# Patient Record
Sex: FEMALE | Race: WHITE | Employment: OTHER | URBAN - METROPOLITAN AREA
[De-identification: names, ages, dates, MRNs, and addresses within clinical notes are randomized per-mention and may not be internally consistent; named-entity substitution may affect disease eponyms.]

---

## 2017-01-04 VITALS
TEMPERATURE: 97.6 F | DIASTOLIC BLOOD PRESSURE: 89 MMHG | SYSTOLIC BLOOD PRESSURE: 160 MMHG | RESPIRATION RATE: 20 BRPM | WEIGHT: 167 LBS | OXYGEN SATURATION: 95 % | BODY MASS INDEX: 40.24 KG/M2 | HEART RATE: 86 BPM

## 2017-01-04 NOTE — PROGRESS NOTES
[facility]    Chief Complaint   Patient presents with     group home Regulatory       HPI:   Obtained from the patient, medical record and from the medial staffs.     Jayne Vasquez is a 86 year old  (9/21/1930), who is being seen today for a federally mandated E/M visit at [facility] .     Today's concerns are:  Nontraumatic hemorrhage of cerebral hemisphere, unspecified laterality (H)  - Late effects from fall resulting in hemorrhage of cerebral hemisphere more than a year ago  - Pt is a WC self propel with feet. ADLs dependant. No new issue.      Benign essential hypertension  - No CP, HA or fainting  - noted to have a high blood pressure when has visitors, laundry day.       Type 2 diabetes mellitus with complication, with long-term current use of insulin (H)  - No hypoglycemic episode reported.  -  Resident diet of choice      Vascular Dementia with Paranoia:  - Pt believes  People area stealing her clothes on laundry day, staff take her to laundry room often while clothes are being washed. Believes men are coming into her room.     ALLERGIES: Dye; Fluoxetine; Iodine-131; Methocarbamol; Paroxetine; and Penicillins  PAST MEDICAL HISTORY:  has a past medical history of Hyperlipemia (4.22.11); Chest pain (11/04/10); GERD (gastroesophageal reflux disease) (11/04/10); Anemia, unspecified (11/04/10); Cerebral embolism with cerebral infarction (H) (02/16/10); Seizure disorder, secondary (H) (02/16/10); Aphasia (02/12/10); Vitamin D deficiency (02/08/10); Confusion (01/30/10); Hyponatremia; Osteoarthrosis, shoulder region (07/19/09); Subacromial bursitis; Rotator cuff tear; Labral tear of long head of biceps tendon; Intermediate coronary syndrome (H); GI bleed; Cataracts; Coronary atherosclerosis of unspecified type of vessel, native or graft (05/20/08); History of recurrent UTIs; Unspecified cerebral artery occlusion with cerebral infarction; Diabetes mellitus (H);  Hypertension; Congestive heart failure, unspecified; History of blood transfusion; Diabetic eye exam (H) (3/26/15); Transient cerebral ischemia (3/30/2012); Diabetic infection of left foot (2/26/2013); Diastolic dysfunction, left ventricle, grade I by Echo (4/2/2012); LVH (left ventricular hypertrophy) due to hypertensive disease - mild-moderate (4/2/2012); and Open wound of left foot in 4th interdigital space (2/26/2013). She also has no past medical history of Thyroid disease, Asthma, or Malignant neoplasm (H).  PAST SURGICAL HISTORY:  has past surgical history that includes appendectomy; Cholecystectomy; Hysterectomy; surgical history of - ; colonoscopy; cataract iol, rt/lt; surgical history of - ; surgical history of -  (5/2008); surgical history of - ; surgical history of - ; colonoscopy (5/31/11); surgical history of -  (5/31/11); cardiac catherization (05/20/08); Esophagoscopy, gastroscopy, duodenoscopy (EGD), combined (4/5/2012); Bypass graft artery coronary (4/9/2012); and Phacoemulsification with standard intraocular lens implant (Right, 4/16/2015).  FAMILY HISTORY: family history includes C.A.D. in her father, mother, and son; CANCER in her mother and sister; CEREBROVASCULAR DISEASE in her sister; Neurologic Disorder in her son.  SOCIAL HISTORY:  reports that she has never smoked. She has never used smokeless tobacco. She reports that she does not drink alcohol or use illicit drugs.    MEDICATIONS:  Current Outpatient Prescriptions   Medication Sig Dispense Refill     BusPIRone HCl (BUSPAR PO) Take 5 mg by mouth 2 times daily       insulin aspart (NOVOLOG PEN) 100 UNIT/ML soln Inject 3 Units Subcutaneous daily (with breakfast) (Patient taking differently: Inject 3 Units Subcutaneous daily (with breakfast) Also follow sliding scale)       metFORMIN (GLUCOPHAGE-XR) 500 MG 24 hr tablet Take 500 mg by mouth daily (with breakfast)       trolamine salicylate (ASPERCREME) 10 % cream Apply topically as needed for  moderate pain       hydrocortisone (ANUSOL-HC) 2.5 % rectal cream Place rectally 2 times daily       hypromellose (ARTIFICIAL TEARS) 0.5 % SOLN 1 drop 3 times daily as needed for dry eyes       nystatin (MYCOSTATIN) 578091 UNIT/GM POWD        Cranberry-Vitamin C-Inulin (UTI-STAT PO) Take by mouth daily       QUEtiapine Fumarate (SEROQUEL PO) Take 25 mg by mouth 2 times daily       bisacodyl (DULCOLAX) 10 MG suppository Place 10 mg rectally daily as needed for constipation       Carvedilol (COREG PO) Take 25 mg by mouth 2 times daily (with meals)       Venlafaxine HCl (EFFEXOR XR PO) Take 225 mg by mouth daily (with breakfast)       Acetaminophen (TYLENOL PO) Take 650 mg by mouth 3 times daily as needed for mild pain or fever (also BID scheduled)        ASPIRIN PO Take 81 mg by mouth daily       LISINOPRIL PO Take 40 mg by mouth daily        Gabapentin (NEURONTIN PO) Take 600 mg by mouth 2 times daily       fluticasone (FLONASE) 50 MCG/ACT nasal spray Spray 1 spray into both nostrils At Bedtime       insulin glargine (LANTUS) 100 UNIT/ML PEN Inject 14 Units Subcutaneous every morning        sennosides (SENOKOT) 8.6 MG tablet Take 2 tablets by mouth 2 times daily Also 1 tab BID PRN       polyethylene glycol (MIRALAX/GLYCOLAX) powder Take 17 g by mouth daily        alum & mag hydroxide-simethicone (MAALOX ADVANCED MAX ST) 400-400-40 MG/5ML SUSP Take 30 mLs by mouth every 4 hours as needed PRN for for stomach distress  0     nitroglycerin (NITROSTAT) 0.4 MG SL tablet Place 1 tablet (0.4 mg) under the tongue every 5 minutes as needed 25 tablet      levETIRAcetam (KEPPRA) 100 MG/ML solution Take 7.5 mLs (750 mg) by mouth every 12 hours       traZODone (DESYREL) 50 MG tablet Take 1 tablet (50 mg) by mouth At Bedtime 31 tablet 12     amLODIPine (NORVASC) 10 MG tablet Take 1 tablet (10 mg) by mouth daily 90 tablet 3     omeprazole 20 MG tablet Take 1 tablet (20 mg) by mouth daily 30 tablet      Medications  reviewed:  Medications reconciled to facility chart and changes were made to reflect current medications as identified as above med list. Below are the changes that were made:   Medications stopped since last EPIC medication reconciliation:   There are no discontinued medications.    Medications started since last [facility] medication reconciliation:  No orders of the defined types were placed in this encounter.     Case Management:  I have reviewed the care plan and MDS and do agree with the plan. Patient's desire to return to the community is not present.  Information reviewed:  Medications, vital signs, orders, and nursing notes.    ROS:  10 point ROS of systems including Constitutional, Eyes, Respiratory, Cardiovascular, Gastroenterology, Genitourinary, Integumentary, Muscularskeletal, Psychiatric were all negative except for pertinent positives noted in my HPI.    Exam:  /89 mmHg  Pulse 86  Temp(Src) 97.6  F (36.4  C)  Resp 20  Wt 167 lb (75.751 kg)  SpO2 95%    GENERAL APPEARANCE:  Alert, in no distress, well dressed up.  ENT:  Mouth and posterior oropharynx normal, moist mucous membranes  EYES:  EOM, conjunctivae, lids, pupils and irises normal, Left ptosis. mild  NECK:  No adenopathy,masses or thyromegaly  RESP:  respiratory effort and palpation of chest normal, lungs clear to auscultation   CV:  Palpation and auscultation of heart done , regular rate and rhythm, no murmur, rub, or gallop, no edema  ABDOMEN:  normal bowel sounds, soft, nontender, no hepatosplenomegaly or other masses  M/S:   Ambulate using the wheelchair. Self propel  SKIN:  Inspection of skin and subcutaneous tissue baseline, Palpation of skin and subcutaneous tissue baseline  NEURO:   No neuro focal deficit noted  PSYCH:  oriented to Name only. Memory and judgment impaired.     Lab/Diagnostic data:    Last Basic Metabolic Panel:  NA      141   8/1/2016   POTASSIUM      4.1   8/1/2016  CHLORIDE      104   8/1/2016  ELIZABETH      8.9    8/1/2016  CO2       32   8/1/2016  BUN       26   9/2/2016  CR     1.10   9/2/2016  GLC      145   9/2/2016  GLC      neg   11/23/2012    ASSESSMENT/PLAN  Nontraumatic hemorrhage of cerebral hemisphere, unspecified laterality (H)  - Late effects from fall resulting in hemorrhage of cerebral hemisphere more than a year ago  - Pt is a WC self propel with feet. ADLs dependant.    - Stable     Benign essential hypertension  - BP: 196/79, 164/66, 177/76  BP Readings from Last 3 Encounters:   01/04/17 160/89   12/22/16 158/62   11/21/16 149/65   - On Norvasc 10 mg, Coreg 25 mg bid, lisinopril 40 mg.   - Keep SBP> 130 mmHg and DBP > 65 mmHg (levels below these increase mortality as shown by standard studies and observations).         Type 2 diabetes mellitus with complication, with long-term current use of insulin (H)  - A1C      7.3   1/6/2017  - A1C      7.3   9/2/2016  - On metformin 500 am qam (added Nov 2016)  - On Lantus 18 qam, novolog 3 mg with meals , and SSI  - Dc metformin, Increase novolog am from 3 mg to 5 mg, Check HbA1C, DC supper Accu check.       Vascular dementia with behavior disturbance and Paranoia/ depression and Anxiety:  - BIMS 6/27/16 = 0/15  - Continue to anticipate needs. Chronic condition, ongoing decline expected.   -  Continue to provide redirection and reassurance as needed. Maintain safe living situation with goals focused on comfort.  - On Seroquel 25 mg b.i.d., Effexor 225 mg, Trazodone 50 QHS, Buspar 5 mg bid (added 1/22/16)  - Failed Seroquel GDR attempt.      Frailty:  - Significant  Deficits requiring NH placement  - Requiring extensive assistance from nursing  - Up for meals only o/w spends the day resting in bed    Orders:  -  Dc metformin  - Increase novolog am from 3 mg to 5 mg.   - Check HbA1C   - DC supper Accu check.     Total time spent with patient visit was 35 min including patient visit and review of past records..    Electronically signed by:  Jesse Gonzalez MD

## 2017-01-05 ENCOUNTER — NURSING HOME VISIT (OUTPATIENT)
Dept: GERIATRICS | Facility: CLINIC | Age: 82
End: 2017-01-05
Payer: COMMERCIAL

## 2017-01-05 DIAGNOSIS — R54 FRAIL ELDERLY: ICD-10-CM

## 2017-01-05 DIAGNOSIS — I10 BENIGN ESSENTIAL HYPERTENSION: ICD-10-CM

## 2017-01-05 DIAGNOSIS — F41.9 ANXIETY: ICD-10-CM

## 2017-01-05 DIAGNOSIS — Z79.4 TYPE 2 DIABETES MELLITUS WITH COMPLICATION, WITH LONG-TERM CURRENT USE OF INSULIN (H): ICD-10-CM

## 2017-01-05 DIAGNOSIS — E11.8 TYPE 2 DIABETES MELLITUS WITH COMPLICATION, WITH LONG-TERM CURRENT USE OF INSULIN (H): ICD-10-CM

## 2017-01-05 DIAGNOSIS — I61.2 NONTRAUMATIC HEMORRHAGE OF CEREBRAL HEMISPHERE, UNSPECIFIED LATERALITY (H): Primary | ICD-10-CM

## 2017-01-05 DIAGNOSIS — F01.518 VASCULAR DEMENTIA WITH BEHAVIOR DISTURBANCE (H): ICD-10-CM

## 2017-01-05 PROCEDURE — 99310 SBSQ NF CARE HIGH MDM 45: CPT | Performed by: FAMILY MEDICINE

## 2017-01-05 PROCEDURE — 99207 ZZC CDG-CORRECTLY CODED, REVIEWED AND AGREE: CPT | Performed by: FAMILY MEDICINE

## 2017-01-06 ENCOUNTER — HOSPITAL LABORATORY (OUTPATIENT)
Dept: NURSING HOME | Facility: OTHER | Age: 82
End: 2017-01-06

## 2017-01-06 LAB — HBA1C MFR BLD: 7.3 % (ref 4.3–6)

## 2017-01-09 PROBLEM — R54 FRAIL ELDERLY: Status: ACTIVE | Noted: 2017-01-09

## 2017-01-25 ENCOUNTER — HOSPITAL LABORATORY (OUTPATIENT)
Dept: NURSING HOME | Facility: OTHER | Age: 82
End: 2017-01-25

## 2017-01-25 LAB — HBA1C MFR BLD: NORMAL % (ref 4.3–6)

## 2017-03-02 ENCOUNTER — NURSING HOME VISIT (OUTPATIENT)
Dept: GERIATRICS | Facility: CLINIC | Age: 82
End: 2017-03-02
Payer: COMMERCIAL

## 2017-03-02 VITALS
HEART RATE: 72 BPM | TEMPERATURE: 97.2 F | WEIGHT: 182 LBS | SYSTOLIC BLOOD PRESSURE: 139 MMHG | BODY MASS INDEX: 43.88 KG/M2 | DIASTOLIC BLOOD PRESSURE: 72 MMHG | RESPIRATION RATE: 18 BRPM | OXYGEN SATURATION: 98 %

## 2017-03-02 DIAGNOSIS — G99.0 PERIPHERAL AUTONOMIC NEUROPATHY IN DISORDERS CLASSIFIED ELSEWHERE: ICD-10-CM

## 2017-03-02 DIAGNOSIS — F41.9 ANXIETY: ICD-10-CM

## 2017-03-02 DIAGNOSIS — E11.8 TYPE 2 DIABETES MELLITUS WITH COMPLICATION, WITH LONG-TERM CURRENT USE OF INSULIN (H): Primary | ICD-10-CM

## 2017-03-02 DIAGNOSIS — F22 PARANOIA (H): ICD-10-CM

## 2017-03-02 DIAGNOSIS — I51.9 DIASTOLIC DYSFUNCTION, LEFT VENTRICLE: ICD-10-CM

## 2017-03-02 DIAGNOSIS — I25.10 CORONARY ARTERY DISEASE INVOLVING NATIVE CORONARY ARTERY OF NATIVE HEART WITHOUT ANGINA PECTORIS: ICD-10-CM

## 2017-03-02 DIAGNOSIS — I13.0 HYPERTENSIVE HEART AND KIDNEY DISEASE WITH HF AND WITH CKD STAGE I-IV (H): ICD-10-CM

## 2017-03-02 DIAGNOSIS — K21.9 GASTROESOPHAGEAL REFLUX DISEASE, ESOPHAGITIS PRESENCE NOT SPECIFIED: ICD-10-CM

## 2017-03-02 DIAGNOSIS — I35.0 AORTIC STENOSIS, MILD: ICD-10-CM

## 2017-03-02 DIAGNOSIS — N18.30 CKD (CHRONIC KIDNEY DISEASE) STAGE 3, GFR 30-59 ML/MIN (H): ICD-10-CM

## 2017-03-02 DIAGNOSIS — I34.0 MITRAL VALVE INSUFFICIENCY, UNSPECIFIED ETIOLOGY: ICD-10-CM

## 2017-03-02 DIAGNOSIS — R44.1 VISUAL HALLUCINATIONS: ICD-10-CM

## 2017-03-02 DIAGNOSIS — F01.518 VASCULAR DEMENTIA WITH BEHAVIOR DISTURBANCE (H): ICD-10-CM

## 2017-03-02 DIAGNOSIS — F33.1 MAJOR DEPRESSIVE DISORDER, RECURRENT EPISODE, MODERATE (H): ICD-10-CM

## 2017-03-02 DIAGNOSIS — Z79.4 TYPE 2 DIABETES MELLITUS WITH COMPLICATION, WITH LONG-TERM CURRENT USE OF INSULIN (H): Primary | ICD-10-CM

## 2017-03-02 PROCEDURE — 99309 SBSQ NF CARE MODERATE MDM 30: CPT | Performed by: NURSE PRACTITIONER

## 2017-03-02 NOTE — PROGRESS NOTES
[facility]    Chief Complaint   Patient presents with     retirement Regulatory       HPI:    Jayne Vasquez is a 86 year old  (9/21/1930), who is being seen today for a federally mandated E/M visit at [facility] . Today's concerns are:  Type 2 diabetes mellitus with complication, with long-term current use of insulin (H)  On Lantus 18 units qAM, Novolog 5 units with breakfast, SSI BID, TID accuchecks  SSI starts at .  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)/Diastolic dysfunction, left ventricle, grade I by Echo/Coronary artery disease involving native coronary artery of native heart without angina pectoris/Aortic stenosis, mild - per Echo/Mitral valve insufficiency, unspecified etiology  On lisinopril 40 mg daily, amlodipine 10 mg daily, ASA 81 mg daily, carvedilol 25 mg BID, nitro PRN (no usage)  BPs 140s/50-70s mostly  HR 70-90s    CKD stage 3, due to effects of DM II and HTN GFR 9/2012 51; 4/2012 59; 12/11 53; 10/2011 59  Cardio-renal  Last Basic Metabolic Panel:  Lab Results   Component Value Date     08/01/2016      Lab Results   Component Value Date    POTASSIUM 4.1 08/01/2016     Lab Results   Component Value Date    CHLORIDE 104 08/01/2016     Lab Results   Component Value Date    ELIZABETH 8.9 08/01/2016     Lab Results   Component Value Date    CO2 32 08/01/2016     Lab Results   Component Value Date    BUN 26 09/02/2016     Lab Results   Component Value Date    CR 1.10 09/02/2016     Lab Results   Component Value Date     09/02/2016    GLC neg 11/23/2012       Vascular dementia with behavior disturbance/Major depressive disorder, recurrent episode, moderate (H)/Anxiety/Paranoia (H)/Visual hallucinations  Patient has paranoia, especially on the days when her laundry is being done as she believes people are stealing it.    Nursing also noting that patient reports men are coming into her room.   On Seroquel 25 mg BID, Effexor  mg daily, Trazodone 50 mg HS, Buspar 5 mg BID.  Also on Lamictal 750 mg BID (for history of seizures).    Advanced, progressive decline.  Patient unable to make needs known; nursing must anticipate needs.     Gastroesophageal reflux disease, esophagitis presence not specified  On omeprazole 20 mg daily.  Patient not reliable historian on symptoms.     Peripheral autonomic neuropathy in disorders classified elsewhere  On gabapentin 600 mg BID      ALLERGIES: Dye [contrast dye]; Fluoxetine; Iodine-131; Methocarbamol; Paroxetine; and Penicillins  PAST MEDICAL HISTORY:  has a past medical history of Anemia, unspecified (11/04/10); Aphasia (02/12/10); Cataracts; Cerebral embolism with cerebral infarction (H) (02/16/10); Chest pain (11/04/10); Confusion (01/30/10); Congestive heart failure, unspecified; Coronary atherosclerosis of unspecified type of vessel, native or graft (05/20/08); Diabetes mellitus (H); Diabetic eye exam (H) (3/26/15); Diabetic infection of left foot (2/26/2013); Diastolic dysfunction, left ventricle, grade I by Echo (4/2/2012); GERD (gastroesophageal reflux disease) (11/04/10); GI bleed; History of blood transfusion; History of recurrent UTIs; Hyperlipemia (4.22.11); Hypertension; Hyponatremia; Intermediate coronary syndrome (H); Labral tear of long head of biceps tendon; LVH (left ventricular hypertrophy) due to hypertensive disease - mild-moderate (4/2/2012); Open wound of left foot in 4th interdigital space (2/26/2013); Osteoarthrosis, shoulder region (07/19/09); Rotator cuff tear; Seizure disorder, secondary (H) (02/16/10); Subacromial bursitis; Transient cerebral ischemia (3/30/2012); Unspecified cerebral artery occlusion with cerebral infarction; and Vitamin D deficiency (02/08/10). She also has no past medical history of Asthma; Malignant neoplasm (H); or Thyroid disease.  PAST  SURGICAL HISTORY:  has a past surgical history that includes appendectomy; Cholecystectomy; Hysterectomy; surgical history of -; colonoscopy; cataract iol, rt/lt; surgical history of -; surgical history of - (5/2008); surgical history of -; surgical history of -; colonoscopy (5/31/11); surgical history of - (5/31/11); cardiac catherization (05/20/08); Esophagoscopy, gastroscopy, duodenoscopy (EGD), combined (4/5/2012); Bypass graft artery coronary (4/9/2012); and Phacoemulsification with standard intraocular lens implant (Right, 4/16/2015).  FAMILY HISTORY: family history includes C.A.D. in her father, mother, and son; CANCER in her mother and sister; CEREBROVASCULAR DISEASE in her sister; Neurologic Disorder in her son.  SOCIAL HISTORY:  reports that she has never smoked. She has never used smokeless tobacco. She reports that she does not drink alcohol or use illicit drugs.    MEDICATIONS:  Current Outpatient Prescriptions   Medication Sig Dispense Refill     DOCUSATE SODIUM PO Take 100 mg by mouth 2 times daily       BusPIRone HCl (BUSPAR PO) Take 5 mg by mouth 2 times daily       insulin aspart (NOVOLOG PEN) 100 UNIT/ML soln Inject 3 Units Subcutaneous daily (with breakfast) (Patient taking differently: Inject 8 Units Subcutaneous daily (with breakfast) )       trolamine salicylate (ASPERCREME) 10 % cream Apply topically as needed for moderate pain       hydrocortisone (ANUSOL-HC) 2.5 % rectal cream Place rectally 2 times daily       hypromellose (ARTIFICIAL TEARS) 0.5 % SOLN 1 drop 3 times daily as needed for dry eyes       nystatin (MYCOSTATIN) 334673 UNIT/GM POWD        Cranberry-Vitamin C-Inulin (UTI-STAT PO) Take by mouth daily       QUEtiapine Fumarate (SEROQUEL PO) Take 25 mg by mouth 2 times daily       bisacodyl (DULCOLAX) 10 MG suppository Place 10 mg rectally daily as needed for constipation       Carvedilol (COREG PO) Take 25 mg by mouth 2 times daily (with meals)       Venlafaxine HCl (EFFEXOR XR PO)  Take 225 mg by mouth daily (with breakfast)       Acetaminophen (TYLENOL PO) Take 650 mg by mouth 2 times daily Also TID PRN       ASPIRIN PO Take 81 mg by mouth daily       LISINOPRIL PO Take 40 mg by mouth daily        Gabapentin (NEURONTIN PO) Take 600 mg by mouth 2 times daily       fluticasone (FLONASE) 50 MCG/ACT nasal spray Spray 1 spray into both nostrils At Bedtime       insulin glargine (LANTUS) 100 UNIT/ML PEN Inject 18 Units Subcutaneous every morning        sennosides (SENOKOT) 8.6 MG tablet Take 2 tablets by mouth 2 times daily        polyethylene glycol (MIRALAX/GLYCOLAX) powder Take 17 g by mouth daily        alum & mag hydroxide-simethicone (MAALOX ADVANCED MAX ST) 400-400-40 MG/5ML SUSP Take 30 mLs by mouth every 4 hours as needed PRN for for stomach distress  0     nitroglycerin (NITROSTAT) 0.4 MG SL tablet Place 1 tablet (0.4 mg) under the tongue every 5 minutes as needed 25 tablet      levETIRAcetam (KEPPRA) 100 MG/ML solution Take 7.5 mLs (750 mg) by mouth every 12 hours       traZODone (DESYREL) 50 MG tablet Take 1 tablet (50 mg) by mouth At Bedtime 31 tablet 12     amLODIPine (NORVASC) 10 MG tablet Take 1 tablet (10 mg) by mouth daily 90 tablet 3     Medications reviewed:  Medications reconciled to facility chart and changes were made to reflect current medications as identified as above med list. Below are the changes that were made:   Medications stopped since last EPIC medication reconciliation:   Medications Discontinued During This Encounter   Medication Reason     omeprazole 20 MG tablet Medication Reconciliation Clean Up     metFORMIN (GLUCOPHAGE-XR) 500 MG 24 hr tablet Medication Reconciliation Clean Up       Medications started since last [facility] medication reconciliation:  Orders Placed This Encounter   Medications     DOCUSATE SODIUM PO     Sig: Take 100 mg by mouth 2 times daily     Case Management:  I have reviewed the care plan and MDS and do agree with the plan. Patient's desire  to return to the community is not assessible due to cognitive impairment.  Information reviewed:  Medications, vital signs, orders, and nursing notes.    ROS:  Unobtainable secondary to cognitive impairment or aphasia.    Exam:  /72  Pulse 72  Temp 97.2  F (36.2  C)  Resp 18  Wt 182 lb (82.6 kg)  SpO2 98%  BMI 43.88 kg/m2  GENERAL APPEARANCE:  Alert, in no overt distress, easily agitated  RESP:  respiratory effort and palpation of chest normal, auscultation of lungs clear, no respiratory distress  CV:  Palpation and auscultation of heart done , rate and rhythm irregular, systolic murmur, no LE peripheral edema  ABDOMEN: obese, normal bowel sounds, soft, nontender, no hepatosplenomegaly or other masses  M/S:   Gait and station with WC mobility, Digits and nails at baseline,   SKIN:  Inspection and Palpation of skin and subcutaneous tissue intact, no rashes present, dry, fragile, pale  PSYCH:  insight and judgement, memory with severe impairment, affect and mood normal, does not follow commands readily       Lab/Diagnostic data:    Results for orders placed or performed in visit on 01/25/17   Hemoglobin A1c   Result Value Ref Range    Hemoglobin A1C  4.3 - 6.0 %     Canceled, Test credited   Duplicate request  CORRECTED ON 01/25 AT 0913: PREVIOUSLY REPORTED AS 7.4       Last Basic Metabolic Panel:  Lab Results   Component Value Date     08/01/2016      Lab Results   Component Value Date    POTASSIUM 4.1 08/01/2016     Lab Results   Component Value Date    CHLORIDE 104 08/01/2016     Lab Results   Component Value Date    ELIZABETH 8.9 08/01/2016     Lab Results   Component Value Date    CO2 32 08/01/2016     Lab Results   Component Value Date    BUN 26 09/02/2016     Lab Results   Component Value Date    CR 1.10 09/02/2016     Lab Results   Component Value Date     09/02/2016    GLC neg 11/23/2012         ASSESSMENT/PLAN  Type 2 diabetes mellitus with complication, with long-term current use of insulin  (H)  Will increase breakfast Novolog to 8 units and DC SSI and accuchecks.  Discussed POC with patient's son/POA and he agreed.  Will check A1C every 13 weeks to monitor for progress. Last A1C 7.3 - goal around 8%.     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  Patient also has history of a fib with anticoagulation of ASA 81 mg only d/t CVA bleed.   BPs stable with goal >130 and <150.  LS clear, no LE edema  Weights up but likely nutritional weight gain as no clinical signs of exacerbation - no diuretics in use.     CKD stage 3, due to effects of DM II and HTN GFR 9/2012 51; 4/2012 59; 12/11 53; 10/2011 59  Cardio-renal.   Will redraw BMP for monitoring.   Avoid nephrotoxic agents and mindful prescribing with renal dosing.       Vascular dementia with behavior disturbance/Major depressive disorder, recurrent episode, moderate (H)/Anxiety/Paranoia (H)/Visual hallucinations  Will monitor.  Buspar started 12/22/16.    Has failed GDR of seroquel  Patient continues to have above-mentioned behaviors but are less frequent and nursing does effective job of reassuring patient and redirecting patient when able.     Gastroesophageal reflux disease, esophagitis presence not specified  Discussion with patient's son/POA about taper of PPI to assess for need.  Patient's son agrees to POC.   PPIs increase risk of pneumonia, C.Diff., fractures, hypomagnesemia.  Risk outweighs benefit of continued therapy.      Peripheral autonomic neuropathy in disorders classified elsewhere  Continue on gabapentin.       Orders:  1. Increase Breakfast Novolog to 8 units sq.  Dx: DM2  2.  D/c SSI Novolog.  3.  D/c Accu checks.  4.  D/c Phillips.  No use.  5.   BMP Monday 3/6/17.  Dx: CKD 3  6.  Decrease Omeprazole to 10 mg po QD x 7 days, then 10 mg po QOD x 10 days, then d/c.  Dx: GERD    Total time spent with patient visit was 35 min including patient visit, phone call to POA, and review of past records.Greater than 50% of total time spent with counseling and coordinating care.    Electronically signed by:  LORI Ricks CNP

## 2017-03-03 ENCOUNTER — HOSPITAL LABORATORY (OUTPATIENT)
Dept: NURSING HOME | Facility: OTHER | Age: 82
End: 2017-03-03

## 2017-03-03 LAB
BUN SERPL-MCNC: 21 MG/DL (ref 7–30)
CREAT SERPL-MCNC: 1.08 MG/DL (ref 0.52–1.04)
ERYTHROCYTE [DISTWIDTH] IN BLOOD BY AUTOMATED COUNT: 12.4 % (ref 10–15)
GFR SERPL CREATININE-BSD FRML MDRD: 48 ML/MIN/1.7M2
HCT VFR BLD AUTO: 36.5 % (ref 35–47)
HGB BLD-MCNC: 12.2 G/DL (ref 11.7–15.7)
MCH RBC QN AUTO: 30.2 PG (ref 26.5–33)
MCHC RBC AUTO-ENTMCNC: 33.4 G/DL (ref 31.5–36.5)
MCV RBC AUTO: 90 FL (ref 78–100)
PLATELET # BLD AUTO: 147 10E9/L (ref 150–450)
RBC # BLD AUTO: 4.04 10E12/L (ref 3.8–5.2)
WBC # BLD AUTO: 4.8 10E9/L (ref 4–11)

## 2017-03-06 ENCOUNTER — HOSPITAL LABORATORY (OUTPATIENT)
Dept: NURSING HOME | Facility: OTHER | Age: 82
End: 2017-03-06

## 2017-03-06 LAB
ANION GAP SERPL CALCULATED.3IONS-SCNC: 10 MMOL/L (ref 3–14)
BUN SERPL-MCNC: 24 MG/DL (ref 7–30)
CALCIUM SERPL-MCNC: 8.7 MG/DL (ref 8.5–10.1)
CHLORIDE SERPL-SCNC: 104 MMOL/L (ref 94–109)
CO2 SERPL-SCNC: 30 MMOL/L (ref 20–32)
CREAT SERPL-MCNC: 1.12 MG/DL (ref 0.52–1.04)
GFR SERPL CREATININE-BSD FRML MDRD: 46 ML/MIN/1.7M2
GLUCOSE SERPL-MCNC: 152 MG/DL (ref 70–99)
POTASSIUM SERPL-SCNC: 3.9 MMOL/L (ref 3.4–5.3)
SODIUM SERPL-SCNC: 144 MMOL/L (ref 133–144)

## 2017-04-14 ENCOUNTER — NURSING HOME VISIT (OUTPATIENT)
Dept: GERIATRICS | Facility: CLINIC | Age: 82
End: 2017-04-14
Payer: COMMERCIAL

## 2017-04-14 VITALS
WEIGHT: 181.8 LBS | OXYGEN SATURATION: 90 % | SYSTOLIC BLOOD PRESSURE: 160 MMHG | DIASTOLIC BLOOD PRESSURE: 82 MMHG | RESPIRATION RATE: 18 BRPM | BODY MASS INDEX: 43.83 KG/M2 | TEMPERATURE: 98.1 F | HEART RATE: 93 BPM

## 2017-04-14 DIAGNOSIS — J44.9 CHRONIC OBSTRUCTIVE PULMONARY DISEASE, UNSPECIFIED COPD TYPE (H): ICD-10-CM

## 2017-04-14 DIAGNOSIS — Z79.4 TYPE 2 DIABETES MELLITUS WITH COMPLICATION, WITH LONG-TERM CURRENT USE OF INSULIN (H): ICD-10-CM

## 2017-04-14 DIAGNOSIS — F41.9 ANXIETY: ICD-10-CM

## 2017-04-14 DIAGNOSIS — N18.30 CKD (CHRONIC KIDNEY DISEASE) STAGE 3, GFR 30-59 ML/MIN (H): ICD-10-CM

## 2017-04-14 DIAGNOSIS — I10 HTN, GOAL BELOW 140/90: ICD-10-CM

## 2017-04-14 DIAGNOSIS — R56.9 CONVULSIONS, UNSPECIFIED CONVULSION TYPE (H): ICD-10-CM

## 2017-04-14 DIAGNOSIS — K21.9 GASTROESOPHAGEAL REFLUX DISEASE, ESOPHAGITIS PRESENCE NOT SPECIFIED: ICD-10-CM

## 2017-04-14 DIAGNOSIS — E11.8 TYPE 2 DIABETES MELLITUS WITH COMPLICATION, WITH LONG-TERM CURRENT USE OF INSULIN (H): ICD-10-CM

## 2017-04-14 DIAGNOSIS — I35.0 AORTIC STENOSIS, MILD: ICD-10-CM

## 2017-04-14 DIAGNOSIS — I25.10 CORONARY ARTERY DISEASE INVOLVING NATIVE CORONARY ARTERY OF NATIVE HEART WITHOUT ANGINA PECTORIS: ICD-10-CM

## 2017-04-14 DIAGNOSIS — G89.29 CHRONIC MIDLINE LOW BACK PAIN WITH SCIATICA, SCIATICA LATERALITY UNSPECIFIED: ICD-10-CM

## 2017-04-14 DIAGNOSIS — I61.2 NONTRAUMATIC HEMORRHAGE OF CEREBRAL HEMISPHERE, UNSPECIFIED LATERALITY (H): Primary | ICD-10-CM

## 2017-04-14 DIAGNOSIS — M54.2 NECK PAIN ON RIGHT SIDE: ICD-10-CM

## 2017-04-14 DIAGNOSIS — F01.518 VASCULAR DEMENTIA WITH BEHAVIOR DISTURBANCE (H): ICD-10-CM

## 2017-04-14 DIAGNOSIS — F32.A DEPRESSION, UNSPECIFIED DEPRESSION TYPE: ICD-10-CM

## 2017-04-14 DIAGNOSIS — M54.40 CHRONIC MIDLINE LOW BACK PAIN WITH SCIATICA, SCIATICA LATERALITY UNSPECIFIED: ICD-10-CM

## 2017-04-14 DIAGNOSIS — F22 PARANOIA (H): ICD-10-CM

## 2017-04-14 DIAGNOSIS — I34.0 MITRAL VALVE INSUFFICIENCY, UNSPECIFIED ETIOLOGY: ICD-10-CM

## 2017-04-14 DIAGNOSIS — I25.2 OLD MYOCARDIAL INFARCTION: ICD-10-CM

## 2017-04-14 DIAGNOSIS — I51.9 DIASTOLIC DYSFUNCTION, LEFT VENTRICLE: ICD-10-CM

## 2017-04-14 DIAGNOSIS — I11.0 LVH (LEFT VENTRICULAR HYPERTROPHY) DUE TO HYPERTENSIVE DISEASE, WITH HEART FAILURE (H): ICD-10-CM

## 2017-04-14 PROCEDURE — 99318 ZZC ANNUAL NURSING FAC ASSESSMNT, STABLE: CPT | Performed by: NURSE PRACTITIONER

## 2017-04-14 RX ORDER — HALOPERIDOL 5 MG/ML
2 INJECTION INTRAMUSCULAR EVERY 6 HOURS PRN
COMMUNITY
End: 2017-05-22

## 2017-04-14 NOTE — PROGRESS NOTES
"[place]  Chief Complaint   Patient presents with     [facility] Regulatory       HPI:    Jayne Vasquez is a 86 year old  (9/21/1930), who is being seen today for an annual comprehensive visit at [facility] . Today's concerns are:  Hx of Nontraumatic hemorrhage of cerebral hemisphere, unspecified laterality (H)/Convulsions, unspecified convulsion type (H)  2010 after a fall at home  Post CVA seizure - put on levetriacetam 750 mg BID - no seizures since  On ASA 81 mg daily for History of TIAs    Paranoia (H)/Vascular dementia with behavior disturbance/Depression, unspecified depression type/Anxiety  Recent psychosis where family very upset at patient's paranoia, hallucinations and severe anxiety.  Buspirone changed at that time from BID to TID (now 5 mg TID).  Patient on Seroquel 25 mg BID (failed GDR in past).  Seroquel 12.5 mg daily PRN (used once) added at that time as well.    Also on Tazodone 50 mg qHS, Effexor  mg daily  Has Haldol 1 mg IM daily PRN for severe psychosis (no usage)    3/23/17 BIMS noting \"moderately impaired\" - prev BIMS noting severe impairment      Type 2 diabetes mellitus with complication, with long-term current use of insulin (H)  Metformin DC'd 2/2017  On Lantus 18 units qAM, Novolog 8 units with breakfast  No accuchecks  Lab Results   Component Value Date    A1C  01/25/2017     Canceled, Test credited   Duplicate request  CORRECTED ON 01/25 AT 0913: PREVIOUSLY REPORTED AS 7.4      A1C 7.3 01/06/2017    A1C  10/26/2016     Canceled, Test credited  REFUSED TO SIGN ABN  CORRECTED ON 10/26 AT 1150: PREVIOUSLY REPORTED AS 7.4      A1C 7.3 09/02/2016    A1C 7.1 07/27/2016     Recheck A1C scheduled for 4/26/17    Also on gabapentin 600 mg BID  On ASA 81 mg, no statin    HTN, goal below 140/90/Old myocardial infarction/LVH (left ventricular hypertrophy) due to hypertensive disease, with heart failure (H)/Diastolic dysfunction, left " ventricle, grade I by Echo/Coronary artery disease involving native coronary artery of native heart without angina pectoris/Mitral valve insufficiency, unspecified etiology/Aortic stenosis, mild - per Echo  Significant cardiac history   BPs 130-160s/70-90s  HRs 80-90  On nitro PRN (no usage), norvasc 10 mg daily, ASA 81 mg daily, Coreg 25 mg BID, lisinopril 40 mg daily  Patient unable to note today if she has CP    Chronic obstructive pulmonary disease, unspecified COPD type (H)  On no meds  Patient unable to describe if SOB - nursing notes she never c/o SOB    CKD stage 3, due to effects of DM II and HTN GFR 9/2012 51; 4/2012 59; 12/11 53; 10/2011 59  3/6/17 labs: BUN 24, Creat 1.12, GFR 46  Cardio-renal    Gastroesophageal reflux disease, esophagitis presence not specified  On Alum-Mag-Simethicone q4 hours PRN (no usage)   History GI bleed  Patient unable to describe if symptoms of heartburn or upset stomach    Chronic midline low back pain with sciatica, sciatica laterality unspecified/Neck pain on right side  On Tylenol 650 mg BID  Patient unable to describe today if having pain.     ALLERGIES: Dye [contrast dye]; Fluoxetine; Iodine-131; Methocarbamol; Paroxetine; and Penicillins  PROBLEM LIST:  Patient Active Problem List   Diagnosis     Pulmonary HTN (H)     Hyperlipidemia LDL goal <100     GERD (gastroesophageal reflux disease)     COPD (chronic obstructive pulmonary disease) (H)     Old myocardial infarction     CKD stage 3, due to effects of DM II and HTN GFR 9/2012 51; 4/2012 59; 12/11 53; 10/2011 59     Other and unspecified angina pectoris due to effects of htn heart disease     Transient ischemic attack (TIA), and cerebral infarction without residual deficits     Convulsions (H)     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)     Peripheral autonomic neuropathy in disorders classified elsewhere     Blindness, legal     Anxiety     Thrombocytopenia (H)     Anemia of other chronic  disease due to CKD     Constipation     Headache     Diarrhea     CAD s/p CABG 2012; angio 2008 - occluded RCA and RCA stent, distal circ occlusion with open prox-mid circ stent, 30% D2 stenosis     Visual hallucinations     MR (mitral regurgitation) - mild on Echo     Aortic stenosis, mild - per Echo     LVH (left ventricular hypertrophy) due to hypertensive disease - mild-moderate     Diastolic dysfunction, left ventricle, grade I by Echo     Atrial fibrillation (H)     Postsurgical aortocoronary bypass status     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)     Overactive bladder     Neuropathy of lower extremity     Yeast infection of the skin     Moderate major depression (H)     Cystitis, chronic     Cellulitis     Atherosclerotic peripheral vascular disease with intermittent claudication (H)     Type 2 diabetes mellitus with manifestations - retinopathy, neuropathy, nephropathy     Seizure disorder (H)     Diabetic foot infection (H)     Leukopenia     Advanced directives, counseling/discussion     Type 2 diabetes, HbA1C goal < 8% (H)     Orthostatic hypotension     Nonhealing nonsurgical wound     Bone infection (H)     Vascular dementia     Fracture of phalanx of finger     Fall from bed     Neck pain on right side     Intracerebral hemorrhage (H)     HTN, goal below 140/90     Urinary frequency     Cerebral infarction (H)     Health Care Home     Anemia     Back pain     Confusion     Paranoia (H)     Depression     Dementia     Psychosis     Nontraumatic hemorrhage of cerebral hemisphere (H)     Frail elderly     PAST MEDICAL HISTORY:  has a past medical history of Anemia, unspecified (11/04/10); Aphasia (02/12/10); Cataracts; Cerebral embolism with cerebral infarction (H) (02/16/10); Chest pain (11/04/10); Confusion (01/30/10); Congestive heart failure, unspecified; Coronary atherosclerosis of unspecified type of vessel, native or graft (05/20/08); Diabetes mellitus (H);  Diabetic eye exam (H) (3/26/15); Diabetic infection of left foot (2/26/2013); Diastolic dysfunction, left ventricle, grade I by Echo (4/2/2012); GERD (gastroesophageal reflux disease) (11/04/10); GI bleed; History of blood transfusion; History of recurrent UTIs; Hyperlipemia (4.22.11); Hypertension; Hyponatremia; Intermediate coronary syndrome (H); Labral tear of long head of biceps tendon; LVH (left ventricular hypertrophy) due to hypertensive disease - mild-moderate (4/2/2012); Open wound of left foot in 4th interdigital space (2/26/2013); Osteoarthrosis, shoulder region (07/19/09); Rotator cuff tear; Seizure disorder, secondary (H) (02/16/10); Subacromial bursitis; Transient cerebral ischemia (3/30/2012); Unspecified cerebral artery occlusion with cerebral infarction; and Vitamin D deficiency (02/08/10). She also has no past medical history of Asthma; Malignant neoplasm (H); or Thyroid disease.  PAST SURGICAL HISTORY:  has a past surgical history that includes appendectomy; Cholecystectomy; Hysterectomy; surgical history of -; colonoscopy; cataract iol, rt/lt; surgical history of -; surgical history of - (5/2008); surgical history of -; surgical history of -; colonoscopy (5/31/11); surgical history of - (5/31/11); cardiac catherization (05/20/08); Esophagoscopy, gastroscopy, duodenoscopy (EGD), combined (4/5/2012); Bypass graft artery coronary (4/9/2012); and Phacoemulsification with standard intraocular lens implant (Right, 4/16/2015).  FAMILY HISTORY: family history includes C.A.D. in her father, mother, and son; CANCER in her mother and sister; CEREBROVASCULAR DISEASE in her sister; Neurologic Disorder in her son.  SOCIAL HISTORY:  reports that she has never smoked. She has never used smokeless tobacco. She reports that she does not drink alcohol or use illicit drugs.  IMMUNIZATIONS:  Most Recent Immunizations   Administered Date(s) Administered     Influenza (High Dose) 3 valent vaccine 11/20/2015      Influenza (IIV3) 10/01/2014     Pneumococcal 23 valent 06/24/2015     Tdap (Adacel,Boostrix) 11/20/2015     Above immunizations pulled from [facility]. [facility] and facility records also reconciled. Outstanding information sent to  to update [facility].  Future immunizations are not needed at this point as all recommended immunizations are up to date.   MEDICATIONS:  Current Outpatient Prescriptions   Medication Sig Dispense Refill     haloperidol lactate (HALDOL) 5 MG/ML injection Inject 2 mg into the vein every 6 hours as needed for agitation       DOCUSATE SODIUM PO Take 100 mg by mouth 2 times daily       BusPIRone HCl (BUSPAR PO) Take 5 mg by mouth 3 times daily        insulin aspart (NOVOLOG PEN) 100 UNIT/ML soln Inject 3 Units Subcutaneous daily (with breakfast) (Patient taking differently: Inject 8 Units Subcutaneous daily (with breakfast) )       trolamine salicylate (ASPERCREME) 10 % cream Apply topically as needed for moderate pain       hydrocortisone (ANUSOL-HC) 2.5 % rectal cream Place rectally 2 times daily       hypromellose (ARTIFICIAL TEARS) 0.5 % SOLN 1 drop 3 times daily as needed for dry eyes       nystatin (MYCOSTATIN) 682059 UNIT/GM POWD        Cranberry-Vitamin C-Inulin (UTI-STAT PO) Take by mouth daily       QUEtiapine Fumarate (SEROQUEL PO) Take 25 mg by mouth 2 times daily Also 12.5 QD PRN       bisacodyl (DULCOLAX) 10 MG suppository Place 10 mg rectally daily as needed for constipation       Carvedilol (COREG PO) Take 25 mg by mouth 2 times daily (with meals)       Venlafaxine HCl (EFFEXOR XR PO) Take 225 mg by mouth daily (with breakfast)       Acetaminophen (TYLENOL PO) Take 650 mg by mouth 2 times daily Also TID PRN       ASPIRIN PO Take 81 mg by mouth daily       LISINOPRIL PO Take 40 mg by mouth daily        Gabapentin (NEURONTIN PO) Take 600 mg by mouth 2 times daily       fluticasone (FLONASE) 50 MCG/ACT nasal spray Spray 1 spray into both nostrils At Bedtime        insulin glargine (LANTUS) 100 UNIT/ML PEN Inject 18 Units Subcutaneous every morning        sennosides (SENOKOT) 8.6 MG tablet Take 2 tablets by mouth 2 times daily        polyethylene glycol (MIRALAX/GLYCOLAX) powder Take 17 g by mouth daily        alum & mag hydroxide-simethicone (MAALOX ADVANCED MAX ST) 400-400-40 MG/5ML SUSP Take 30 mLs by mouth every 4 hours as needed PRN for for stomach distress  0     nitroglycerin (NITROSTAT) 0.4 MG SL tablet Place 1 tablet (0.4 mg) under the tongue every 5 minutes as needed 25 tablet      levETIRAcetam (KEPPRA) 100 MG/ML solution Take 7.5 mLs (750 mg) by mouth every 12 hours       traZODone (DESYREL) 50 MG tablet Take 1 tablet (50 mg) by mouth At Bedtime 31 tablet 12     amLODIPine (NORVASC) 10 MG tablet Take 1 tablet (10 mg) by mouth daily 90 tablet 3     Medications reviewed:  Medications reconciled to facility chart and changes were made to reflect current medications as identified as above med list. Below are the changes that were made:   Medications stopped since last EPIC medication reconciliation:   There are no discontinued medications.    Medications started since last [facility] medication reconciliation:  No orders of the defined types were placed in this encounter.      Case Management:  I have reviewed the facility/SNF care plan/MDS which was done 3/23/17, including the falls risk, nutrition and pain screening. I also reviewed the current immunizations, and preventive care..Future cancer screening is not clinically indicated secondary to age/goals of care Patient's desire to return to the community is not assessible due to cognitive impairment. Current Level of Care is appropriate.    Advance Directive Discussion:    I reviewed the current advanced directives as reflected in [facility] and the facility chart. I contacted the first party Zion (pt's son, POA) and discussed the plan of Care. I reviewed the POLST, resigned, dated and sent to [facility].  I did not  due to cognitive impairment review the advance directives with the resident.     Team Discussion:  I communicated with the appropriate disciplines involved with the Plan of Care:   Nursing    Patient Goal:  Patient's goal is unobtainable secondary to cognitive impairment.    Information reviewed:  Medications, vital signs, orders, and nursing notes.    ROS:  Unobtainable secondary to cognitive impairment or aphasia.    Exam:  /82  Pulse 93  Temp 98.1  F (36.7  C)  Resp 18  Wt 181 lb 12.8 oz (82.5 kg)  SpO2 90%  BMI 43.83 kg/m2  GENERAL APPEARANCE:  Alert, in no distress, slightly anxious but moderately redirectable, confused  ENT:  Mouth and posterior oropharynx normal, moist mucous membranes, hearing acuity [place]  EYES:  EOM, conjunctivae, lids, pupils and irises normal  NECK:  No adenopathy,masses or thyromegaly  RESP:  respiratory effort and palpation of chest normal, no respiratory distress, Lung sounds clear  CV:  Palpation and auscultation of heart done , rate and rhythm regular, no  murmur, no rub or gallop, Edema none  ABDOMEN:  Slightly obese, normal bowel sounds, soft, nontender, no hepatosplenomegaly or other masses  M/S:   Gait and station with WC mobility, Digits and nails at baseline  SKIN:  Inspection/Palpation of skin and subcutaneous tissue pale, some bruising present but intact  NEURO: 2-12 in normal limits and at patient's baseline  PSYCH:  insight and judgement, memory with severe impairment, A&O x 0-1 (person) at today's visit , affect and mood normal      Lab/Diagnostic data:    Results for orders placed or performed in visit on 03/06/17   Basic metabolic panel   Result Value Ref Range    Sodium 144 133 - 144 mmol/L    Potassium 3.9 3.4 - 5.3 mmol/L    Chloride 104 94 - 109 mmol/L    Carbon Dioxide 30 20 - 32 mmol/L    Anion Gap 10 3 - 14 mmol/L    Glucose 152 (H) 70 - 99 mg/dL    Urea Nitrogen 24 7 - 30 mg/dL    Creatinine 1.12 (H) 0.52 - 1.04 mg/dL    GFR Estimate 46 (L) >60  mL/min/1.7m2    GFR Estimate If Black 56 (L) >60 mL/min/1.7m2    Calcium 8.7 8.5 - 10.1 mg/dL         ASSESSMENT/PLAN  Hx of Nontraumatic hemorrhage of cerebral hemisphere, unspecified laterality (H)/Convulsions, unspecified convulsion type (H)  Stable on ASA and levetiracetam. - no seizures    Paranoia (H)/Vascular dementia with behavior disturbance/Depression, unspecified depression type/Anxiety  Recent increase in medications d/t psychosis. GDR failed in past.  Family requested more medication support at that time.  Stable today on current medication regimen with PRN meds available if episodes of increased behavior present.     Type 2 diabetes mellitus with complication, with long-term current use of insulin (H)  Upcoming A1c 4/26/17 - will monitor for result and adjust meds as able.     HTN, goal below 140/90/Old myocardial infarction/  LVH (left ventricular hypertrophy) due to hypertensive disease, with heart failure (H)/Diastolic dysfunction, left ventricle, grade I by Echo/Coronary artery disease involving native coronary artery of native heart without angina pectoris/Mitral valve insufficiency, unspecified etiology/Aortic stenosis, mild - per Echo  Weights stable  SBP goal 130-150    Chronic obstructive pulmonary disease, unspecified COPD type (H)  Stable on no meds  Will monitor    CKD stage 3, due to effects of DM II and HTN GFR 9/2012 51; 4/2012 59; 12/11 53; 10/2011 59  Recent labs.  Will order labs in future to assess  Goal: Avoid nephrotoxic agents and mindful prescribing with renal dosing.     Gastroesophageal reflux disease, esophagitis presence not specified  No GI bleed s/s  Stable    Chronic midline low back pain with sciatica, sciatica laterality unspecified/Neck pain on right side  Stable at this time.  Behaviors more related to anxiety and paranoia, seemingly not pain.       Orders:  No changes at this time.  Polst Updated.    Electronically signed by:  LORI Ricks CNP

## 2017-04-26 ENCOUNTER — HOSPITAL LABORATORY (OUTPATIENT)
Dept: NURSING HOME | Facility: OTHER | Age: 82
End: 2017-04-26

## 2017-04-26 LAB — HBA1C MFR BLD: 7.5 % (ref 4.3–6)

## 2017-05-10 VITALS
SYSTOLIC BLOOD PRESSURE: 155 MMHG | BODY MASS INDEX: 44.44 KG/M2 | TEMPERATURE: 97.8 F | WEIGHT: 184.3 LBS | DIASTOLIC BLOOD PRESSURE: 90 MMHG | HEART RATE: 96 BPM | RESPIRATION RATE: 20 BRPM | OXYGEN SATURATION: 98 %

## 2017-05-10 NOTE — PROGRESS NOTES
[place] [facility]    Chief Complaint   Patient presents with     [facility]idential Regulatory       HPI:    Jayne Vasquez is a 86 year old  (9/21/1930), who is being seen today for a federally mandated E/M visit [facility] . Today's concerns are:  Hx of Nontraumatic hemorrhage of cerebral hemisphere, unspecified laterality (H)  - no new weakness. No sz like activity.     Type 2 diabetes mellitus with complication, with long-term current use of insulin (H)  - RN reported Glucose today was 80  But Resident did not have symptoms.   -  Resident diet of choice      HTN, goal below 140/90  - No CP, HA or fainting      Frail elderly  - Continues to require assistance with ADLs      Vascular dementia with behavior disturbance  - reportedly Resident continues to  Believe people steal her cloths on laundry day.  -  Reportedly continued to have hallucination, which required anti-psychotic meds to be given.       _________________________________________  - Past Medical, social, family histories, medications, and allergies reviewed and updated    MEDICATIONS:  Current Outpatient Prescriptions   Medication Sig Dispense Refill     insulin aspart (NOVOLOG FLEXPEN) 100 UNIT/ML injection Inject 8 Units Subcutaneous daily       DOCUSATE SODIUM PO Take 100 mg by mouth 2 times daily       BusPIRone HCl (BUSPAR PO) Take 5 mg by mouth 3 times daily        trolamine salicylate (ASPERCREME) 10 % cream Apply topically as needed for moderate pain       hydrocortisone (ANUSOL-HC) 2.5 % rectal cream Place rectally 2 times daily       hypromellose (ARTIFICIAL TEARS) 0.5 % SOLN 1 drop 3 times daily as needed for dry eyes       nystatin (MYCOSTATIN) [id number] UNIT/GM POWD        Cranberry-Vitamin C-Inulin (UTI-STAT PO) Take by mouth daily       QUEtiapine Fumarate (SEROQUEL PO) Take 25 mg by mouth 2 times daily Also 12.5 QD PRN       bisacodyl (DULCOLAX) 10 MG suppository Place 10 mg rectally daily as needed  for constipation       Carvedilol (COREG PO) Take 25 mg by mouth 2 times daily (with meals)       Venlafaxine HCl (EFFEXOR XR PO) Take 225 mg by mouth daily (with breakfast)       Acetaminophen (TYLENOL PO) Take 650 mg by mouth 2 times daily Also TID PRN       ASPIRIN PO Take 81 mg by mouth daily       LISINOPRIL PO Take 40 mg by mouth daily        fluticasone (FLONASE) 50 MCG/ACT nasal spray Spray 1 spray into both nostrils At Bedtime       insulin glargine (LANTUS) 100 UNIT/ML PEN Inject 18 Units Subcutaneous every morning        sennosides (SENOKOT) 8.6 MG tablet Take 2 tablets by mouth 2 times daily        polyethylene glycol (MIRALAX/GLYCOLAX) powder Take 17 g by mouth daily        alum & mag hydroxide-simethicone (MAALOX ADVANCED MAX ST) 400-400-40 MG/5ML SUSP Take 30 mLs by mouth every 4 hours as needed PRN for for stomach distress  0     nitroglycerin (NITROSTAT) 0.4 MG SL tablet Place 1 tablet (0.4 mg) under the tongue every 5 minutes as needed 25 tablet      levETIRAcetam (KEPPRA) 100 MG/ML solution Take 7.5 mLs (750 mg) by mouth every 12 hours       traZODone (DESYREL) 50 MG tablet Take 1 tablet (50 mg) by mouth At Bedtime 31 tablet 12     amLODIPine (NORVASC) 10 MG tablet Take 1 tablet (10 mg) by mouth daily 90 tablet 3     haloperidol (HALDOL) 1 MG tablet Take 1 mg by mouth daily as needed for agitation       GABAPENTIN PO Take 600 mg by mouth every morning Also 900 QHS       Medications reviewed: Reviewed in the chart and EHR.      Case Management:  I have reviewed the care plan and MDS and do agree with the plan. Patient's desire to return to the community is not present.  Information reviewed:  Medications, vital signs, orders, and nursing notes.    ROS:  10 point ROS of systems including Constitutional, Eyes, Respiratory, Cardiovascular, Gastroenterology, Genitourinary, Integumentary, Muscularskeletal, Psychiatric were all negative except for pertinent positives noted in my HPI.    Exam:  Vitals: BP  155/90  Pulse 96  Temp 97.8  F (36.6  C)  Resp 20  Wt 184 lb 4.8 oz (83.6 kg)  SpO2 98%  BMI 44.44 kg/m2  BMI= Body mass index is 44.44 kg/(m^2).  GENERAL APPEARANCE:  sleepy, opened eyes with a shoulder shake.   ENT:  Mouth and posterior oropharynx normal, moist mucous membranes  EYES:  EOM, conjunctivae, lids, pupils and irises normal, Left ptosis. mild  NECK:  No adenopathy,masses or thyromegaly  RESP:  respiratory effort and palpation of chest normal, lungs clear to auscultation   CV:  Palpation and auscultation of heart done , regular rate and rhythm, no murmur, rub, or gallop, no edema  ABDOMEN:  normal bowel sounds, soft, nontender, no hepatosplenomegaly or other masses  M/S:   Ambulate using the wheelchair. Self propel  SKIN:  Inspection of skin and subcutaneous tissue baseline, Palpation of skin and subcutaneous tissue baseline  NEURO:   No neuro focal deficit noted  PSYCH:  oriented to Name only. Memory and judgment impaired.     Lab/Diagnostic data:  Reviewed in the chart and EHR.          ASSESSMENT/PLAN  Hx of Nontraumatic hemorrhage of cerebral hemisphere, unspecified laterality (H)  - Late effects from fall resulting in hemorrhage of cerebral hemisphere more than a year ago  - Pt is a WC self propel with feet. ADLs dependant.    - Stable        Type 2 diabetes mellitus with complication, with long-term current use of insulin (H)   Lab Results   Component Value Date    A1C 7.5 04/26/2017    A1C 7.3 01/06/2017    A1C  10/26/2016   - on plants 18 units in am and nov log 8 units with breakfast.   - Make sure novolog is given after meal.   -  Keep HbA1C b/w 8-9% (per [employer]GS there is a potential harm in lowering A1C to <6.5 % in older adults with diabetes), life expectancy less than 5 years, tight glucose control is note recommended      CKD stage 3, due to effects of DM II and HTN   - GFR 9/2012 51; 4/2012 59; 12/11 53; 10/2011 59  - - Avoid nephrotoxic drugs  - Renal dose the medications.       HTN,  goal below 140/90  - controlled.  - Keep SBP> 130 mmHg and DBP > 65 mmHg (levels below these increase mortality as shown by standard studies and observations).     Frail elderly  - Significant  Deficits requiring NH placement. Requiring extensive assistance from nursing. Up for meals only o/w spends the day resting in bed      Vascular dementia with behavior disturbance  - Continue to anticipate needs. Chronic condition, ongoing decline expected.   -  Continue to provide redirection and reassurance as needed. Maintain safe living situation with goals focused on comfort.  - continue anti-psychotic given Resident's psychosis. Continue to monitor.       Orders:   The current medical regimen is effective;  continue present plan and medications.      Electronically signed by:  Jesse Gonzalez MD

## 2017-05-11 ENCOUNTER — NURSING HOME VISIT (OUTPATIENT)
Dept: GERIATRICS | Facility: CLINIC | Age: 82
End: 2017-05-11
Payer: COMMERCIAL

## 2017-05-11 DIAGNOSIS — R54 FRAIL ELDERLY: ICD-10-CM

## 2017-05-11 DIAGNOSIS — I61.2 NONTRAUMATIC HEMORRHAGE OF CEREBRAL HEMISPHERE, UNSPECIFIED LATERALITY (H): Primary | ICD-10-CM

## 2017-05-11 DIAGNOSIS — E11.8 TYPE 2 DIABETES MELLITUS WITH COMPLICATION, WITH LONG-TERM CURRENT USE OF INSULIN (H): ICD-10-CM

## 2017-05-11 DIAGNOSIS — Z79.4 TYPE 2 DIABETES MELLITUS WITH COMPLICATION, WITH LONG-TERM CURRENT USE OF INSULIN (H): ICD-10-CM

## 2017-05-11 DIAGNOSIS — I10 HTN, GOAL BELOW 140/90: ICD-10-CM

## 2017-05-11 DIAGNOSIS — N18.30 CKD (CHRONIC KIDNEY DISEASE) STAGE 3, GFR 30-59 ML/MIN (H): ICD-10-CM

## 2017-05-11 DIAGNOSIS — F01.518 VASCULAR DEMENTIA WITH BEHAVIOR DISTURBANCE (H): ICD-10-CM

## 2017-05-11 PROCEDURE — 99207 ZZC CDG-CORRECTLY CODED, REVIEWED AND AGREE: CPT | Performed by: FAMILY MEDICINE

## 2017-05-11 PROCEDURE — 99310 SBSQ NF CARE HIGH MDM 45: CPT | Performed by: FAMILY MEDICINE

## 2017-05-15 ENCOUNTER — HOSPITAL LABORATORY (OUTPATIENT)
Dept: NURSING HOME | Facility: OTHER | Age: 82
End: 2017-05-15

## 2017-05-15 LAB
ALBUMIN UR-MCNC: 30 MG/DL
APPEARANCE UR: ABNORMAL
BACTERIA #/AREA URNS HPF: ABNORMAL /HPF
BILIRUB UR QL STRIP: NEGATIVE
COLOR UR AUTO: ABNORMAL
GLUCOSE UR STRIP-MCNC: NEGATIVE MG/DL
HGB UR QL STRIP: NEGATIVE
KETONES UR STRIP-MCNC: 5 MG/DL
LEUKOCYTE ESTERASE UR QL STRIP: NEGATIVE
MUCOUS THREADS #/AREA URNS LPF: PRESENT /LPF
NITRATE UR QL: NEGATIVE
PH UR STRIP: 5 PH (ref 5–7)
RBC #/AREA URNS AUTO: 2 /HPF (ref 0–2)
SP GR UR STRIP: 1.03 (ref 1–1.03)
SQUAMOUS #/AREA URNS AUTO: 1 /HPF (ref 0–1)
URN SPEC COLLECT METH UR: ABNORMAL
UROBILINOGEN UR STRIP-MCNC: 0 MG/DL (ref 0–2)
WBC #/AREA URNS AUTO: 2 /HPF (ref 0–2)

## 2017-05-16 ENCOUNTER — CLINICAL UPDATE (OUTPATIENT)
Dept: PHARMACY | Facility: CLINIC | Age: 82
End: 2017-05-16

## 2017-05-17 LAB
BACTERIA SPEC CULT: NORMAL
MICRO REPORT STATUS: NORMAL
SPECIMEN SOURCE: NORMAL

## 2017-05-22 ENCOUNTER — NURSING HOME VISIT (OUTPATIENT)
Dept: GERIATRICS | Facility: CLINIC | Age: 82
End: 2017-05-22
Payer: COMMERCIAL

## 2017-05-22 VITALS
DIASTOLIC BLOOD PRESSURE: 81 MMHG | BODY MASS INDEX: 44.44 KG/M2 | HEART RATE: 91 BPM | WEIGHT: 184.3 LBS | RESPIRATION RATE: 20 BRPM | OXYGEN SATURATION: 98 % | TEMPERATURE: 97.3 F | SYSTOLIC BLOOD PRESSURE: 195 MMHG

## 2017-05-22 DIAGNOSIS — R44.1 VISUAL HALLUCINATIONS: ICD-10-CM

## 2017-05-22 DIAGNOSIS — F22 PARANOIA (H): ICD-10-CM

## 2017-05-22 DIAGNOSIS — G57.93 NEUROPATHY INVOLVING BOTH LOWER EXTREMITIES: Primary | ICD-10-CM

## 2017-05-22 DIAGNOSIS — I70.219 ATHEROSCLEROTIC PERIPHERAL VASCULAR DISEASE WITH INTERMITTENT CLAUDICATION (H): ICD-10-CM

## 2017-05-22 DIAGNOSIS — F01.518 VASCULAR DEMENTIA WITH BEHAVIOR DISTURBANCE (H): ICD-10-CM

## 2017-05-22 DIAGNOSIS — F41.9 ANXIETY: ICD-10-CM

## 2017-05-22 PROCEDURE — 99309 SBSQ NF CARE MODERATE MDM 30: CPT | Performed by: NURSE PRACTITIONER

## 2017-05-22 PROCEDURE — 99207 ZZC CDG-CORRECTLY CODED, REVIEWED AND AGREE: CPT | Performed by: NURSE PRACTITIONER

## 2017-05-22 RX ORDER — HALOPERIDOL 1 MG/1
1 TABLET ORAL DAILY PRN
COMMUNITY
End: 2017-11-02

## 2017-05-22 NOTE — PROGRESS NOTES
"[facility]    Chief Complaint   Patient presents with     Nursing Home Acute       HPI:    Jayne Vasquez is a 86 year old  (9/21/1930), who is being seen today for an episodic care visit at [facility] .  HPI information obtained from: facility staff, patient report, [facility] chart review and family/first contact Zion (pt's son) report.Today's concern is:  Neuropathy involving both lower extremities/Atherosclerotic peripheral vascular disease with intermittent claudication (H)  Patient usually c/o pain in her feet.  Family also noting that patient c/o pain in her feet.  Patient on gabapentin 600 mg BID, aspercreme PRN, Tylenol 650 mg BID and TID PRN.   When asked at visit today - patient endorses pain in her lower legs and feet.     Vascular dementia with behavior disturbance/Paranoia (H)/Visual hallucinations/Anxiety  Recent psychosis where family very upset at patient's paranoia, hallucinations and severe anxiety.  Buspirone changed at that time from BID to TID (now 5 mg TID). Patient on Seroquel 25 mg BID (failed GDR in past). Seroquel 12.5 mg daily PRN (used once) added at that time as well.   Also on Tazodone 50 mg qHS, Effexor  mg daily  Had Haldol 1 mg IM daily PRN for severe psychosis which was DC'd d/t no usage - however the following weekend the on-call was contacted and one injection was given, which reportedly worked well.       3/23/17 BIMS noting \"moderately impaired\" - prev BIMS noting severe impairment    Recent increase in agitation - urine sent for cx - negative for UTI.       ALLERGIES: Dye [contrast dye]; Fluoxetine; Iodine-131; Methocarbamol; Paroxetine; and Penicillins  Past Medical, Surgical, Family and Social History reviewed and updated in [facility].    Current Outpatient Prescriptions   Medication Sig Dispense Refill     haloperidol (HALDOL) 1 MG tablet Take 1 mg by mouth daily as needed for agitation       GABAPENTIN PO Take 600 mg by " mouth every morning Also 900 QHS       insulin aspart (NOVOLOG FLEXPEN) 100 UNIT/ML injection Inject 8 Units Subcutaneous daily       DOCUSATE SODIUM PO Take 100 mg by mouth 2 times daily       BusPIRone HCl (BUSPAR PO) Take 5 mg by mouth 3 times daily        trolamine salicylate (ASPERCREME) 10 % cream Apply topically as needed for moderate pain       hydrocortisone (ANUSOL-HC) 2.5 % rectal cream Place rectally 2 times daily       hypromellose (ARTIFICIAL TEARS) 0.5 % SOLN 1 drop 3 times daily as needed for dry eyes       nystatin (MYCOSTATIN) 929215 UNIT/GM POWD        Cranberry-Vitamin C-Inulin (UTI-STAT PO) Take by mouth daily       QUEtiapine Fumarate (SEROQUEL PO) Take 25 mg by mouth 2 times daily Also 12.5 QD PRN       bisacodyl (DULCOLAX) 10 MG suppository Place 10 mg rectally daily as needed for constipation       Carvedilol (COREG PO) Take 25 mg by mouth 2 times daily (with meals)       Venlafaxine HCl (EFFEXOR XR PO) Take 225 mg by mouth daily (with breakfast)       Acetaminophen (TYLENOL PO) Take 650 mg by mouth 2 times daily Also TID PRN       ASPIRIN PO Take 81 mg by mouth daily       LISINOPRIL PO Take 40 mg by mouth daily        fluticasone (FLONASE) 50 MCG/ACT nasal spray Spray 1 spray into both nostrils At Bedtime       insulin glargine (LANTUS) 100 UNIT/ML PEN Inject 18 Units Subcutaneous every morning        sennosides (SENOKOT) 8.6 MG tablet Take 2 tablets by mouth 2 times daily        polyethylene glycol (MIRALAX/GLYCOLAX) powder Take 17 g by mouth daily        alum & mag hydroxide-simethicone (MAALOX ADVANCED MAX ST) 400-400-40 MG/5ML SUSP Take 30 mLs by mouth every 4 hours as needed PRN for for stomach distress  0     nitroglycerin (NITROSTAT) 0.4 MG SL tablet Place 1 tablet (0.4 mg) under the tongue every 5 minutes as needed 25 tablet      levETIRAcetam (KEPPRA) 100 MG/ML solution Take 7.5 mLs (750 mg) by mouth every 12 hours       traZODone (DESYREL) 50 MG tablet Take 1 tablet (50 mg) by  mouth At Bedtime 31 tablet 12     amLODIPine (NORVASC) 10 MG tablet Take 1 tablet (10 mg) by mouth daily 90 tablet 3     Medications reviewed:  Medications reconciled to facility chart and changes were made to reflect current medications as identified as above med list. Below are the changes that were made:   Medications stopped since last EPIC medication reconciliation:   There are no discontinued medications.    Medications started since last [facility] medication reconciliation:  No orders of the defined types were placed in this encounter.    REVIEW OF SYSTEMS:  Unobtainable secondary to cognitive impairment or aphasia.    Physical Exam:  /81  Pulse 91  Temp 97.3  F (36.3  C)  Resp 20  Wt 184 lb 4.8 oz (83.6 kg)  SpO2 98%  BMI 44.44 kg/m2  GENERAL APPEARANCE:  Alert, in no distress, family present  RESP:  respiratory effort and palpation of chest normal, auscultation of lungs clear , no respiratory distress  CV:  Palpation and auscultation of heart done , rate and rhythm regular, no murmur, scant LE peripheral edema  ABDOMEN:  normal bowel sounds, soft, nontender, no hepatosplenomegaly or other masses  M/S:   Gait and station with WC mobility, Digits and nails at baseline, reduced muscle mass, lower legs and feet very tender and patient pulled away and jumped to palpation, especially on RLE.   SKIN:  Inspection and Palpation of skin and subcutaneous tissue pale, thin, intact  PSYCH:  insight and judgement, memory with sever impairment , affect and mood per baseline, calm today, does not readily follow commands         Recent Labs:    Results for orders placed or performed in visit on 05/15/17   UA with Microscopic   Result Value Ref Range    Color Urine Frieda     Appearance Urine Slightly Cloudy     Glucose Urine Negative NEG mg/dL    Bilirubin Urine Negative NEG    Ketones Urine 5 (A) NEG mg/dL    Specific Gravity Urine 1.031 1.003 - 1.035    Blood Urine Negative NEG    pH Urine 5.0 5.0 - 7.0 pH     Protein Albumin Urine 30 (A) NEG mg/dL    Urobilinogen mg/dL 0.0 0.0 - 2.0 mg/dL    Nitrite Urine Negative NEG    Leukocyte Esterase Urine Negative NEG    Source Unknown     WBC Urine 2 0 - 2 /HPF    RBC Urine 2 0 - 2 /HPF    Bacteria Urine Few (A) NEG /HPF    Squamous Epithelial /HPF Urine 1 0 - 1 /HPF    Mucous Urine Present (A) NEG /LPF   Urine Culture Aerobic Bacterial   Result Value Ref Range    Specimen Description Unknown     Culture Micro <10,000 colonies/mL Mixed gram positive salvatore     Micro Report Status FINAL 05/17/2017          Assessment/Plan:  Neuropathy involving both lower extremities/Atherosclerotic peripheral vascular disease with intermittent claudication (H)  Will  Increase Gabapentin at HS for greater relief of pain,  If not effective, can return to 600 mg BID dosing and can look into increase of Tylenol dosing.     Vascular dementia with behavior disturbance/Paranoia (H)Visual hallucinations/Anxiety  UA/UC negative for UTI as explanation for increase in behaviors. Discussion with patient and family and agreed to re-prescribe Haldol IM as PRN as med was effective and when patient in severe psychosis, she will not take po meds.  Having Haldol IM available may thwart an ED visit if behaviors able to be calmed within SNF.   Family in agreement.   Increase of gabapentin may help with agitation as well,whether from effect on [facility] or reduced pain.   Will monitor.     Orders:  1.  Haldol 1 mg QD PRN.  Dx: delusions/hallucinations  2.  D/c current Gabapentin order.  3.  Gabapentin 600 mg po Q am, 900 mg po QHS.  Dx: neuropathy    Electronically signed by  LORI Ricks CNP

## 2017-05-25 PROBLEM — E66.01 MORBID OBESITY (H): Status: ACTIVE | Noted: 2017-05-25

## 2017-07-24 ENCOUNTER — NURSING HOME VISIT (OUTPATIENT)
Dept: GERIATRICS | Facility: CLINIC | Age: 82
End: 2017-07-24
Payer: COMMERCIAL

## 2017-07-24 VITALS
RESPIRATION RATE: 20 BRPM | OXYGEN SATURATION: 98 % | SYSTOLIC BLOOD PRESSURE: 150 MMHG | DIASTOLIC BLOOD PRESSURE: 62 MMHG | WEIGHT: 185 LBS | HEART RATE: 80 BPM | BODY MASS INDEX: 44.61 KG/M2 | TEMPERATURE: 97.2 F

## 2017-07-24 DIAGNOSIS — F99 INSOMNIA DUE TO OTHER MENTAL DISORDER: ICD-10-CM

## 2017-07-24 DIAGNOSIS — I25.10 CORONARY ARTERY DISEASE INVOLVING NATIVE CORONARY ARTERY OF NATIVE HEART WITHOUT ANGINA PECTORIS: ICD-10-CM

## 2017-07-24 DIAGNOSIS — K59.01 SLOW TRANSIT CONSTIPATION: ICD-10-CM

## 2017-07-24 DIAGNOSIS — G99.0 PERIPHERAL AUTONOMIC NEUROPATHY IN DISORDERS CLASSIFIED ELSEWHERE: ICD-10-CM

## 2017-07-24 DIAGNOSIS — R56.9 CONVULSIONS, UNSPECIFIED CONVULSION TYPE (H): ICD-10-CM

## 2017-07-24 DIAGNOSIS — F51.05 INSOMNIA DUE TO OTHER MENTAL DISORDER: ICD-10-CM

## 2017-07-24 DIAGNOSIS — Z79.4 TYPE 2 DIABETES MELLITUS WITH COMPLICATION, WITH LONG-TERM CURRENT USE OF INSULIN (H): ICD-10-CM

## 2017-07-24 DIAGNOSIS — F41.9 ANXIETY: ICD-10-CM

## 2017-07-24 DIAGNOSIS — N18.30 CKD (CHRONIC KIDNEY DISEASE) STAGE 3, GFR 30-59 ML/MIN (H): ICD-10-CM

## 2017-07-24 DIAGNOSIS — E11.8 TYPE 2 DIABETES MELLITUS WITH COMPLICATION, WITH LONG-TERM CURRENT USE OF INSULIN (H): ICD-10-CM

## 2017-07-24 DIAGNOSIS — I34.0 MITRAL VALVE INSUFFICIENCY, UNSPECIFIED ETIOLOGY: ICD-10-CM

## 2017-07-24 DIAGNOSIS — I10 HTN, GOAL BELOW 140/90: ICD-10-CM

## 2017-07-24 DIAGNOSIS — I25.2 OLD MYOCARDIAL INFARCTION: ICD-10-CM

## 2017-07-24 DIAGNOSIS — I35.0 AORTIC STENOSIS, MILD: ICD-10-CM

## 2017-07-24 DIAGNOSIS — I51.9 DIASTOLIC DYSFUNCTION, LEFT VENTRICLE: ICD-10-CM

## 2017-07-24 DIAGNOSIS — I48.20 CHRONIC ATRIAL FIBRILLATION (H): ICD-10-CM

## 2017-07-24 DIAGNOSIS — J44.9 CHRONIC OBSTRUCTIVE PULMONARY DISEASE, UNSPECIFIED COPD TYPE (H): ICD-10-CM

## 2017-07-24 DIAGNOSIS — F01.518 VASCULAR DEMENTIA WITH BEHAVIOR DISTURBANCE (H): Primary | ICD-10-CM

## 2017-07-24 DIAGNOSIS — F22 PARANOIA (H): ICD-10-CM

## 2017-07-24 PROCEDURE — 99309 SBSQ NF CARE MODERATE MDM 30: CPT | Performed by: NURSE PRACTITIONER

## 2017-07-24 PROCEDURE — 99207 ZZC CDG-CORRECTLY CODED, REVIEWED AND AGREE: CPT | Performed by: NURSE PRACTITIONER

## 2017-07-24 RX ORDER — SORBITOL SOLUTION 70 %
30 SOLUTION, ORAL MISCELLANEOUS DAILY PRN
COMMUNITY
End: 2018-01-01
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
## 2017-07-26 ENCOUNTER — HOSPITAL LABORATORY (OUTPATIENT)
Dept: NURSING HOME | Facility: OTHER | Age: 82
End: 2017-07-26

## 2017-07-27 LAB — LEVETIRACETAM SERPL-MCNC: 29 UG/ML

## 2017-07-31 ENCOUNTER — HOSPITAL LABORATORY (OUTPATIENT)
Dept: NURSING HOME | Facility: OTHER | Age: 82
End: 2017-07-31

## 2017-07-31 LAB
ANION GAP SERPL CALCULATED.3IONS-SCNC: 7 MMOL/L (ref 3–14)
BUN SERPL-MCNC: 29 MG/DL (ref 7–30)
CALCIUM SERPL-MCNC: 8.5 MG/DL (ref 8.5–10.1)
CHLORIDE SERPL-SCNC: 103 MMOL/L (ref 94–109)
CO2 SERPL-SCNC: 31 MMOL/L (ref 20–32)
CREAT SERPL-MCNC: 1.4 MG/DL (ref 0.52–1.04)
GFR SERPL CREATININE-BSD FRML MDRD: 36 ML/MIN/1.7M2
GLUCOSE SERPL-MCNC: 193 MG/DL (ref 70–99)
HBA1C MFR BLD: 7.6 % (ref 4.3–6)
POTASSIUM SERPL-SCNC: 4.3 MMOL/L (ref 3.4–5.3)
SODIUM SERPL-SCNC: 141 MMOL/L (ref 133–144)

## 2017-09-01 ENCOUNTER — HOSPITAL LABORATORY (OUTPATIENT)
Dept: NURSING HOME | Facility: OTHER | Age: 82
End: 2017-09-01

## 2017-09-01 LAB
BUN SERPL-MCNC: 25 MG/DL (ref 7–30)
CREAT SERPL-MCNC: 1.33 MG/DL (ref 0.52–1.04)
ERYTHROCYTE [DISTWIDTH] IN BLOOD BY AUTOMATED COUNT: 12.5 % (ref 10–15)
GFR SERPL CREATININE-BSD FRML MDRD: 38 ML/MIN/1.7M2
GLUCOSE SERPL-MCNC: 135 MG/DL (ref 70–99)
HCT VFR BLD AUTO: 35.3 % (ref 35–47)
HGB BLD-MCNC: 11.9 G/DL (ref 11.7–15.7)
MAGNESIUM SERPL-MCNC: 2.2 MG/DL (ref 1.6–2.3)
MCH RBC QN AUTO: 32.1 PG (ref 26.5–33)
MCHC RBC AUTO-ENTMCNC: 33.7 G/DL (ref 31.5–36.5)
MCV RBC AUTO: 95 FL (ref 78–100)
PLATELET # BLD AUTO: 124 10E9/L (ref 150–450)
RBC # BLD AUTO: 3.71 10E12/L (ref 3.8–5.2)
WBC # BLD AUTO: 4.6 10E9/L (ref 4–11)

## 2017-09-13 VITALS
BODY MASS INDEX: 45.09 KG/M2 | RESPIRATION RATE: 18 BRPM | TEMPERATURE: 98.1 F | WEIGHT: 187 LBS | SYSTOLIC BLOOD PRESSURE: 160 MMHG | DIASTOLIC BLOOD PRESSURE: 87 MMHG | OXYGEN SATURATION: 96 % | HEART RATE: 78 BPM

## 2017-09-13 NOTE — PROGRESS NOTES
[facility]    Chief Complaint   Patient presents with     assisted Regulatory       HPI:    Jayne Vasquez is a 86 year old  (9/21/1930), who is being seen today for a federally mandated E/M visit at [facility] .  HPI information obtained from: patient report and GNP at the facility    Today's concerns are:   - Resident seen and examined.   - Reports sleep, appetite and BM are fine.   - reports tingling like sensation in the legs.   -  GNP reports Resident has  A lot of paranoia, and hallucinations resulted in past ER visits, Resident believes men are coming  to beat her up, etc... Has not required haldol or Seroquel prn.     ----------------------------------------  # Past Medical, social, family histories, medications, and allergies reviewed and updated  # Medications reviewed: in the chart and EHR.  # Case Management:  I have reviewed the care plan and MDS and do agree with the plan. Patient's desire to return to the community is not present.  Information reviewed:  Medications, vital signs, orders, and nursing notes.    ROS:  -  Limited due to the Resident's dementia, otherwise negative except as in the HPI    Exam:  Vitals: /87  Pulse 78  Temp 98.1  F (36.7  C)  Resp 18  Wt 187 lb (84.8 kg)  SpO2 96%  BMI 45.09 kg/m2  BMI= Body mass index is 45.09 kg/(m^2).  GENERAL APPEARANCE:  sleepy, opened eyes with a shoulder shake.   ENT:  Mouth and posterior oropharynx normal, moist mucous membranes  EYES:  EOM, conjunctivae, lids, pupils and irises normal, Left ptosis. mild  RESP:  respiratory effort and palpation of chest normal, lungs clear to auscultation   CV:  Palpation and auscultation of heart done , regular rate and rhythm, Systolic murmur, rub, or gallop, traces pedal edema  ABDOMEN:  normal bowel sounds, soft, nontender, no hepatosplenomegaly or other masses  M/S:   Ambulate using the wheelchair. Self propel  SKIN:  Inspection of skin and  subcutaneous tissue baseline, Palpation of skin and subcutaneous tissue baseline  NEURO:   No neuro focal deficit noted  PSYCH:  oriented to Name only. Memory and judgment impaired    Lab/Diagnostic data:    Results for orders placed or performed in visit on 09/01/17   Urea nitrogen   Result Value Ref Range    Urea Nitrogen 25 7 - 30 mg/dL   CBC with platelets   Result Value Ref Range    WBC 4.6 4.0 - 11.0 10e9/L    RBC Count 3.71 (L) 3.8 - 5.2 10e12/L    Hemoglobin 11.9 11.7 - 15.7 g/dL    Hematocrit 35.3 35.0 - 47.0 %    MCV 95 78 - 100 fl    MCH 32.1 26.5 - 33.0 pg    MCHC 33.7 31.5 - 36.5 g/dL    RDW 12.5 10.0 - 15.0 %    Platelet Count 124 (L) 150 - 450 10e9/L   Creatinine   Result Value Ref Range    Creatinine 1.33 (H) 0.52 - 1.04 mg/dL    GFR Estimate 38 (L) >60 mL/min/1.7m2    GFR Estimate If Black 46 (L) >60 mL/min/1.7m2   Glucose   Result Value Ref Range    Glucose 135 (H) 70 - 99 mg/dL   Magnesium   Result Value Ref Range    Magnesium 2.2 1.6 - 2.3 mg/dL         ASSESSMENT/PLAN  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   - However, when Resident is agitated, PO anti-psychotic meds cannot be given but IM haldol to avoid ED visit- which happened in the past unfortunately.  Will keep Haldol IM order.   - on Effexor,  Buspar, and trazodone  - stable for now. Continue to monitor.     CKD stage 3, due to effects of DM II and HTN  - GFR 38 from 36, improving.   - - Avoid nephrotoxic drugs  - Renal dose the medications.       Type 2 diabetes mellitus with complication, with long-term current use of insulin (H)   Lab Results   Component Value Date    A1C 7.6 07/31/2017    A1C 7.5 04/26/2017   - on lantus  18 units and novolog 8 units prior to breakfast. .   - controlled  -  Keep HbA1C b/w 8-9% (per AGS there is a potential harm in lowering A1C to  <6.5 % in older adults with diabetes), life expectancy less than 5 years, tight glucose control is note recommended  - Accu check in am for  one week and adjust insulin accordingly.       Frail elderly  - Significant  Deficits requiring NH placement. Requiring extensive assistance from nursing. Up for meals only o/w spends the day resting in bed      Vascular dementia with behavior disturbance  - BIMS  C/w moderately impaired cognitive level.   - Continue to anticipate needs. Chronic condition, ongoing decline expected.   -  Continue to provide redirection and reassurance as needed. Maintain safe living situation with goals focused on comfort.      Orders:  - See above, otherwise, continue the rest of the current POC.         Electronically signed by:  Jesse Gonzalez MD

## 2017-09-14 ENCOUNTER — NURSING HOME VISIT (OUTPATIENT)
Dept: GERIATRICS | Facility: CLINIC | Age: 82
End: 2017-09-14
Payer: COMMERCIAL

## 2017-09-14 DIAGNOSIS — R54 FRAIL ELDERLY: ICD-10-CM

## 2017-09-14 DIAGNOSIS — F06.0 PSYCHOTIC DISORDER WITH HALLUCINATIONS DUE TO KNOWN PHYSIOLOGICAL CONDITION: Primary | ICD-10-CM

## 2017-09-14 DIAGNOSIS — Z79.4 TYPE 2 DIABETES MELLITUS WITH COMPLICATION, WITH LONG-TERM CURRENT USE OF INSULIN (H): ICD-10-CM

## 2017-09-14 DIAGNOSIS — E11.8 TYPE 2 DIABETES MELLITUS WITH COMPLICATION, WITH LONG-TERM CURRENT USE OF INSULIN (H): ICD-10-CM

## 2017-09-14 DIAGNOSIS — F01.518 VASCULAR DEMENTIA WITH BEHAVIOR DISTURBANCE (H): ICD-10-CM

## 2017-09-14 DIAGNOSIS — N18.30 CKD (CHRONIC KIDNEY DISEASE) STAGE 3, GFR 30-59 ML/MIN (H): ICD-10-CM

## 2017-09-14 DIAGNOSIS — F22: ICD-10-CM

## 2017-09-14 PROCEDURE — 99310 SBSQ NF CARE HIGH MDM 45: CPT | Performed by: FAMILY MEDICINE

## 2017-09-19 ENCOUNTER — NURSING HOME VISIT (OUTPATIENT)
Dept: GERIATRICS | Facility: CLINIC | Age: 82
End: 2017-09-19
Payer: COMMERCIAL

## 2017-09-19 VITALS
TEMPERATURE: 98.1 F | OXYGEN SATURATION: 96 % | SYSTOLIC BLOOD PRESSURE: 186 MMHG | DIASTOLIC BLOOD PRESSURE: 82 MMHG | HEART RATE: 78 BPM | WEIGHT: 187 LBS | RESPIRATION RATE: 18 BRPM | BODY MASS INDEX: 45.09 KG/M2

## 2017-09-19 DIAGNOSIS — F22: ICD-10-CM

## 2017-09-19 DIAGNOSIS — F06.0 PSYCHOTIC DISORDER WITH HALLUCINATIONS DUE TO KNOWN PHYSIOLOGICAL CONDITION: ICD-10-CM

## 2017-09-19 DIAGNOSIS — F32.A DEPRESSION, UNSPECIFIED DEPRESSION TYPE: ICD-10-CM

## 2017-09-19 DIAGNOSIS — F22 PARANOIA (H): ICD-10-CM

## 2017-09-19 DIAGNOSIS — F01.518 VASCULAR DEMENTIA WITH BEHAVIOR DISTURBANCE (H): Primary | ICD-10-CM

## 2017-09-19 DIAGNOSIS — F41.9 ANXIETY: ICD-10-CM

## 2017-09-19 PROCEDURE — 99308 SBSQ NF CARE LOW MDM 20: CPT | Performed by: NURSE PRACTITIONER

## 2017-09-19 NOTE — PROGRESS NOTES
[facility]    Chief Complaint   Patient presents with     Nursing Home Acute       HPI:    Jayne Vasquez is a 86 year old  (9/21/1930), who is being seen today for an episodic care visit at [facility].    HPI information obtained from: facility chart records, facility staff and [facility] Epic chart review. 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  Patient is weepy a lot and making frequent statements about men who are coming to beat her up.  She is often scared.      Nursing notes she is having a particularly hard AM today as she slept in her clothes last night and therefore believes it is now night time.      Patient is met today in the common area where she is finishing breakfast, crying.     REVIEW OF SYSTEMS:  Unobtainable secondary to cognitive impairment or aphasia.    /82  Pulse 78  Temp 98.1  F (36.7  C)  Resp 18  Wt 187 lb (84.8 kg)  SpO2 96%  BMI 45.09 kg/m2  GENERAL APPEARANCE:  Alert, in emotional distress, crying  RESP:  respiratory effort normal, no respiratory distress, LS clear to auscultation  CV:  Mild LE peripheral edema  ABDOMEN:  nondistended  M/S:   Gait and station with WC mobility, Digits and nails at [place]leine, reduced muscle mass  SKIN:  Inspection and Palpation of skin and subcutaneous tissue intact  PSYCH:  insight and judgement, memory with significant impairment , affect and mood agitated/anxious, does not follow commands readily           ASSESSMENT/PLAN:     Vascular dementia with behavior disturbance  Depression, unspecified depression type  Paranoia (H)  Anxiety  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    Nursing reports patient sleeps well so unlikely this is the cause of her agitation and an increase in trazodone will not help her.    Cannot increase Effexor d/t renal function.    Will increase Seroquel to TID dosing and monitor for changes.     Will keep IM Haldol PRN at this time as patient has required hospitalization for behaviors in the past and trend seeming on that track at this time.  Will DC if able.      Orders:  1.  Increase Seroquel to 25 mg po TID.  Dx: dementia w/behaviors, paranoia, delusions    LORI Ricks CNP

## 2017-11-02 ENCOUNTER — NURSING HOME VISIT (OUTPATIENT)
Dept: GERIATRICS | Facility: CLINIC | Age: 82
End: 2017-11-02
Payer: COMMERCIAL

## 2017-11-02 VITALS
SYSTOLIC BLOOD PRESSURE: 139 MMHG | BODY MASS INDEX: 45.91 KG/M2 | RESPIRATION RATE: 21 BRPM | DIASTOLIC BLOOD PRESSURE: 65 MMHG | WEIGHT: 190.4 LBS | OXYGEN SATURATION: 94 % | HEART RATE: 86 BPM | TEMPERATURE: 97.6 F

## 2017-11-02 DIAGNOSIS — I13.0 HYPERTENSIVE HEART AND KIDNEY DISEASE WITH HF AND WITH CKD STAGE I-IV (H): ICD-10-CM

## 2017-11-02 DIAGNOSIS — F32.A DEPRESSION, UNSPECIFIED DEPRESSION TYPE: ICD-10-CM

## 2017-11-02 DIAGNOSIS — I25.10 CORONARY ARTERY DISEASE INVOLVING NATIVE CORONARY ARTERY OF NATIVE HEART WITHOUT ANGINA PECTORIS: ICD-10-CM

## 2017-11-02 DIAGNOSIS — F41.9 ANXIETY: ICD-10-CM

## 2017-11-02 DIAGNOSIS — I51.9 DIASTOLIC DYSFUNCTION, LEFT VENTRICLE: ICD-10-CM

## 2017-11-02 DIAGNOSIS — F06.0 PSYCHOTIC DISORDER WITH HALLUCINATIONS DUE TO KNOWN PHYSIOLOGICAL CONDITION: ICD-10-CM

## 2017-11-02 DIAGNOSIS — E11.8 TYPE 2 DIABETES MELLITUS WITH COMPLICATION, WITH LONG-TERM CURRENT USE OF INSULIN (H): ICD-10-CM

## 2017-11-02 DIAGNOSIS — F01.518 VASCULAR DEMENTIA WITH BEHAVIOR DISTURBANCE (H): Primary | ICD-10-CM

## 2017-11-02 DIAGNOSIS — I25.2 OLD MYOCARDIAL INFARCTION: ICD-10-CM

## 2017-11-02 DIAGNOSIS — N18.30 CKD (CHRONIC KIDNEY DISEASE) STAGE 3, GFR 30-59 ML/MIN (H): ICD-10-CM

## 2017-11-02 DIAGNOSIS — F22 PARANOIA (H): ICD-10-CM

## 2017-11-02 DIAGNOSIS — I61.2 NONTRAUMATIC HEMORRHAGE OF CEREBRAL HEMISPHERE, UNSPECIFIED LATERALITY (H): ICD-10-CM

## 2017-11-02 DIAGNOSIS — I11.0 LVH (LEFT VENTRICULAR HYPERTROPHY) DUE TO HYPERTENSIVE DISEASE, WITH HEART FAILURE (H): ICD-10-CM

## 2017-11-02 DIAGNOSIS — Z79.4 TYPE 2 DIABETES MELLITUS WITH COMPLICATION, WITH LONG-TERM CURRENT USE OF INSULIN (H): ICD-10-CM

## 2017-11-02 PROCEDURE — 99309 SBSQ NF CARE MODERATE MDM 30: CPT | Performed by: NURSE PRACTITIONER

## 2017-11-02 NOTE — PROGRESS NOTES
"  [facility]    Chief Complaint   Patient presents with     long term Regulatory       HPI:    Jayne Vasquez is a 87 year old  (9/21/1930), who is being seen today for a federally mandated E/M visit at [facility] .  HPI information obtained from: facility chart records, facility staff, patient report and [facility] chart review. 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    BIMS - :severely impaired\"  PHQ9 - 2  Behaviors often include crying/agitation, paranoia people are stealing her things, paranoia someone is going to hurt her, delusions of men in her room coming to hurt her,   Nursing reports that most days, patient is redirectable.     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)  Diastolic dysfunction, left ventricle, grade I by Echo  LVH (left ventricular hypertrophy) due to hypertensive disease, with heart failure (H)  Old myocardial infarction  Coronary artery disease involving native coronary artery of native heart without angina pectoris  History CABG 2012  On amlodipine 10 mg daily, ASA 81 mg daily, carvedilol 25 mg BID, lisinopril 40 mg daily, nitro prn    BPs variable (d/t agitation) but mostly " 130-140s/50-70s  HRs 70-80s  Patient unable to comment on CP, HA, lightheadedness     CKD stage 3, due to effects of DM II and HTN  Baseline creat seemingly ~1.0  9/1/17: BUN 25, Ceat 1.33, GFR 38  7/31/17: BUN 29, Creat 1.40, GFR 36  3/6/17: BUN 24, Creat 1.12, GFR 46      ALLERGIES: Dye [contrast dye]; Fluoxetine; Iodine-131; Methocarbamol; Paroxetine; and Penicillins  PAST MEDICAL HISTORY:  has a past medical history of Anemia, unspecified (11/04/10); Aphasia (02/12/10); Cataracts; Cerebral embolism with cerebral infarction (H) (02/16/10); Chest pain (11/04/10); Confusion (01/30/10); Congestive heart failure, unspecified; Coronary atherosclerosis of unspecified type of vessel, native or graft (05/20/08); Diabetes mellitus (H); Diabetic eye exam (H) (3/26/15); Diabetic infection of left foot (2/26/2013); Diastolic dysfunction, left ventricle, grade I by Echo (4/2/2012); GERD (gastroesophageal reflux disease) (11/04/10); GI bleed; History of blood transfusion; History of recurrent UTIs; Hyperlipemia (4.22.11); Hypertension; Hyponatremia; Intermediate coronary syndrome (H); Labral tear of long head of biceps tendon; LVH (left ventricular hypertrophy) due to hypertensive disease - mild-moderate (4/2/2012); Open wound of left foot in 4th interdigital space (2/26/2013); Osteoarthrosis, shoulder region (07/19/09); Rotator cuff tear; Seizure disorder, secondary (H) (02/16/10); Subacromial bursitis; Transient cerebral ischemia (3/30/2012); Unspecified cerebral artery occlusion with cerebral infarction; and Vitamin D deficiency (02/08/10). She also has no past medical history of Asthma; Malignant neoplasm (H); or Thyroid disease.  PAST SURGICAL HISTORY:  has a past surgical history that includes appendectomy; Cholecystectomy; Hysterectomy; surgical history of -; colonoscopy; cataract iol, rt/lt; surgical history of -; surgical history of - (5/2008); surgical history of -; surgical history of -; colonoscopy (5/31/11);  surgical history of - (5/31/11); cardiac catherization (05/20/08); Esophagoscopy, gastroscopy, duodenoscopy (EGD), combined (4/5/2012); Bypass graft artery coronary (4/9/2012); and Phacoemulsification with standard intraocular lens implant (Right, 4/16/2015).  FAMILY HISTORY: family history includes C.A.D. in her father, mother, and son; CANCER in her mother and sister; CEREBROVASCULAR DISEASE in her sister; Neurologic Disorder in her son.  SOCIAL HISTORY:  reports that she has never smoked. She has never used smokeless tobacco. She reports that she does not drink alcohol or use illicit drugs.    MEDICATIONS:  Current Outpatient Prescriptions   Medication Sig Dispense Refill     sorbitol 70 % SOLN solution Take 30 mLs by mouth daily as needed for constipation       GABAPENTIN PO Take 600 mg by mouth every morning Also 900 QHS       insulin aspart (NOVOLOG FLEXPEN) 100 UNIT/ML injection Inject 8 Units Subcutaneous daily       BusPIRone HCl (BUSPAR PO) Take 5 mg by mouth 3 times daily        trolamine salicylate (ASPERCREME) 10 % cream Apply topically as needed for moderate pain       hydrocortisone (ANUSOL-HC) 2.5 % rectal cream Place rectally 3 times daily as needed        hypromellose (ARTIFICIAL TEARS) 0.5 % SOLN 1 drop 3 times daily as needed for dry eyes       nystatin (MYCOSTATIN) 813061 UNIT/GM POWD        Cranberry-Vitamin C-Inulin (UTI-STAT PO) Take by mouth daily       QUEtiapine Fumarate (SEROQUEL PO) Take 25 mg by mouth 3 times daily        Carvedilol (COREG PO) Take 25 mg by mouth 2 times daily (with meals)       Venlafaxine HCl (EFFEXOR XR PO) Take 150 mg by mouth At Bedtime        Acetaminophen (TYLENOL PO) Take 650 mg by mouth 2 times daily Also TID PRN       ASPIRIN PO Take 81 mg by mouth daily       LISINOPRIL PO Take 40 mg by mouth daily        fluticasone (FLONASE) 50 MCG/ACT nasal spray Spray 1 spray into both nostrils At Bedtime       insulin glargine (LANTUS) 100 UNIT/ML PEN Inject 18 Units  Subcutaneous every morning        sennosides (SENOKOT) 8.6 MG tablet Take 2 tablets by mouth 2 times daily        polyethylene glycol (MIRALAX/GLYCOLAX) powder Take 17 g by mouth daily        alum & mag hydroxide-simethicone (MAALOX ADVANCED MAX ST) 400-400-40 MG/5ML SUSP Take 30 mLs by mouth every 4 hours as needed PRN for for stomach distress  0     nitroglycerin (NITROSTAT) 0.4 MG SL tablet Place 1 tablet (0.4 mg) under the tongue every 5 minutes as needed 25 tablet      levETIRAcetam (KEPPRA) 100 MG/ML solution Take 7.5 mLs (750 mg) by mouth every 12 hours       traZODone (DESYREL) 50 MG tablet Take 1 tablet (50 mg) by mouth At Bedtime 31 tablet 12     amLODIPine (NORVASC) 10 MG tablet Take 1 tablet (10 mg) by mouth daily 90 tablet 3     Medications reviewed:  Medications reconciled to facility chart and changes were made to reflect current medications as identified as above med list. Below are the changes that were made:   Medications stopped since last EPIC medication reconciliation:   Medications Discontinued During This Encounter   Medication Reason     bisacodyl (DULCOLAX) 10 MG suppository Medication Reconciliation Clean Up     haloperidol (HALDOL) 1 MG tablet Medication Reconciliation Clean Up       Medications started since last [facility] medication reconciliation:  No orders of the defined types were placed in this encounter.    Case Management:  I have reviewed the care plan and MDS and do agree with the plan. Patient's desire to return to the community is not assessible due to cognitive impairment.  Information reviewed:  Medications, vital signs, orders, and nursing notes.    ROS:  Unobtainable secondary to cognitive impairment or aphasia.    Exam:  Vitals: /65  Pulse 86  Temp 97.6  F (36.4  C)  Resp 21  Wt 190 lb 6.4 oz (86.4 kg)  SpO2 94%  BMI 45.91 kg/m2  BMI= Body mass index is 45.91 kg/(m^2).  GENERAL APPEARANCE:  Alert, in no overt distress, esily agitated  RESP:  respiratory effort and  palpation of chest normal, auscultation of lungs clear , no respiratory distress  CV:  Palpation and auscultation of heart done , rate and rhythm irregular, no murmur, no LE peripheral edema  ABDOMEN:  normal bowel sounds, soft, nontender, no hepatosplenomegaly or other masses  M/S:   Gait and station with WC mobility, Digits and nails with arthritic changes/baseline, slightly reduced muscle mass, very sensitive feet/ankles  SKIN:  Inspection and Palpation of skin and subcutaneous tissue pale, thin, intact  PSYCH:  insight and judgement, memory with severe impairment, affect and mood per baseline, does not follow commands readily         Lab/Diagnostic data:    CBC RESULTS:   Recent Labs   Lab Test  09/01/17   0750  03/03/17   0806   WBC  4.6  4.8   RBC  3.71*  4.04   HGB  11.9  12.2   HCT  35.3  36.5   MCV  95  90   MCH  32.1  30.2   MCHC  33.7  33.4   RDW  12.5  12.4   PLT  124*  147*       Last Basic Metabolic Panel:  Recent Labs   Lab Test  09/01/17   0750  07/31/17   0720  03/06/17   0730   NA   --   141  144   POTASSIUM   --   4.3  3.9   CHLORIDE   --   103  104   ELIZABETH   --   8.5  8.7   CO2   --   31  30   BUN  25  29  24   CR  1.33*  1.40*  1.12*   GLC  135*  193*  152*       Lab Results   Component Value Date    A1C 7.6 07/31/2017    A1C 7.5 04/26/2017             ASSESSMENT/PLAN  Vascular dementia with behavior disturbance  Depression, unspecified depression type  Paranoia (H)  Anxiety  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    IM Haldol remains in facility E-kit which is appropriate to use.  In the past, patient has used IM Haldol when very agitated and unwilling to take po meds.  Would recommend giving IM Haldol 1 mg over sending patient to ED for eval - if behaviors escalate and are not redirectable and patient safety if in  question.     Type 2 diabetes mellitus with complication, with long-term current use of insulin (H)  Unknown stability as no accuchecks.  A1C showing unnecessary tight control.  Will order accuchecks for Novolog monitoring and titration needs. Goal to decrease Novolog if able.   Significant neuropathy present in feet.  Patient seems comfortable if feet are not touched.  Will monitor.     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  HTN occurs with agitation which is expected.   Will monitor for titration needs.     CKD stage 3, due to effects of DM II and HTN  Progressive decline in kidney function  Will order labs for monitoring.       Orders:  1.  D/c PRN Haldol (it remains in E kit).  2.  Accuchecks TID AC x 5 days, then change to Q am.  Dx: DM2  3.  11/20/17 labs: BMP, levetiracetam level.  Dx: CKD, dementia, delusional disorder w/paranoia      Electronically signed by:  LORI Ricks CNP

## 2017-11-20 ENCOUNTER — HOSPITAL LABORATORY (OUTPATIENT)
Dept: NURSING HOME | Facility: OTHER | Age: 82
End: 2017-11-20

## 2017-11-20 LAB
ANION GAP SERPL CALCULATED.3IONS-SCNC: 8 MMOL/L (ref 3–14)
BUN SERPL-MCNC: 28 MG/DL (ref 7–30)
CALCIUM SERPL-MCNC: 8.6 MG/DL (ref 8.5–10.1)
CHLORIDE SERPL-SCNC: 105 MMOL/L (ref 94–109)
CO2 SERPL-SCNC: 29 MMOL/L (ref 20–32)
CREAT SERPL-MCNC: 1.37 MG/DL (ref 0.52–1.04)
GFR SERPL CREATININE-BSD FRML MDRD: 36 ML/MIN/1.7M2
GLUCOSE SERPL-MCNC: 156 MG/DL (ref 70–99)
POTASSIUM SERPL-SCNC: 4.4 MMOL/L (ref 3.4–5.3)
SODIUM SERPL-SCNC: 142 MMOL/L (ref 133–144)

## 2017-11-22 LAB — LEVETIRACETAM SERPL-MCNC: 33 UG/ML (ref 12–46)

## 2017-12-22 ENCOUNTER — NURSING HOME VISIT (OUTPATIENT)
Dept: GERIATRICS | Facility: CLINIC | Age: 82
End: 2017-12-22
Payer: COMMERCIAL

## 2017-12-22 VITALS
HEART RATE: 87 BPM | TEMPERATURE: 96.5 F | RESPIRATION RATE: 18 BRPM | WEIGHT: 191.4 LBS | SYSTOLIC BLOOD PRESSURE: 109 MMHG | BODY MASS INDEX: 46.15 KG/M2 | DIASTOLIC BLOOD PRESSURE: 70 MMHG | OXYGEN SATURATION: 95 %

## 2017-12-22 DIAGNOSIS — F06.0 PSYCHOTIC DISORDER WITH HALLUCINATIONS DUE TO KNOWN PHYSIOLOGICAL CONDITION: ICD-10-CM

## 2017-12-22 DIAGNOSIS — E11.8 TYPE 2 DIABETES MELLITUS WITH COMPLICATION, WITH LONG-TERM CURRENT USE OF INSULIN (H): ICD-10-CM

## 2017-12-22 DIAGNOSIS — F22 PARANOIA (H): ICD-10-CM

## 2017-12-22 DIAGNOSIS — R44.1 VISUAL HALLUCINATIONS: ICD-10-CM

## 2017-12-22 DIAGNOSIS — F32.A DEPRESSION, UNSPECIFIED DEPRESSION TYPE: ICD-10-CM

## 2017-12-22 DIAGNOSIS — F41.9 ANXIETY: ICD-10-CM

## 2017-12-22 DIAGNOSIS — Z79.4 TYPE 2 DIABETES MELLITUS WITH COMPLICATION, WITH LONG-TERM CURRENT USE OF INSULIN (H): ICD-10-CM

## 2017-12-22 DIAGNOSIS — F01.518 VASCULAR DEMENTIA WITH BEHAVIOR DISTURBANCE (H): Primary | ICD-10-CM

## 2017-12-22 DIAGNOSIS — G57.93 NEUROPATHY INVOLVING BOTH LOWER EXTREMITIES: ICD-10-CM

## 2017-12-22 PROCEDURE — 99309 SBSQ NF CARE MODERATE MDM 30: CPT | Performed by: NURSE PRACTITIONER

## 2017-12-22 NOTE — PROGRESS NOTES
[facility]    Chief Complaint   Patient presents with     Nursing Home Acute       HPI:    Jayne Vasquez is a 87 year old  (9/21/1930), who is being seen today for an episodic care visit at [facility] and [facility] .  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    Recently, patient noted to have no behaviors (from nursing notes or verbal report form nursing staff).      BIMS - 0/15  PHQ9 - 3  Noted that patient became agitated and anxious as questioning continued with BIMS/PHQ9.     On buspar 5 mg TID, Effexor  qHS, levetiracetam 750 mg BID (11/20/17 level: 33), seroquel 25 mg TID, Trazodone 50 mg qHS  Also on gabapentin 600 mg qAM and 900 mg qHS or neuropathy    Nursing noting no reports patient not sleeping well.      Type 2 diabetes mellitus with complication, with long-term current use of insulin (H)  Neuropathy involving both lower extremities  on gabapentin 600 mg qAM and 900 mg qHS or neuropathy  On Lantus 18 units qAM, Novolog 8 units with breakfast only    accuchecks qAM: 171, 185, 163, 96, 136, 117, 189, 130, 125, 89, 114, 106    Lab Results   Component Value Date    A1C 7.6 07/31/2017    A1C 7.5 04/26/2017    A1C  01/25/2017     Canceled, Test credited   Duplicate request  CORRECTED ON 01/25 AT 0913: PREVIOUSLY REPORTED AS 7.4      A1C 7.3 01/06/2017    A1C  10/26/2016     Canceled, Test  credited  REFUSED TO SIGN ABN  CORRECTED ON 10/26 AT 1150: PREVIOUSLY REPORTED AS 7.4       Nursing noting today patient very pale, lethargic.   BG checked and was 57 - patient given 240 cc of juice and after a bit, nursing assistant assisting patient with eating lunch.  Nursing noted patient did not eat breakfast this AM (but was given qAM novolog).  this AM (before administration).     ALLERGIES: Dye [contrast dye]; Fluoxetine; Iodine-131; Methocarbamol; Paroxetine; and Penicillins  Past Medical, Surgical, Family and Social History [facility].    Current Outpatient Prescriptions   Medication Sig Dispense Refill     sorbitol 70 % SOLN solution Take 30 mLs by mouth daily as needed for constipation       GABAPENTIN PO Take 600 mg by mouth every morning Also 900 QHS       BusPIRone HCl (BUSPAR PO) Take 5 mg by mouth 3 times daily        trolamine salicylate (ASPERCREME) 10 % cream Apply topically as needed for moderate pain (also BID)        hydrocortisone (ANUSOL-HC) 2.5 % rectal cream Place rectally 3 times daily as needed        hypromellose (ARTIFICIAL TEARS) 0.5 % SOLN 1 drop 3 times daily as needed for dry eyes       nystatin (MYCOSTATIN) 106680 UNIT/GM POWD        Cranberry-Vitamin C-Inulin (UTI-STAT PO) Take by mouth daily       QUEtiapine Fumarate (SEROQUEL PO) Take 25 mg by mouth 3 times daily        Carvedilol (COREG PO) Take 25 mg by mouth 2 times daily (with meals)       Venlafaxine HCl (EFFEXOR XR PO) Take 150 mg by mouth At Bedtime        Acetaminophen (TYLENOL PO) Take 650 mg by mouth 2 times daily Also TID PRN       ASPIRIN PO Take 81 mg by mouth daily       LISINOPRIL PO Take 40 mg by mouth daily        fluticasone (FLONASE) 50 MCG/ACT nasal spray Spray 1 spray into both nostrils At Bedtime       insulin glargine (LANTUS) 100 UNIT/ML PEN Inject 18 Units Subcutaneous every morning        sennosides (SENOKOT) 8.6 MG tablet Take 2 tablets by mouth 2 times daily         polyethylene glycol (MIRALAX/GLYCOLAX) powder Take 17 g by mouth daily        alum & mag hydroxide-simethicone (MAALOX ADVANCED MAX ST) 400-400-40 MG/5ML SUSP Take 30 mLs by mouth every 4 hours as needed PRN for for stomach distress  0     nitroglycerin (NITROSTAT) 0.4 MG SL tablet Place 1 tablet (0.4 mg) under the tongue every 5 minutes as needed 25 tablet      levETIRAcetam (KEPPRA) 100 MG/ML solution Take 7.5 mLs (750 mg) by mouth every 12 hours       traZODone (DESYREL) 50 MG tablet Take 1 tablet (50 mg) by mouth At Bedtime 31 tablet 12     amLODIPine (NORVASC) 10 MG tablet Take 1 tablet (10 mg) by mouth daily 90 tablet 3     Medications reviewed:  Medications reconciled to facility chart and changes were made to reflect current medications as identified as above med list. Below are the changes that were made:   Medications stopped since last EPIC medication reconciliation:   There are no discontinued medications.    Medications started since last [facility] medication reconciliation:  No orders of the defined types were placed in this encounter.    REVIEW OF SYSTEMS:  Unobtainable secondary to cognitive impairment or aphasia.    Physical Exam:  /70  Pulse 87  Temp 96.5  F (35.8  C)  Resp 18  Wt 191 lb 6.4 oz (86.8 kg)  SpO2 95%  BMI 46.15 kg/m2  GENERAL APPEARANCE:  Lethargic, pale, in no distress but concerning stuporous state at this time.   RESP:  respiratory effort and palpation of chest normal, auscultation of lungs clear with slight inspiratory wheeze noted , no respiratory distress  CV:  Palpation and auscultation of heart done , rate and rhythm regular, no murmur, mild LE peripheral edema  ABDOMEN:  Obese, normal bowel sounds, soft, nontender, no hepatosplenomegaly or other masses  M/S:   Gait and station with WC mobility, Digits and nails at basleine, reduced muscle mass  SKIN:  Inspection and Palpation of skin and subcutaneous tissue very pale, not diaphoretic  PSYCH:  insight and judgement,  memory with severe impairment , affect and mood stuporous/hypoactive, does not follow commands readily         Recent Labs:    CBC RESULTS:   Recent Labs   Lab Test  09/01/17   0750  03/03/17   0806   WBC  4.6  4.8   RBC  3.71*  4.04   HGB  11.9  12.2   HCT  35.3  36.5   MCV  95  90   MCH  32.1  30.2   MCHC  33.7  33.4   RDW  12.5  12.4   PLT  124*  147*       Last Basic Metabolic Panel:  Recent Labs   Lab Test  11/20/17   0735  09/01/17   0750  07/31/17   0720   NA  142   --   141   POTASSIUM  4.4   --   4.3   CHLORIDE  105   --   103   ELIZABETH  8.6   --   8.5   CO2  29   --   31   BUN  28  25  29   CR  1.37*  1.33*  1.40*   GLC  156*  135*  193*     Lab Results   Component Value Date    A1C 7.6 07/31/2017    A1C 7.5 04/26/2017             Assessment/Plan:  Vascular dementia with behavior disturbance  Depression, unspecified depression type  Paranoia (H)  Anxiety  Visual hallucinations  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  Patient noting that he and his sister have also noticed this and want to not adjust her meds or GDR at this time as she is happy.    Noted to patient's son that the body is a fluid mechanism and may require less medication - he noted understanding but again declined GDR at this time.  Will monitor for ability to GDR in future.    Noted to nursing to please hold 1400 dose of Seroquel today (see below).     Type 2 diabetes mellitus with complication, with long-term current use of insulin (H)  Neuropathy involving both lower extremities  Patient hypoglycemic d/t administration of Novolog without po intake of breakfast.  Goal A1C 8-9 so will DC AM Novolog and monitor BG levels with accucheck. Will recheck A1C.   Patient's son on board with this plan as long as monitoring if being done.  Can assess TIDAC blood glucose levels and monitor need for  Novolog.   Patient given ample juice for short-acting relief today and food for long-acting support.    1400 seroquel held.   Nursing to monitor for continued lethargy and notify me asap.       Orders:  1.  A1C on 12/27/17.  Dx: DM2  2.  BG checks TID AC x 5 days.  Update NP w/results please.  Dx: DM2  3.  D/c breakfast Novolog.      Electronically signed by  LORI Ricks CNP

## 2017-12-27 ENCOUNTER — HOSPITAL LABORATORY (OUTPATIENT)
Dept: NURSING HOME | Facility: OTHER | Age: 82
End: 2017-12-27

## 2017-12-27 ENCOUNTER — NURSING HOME VISIT (OUTPATIENT)
Dept: GERIATRICS | Facility: CLINIC | Age: 82
End: 2017-12-27
Payer: COMMERCIAL

## 2017-12-27 VITALS
RESPIRATION RATE: 17 BRPM | BODY MASS INDEX: 46.15 KG/M2 | OXYGEN SATURATION: 96 % | DIASTOLIC BLOOD PRESSURE: 54 MMHG | TEMPERATURE: 97.5 F | WEIGHT: 191.4 LBS | SYSTOLIC BLOOD PRESSURE: 103 MMHG | HEART RATE: 81 BPM

## 2017-12-27 DIAGNOSIS — E11.8 TYPE 2 DIABETES MELLITUS WITH COMPLICATION, WITH LONG-TERM CURRENT USE OF INSULIN (H): Primary | ICD-10-CM

## 2017-12-27 DIAGNOSIS — Z79.4 TYPE 2 DIABETES MELLITUS WITH COMPLICATION, WITH LONG-TERM CURRENT USE OF INSULIN (H): Primary | ICD-10-CM

## 2017-12-27 LAB — HBA1C MFR BLD: 7.7 % (ref 4.3–6)

## 2017-12-27 PROCEDURE — 99308 SBSQ NF CARE LOW MDM 20: CPT | Performed by: NURSE PRACTITIONER

## 2017-12-27 NOTE — PROGRESS NOTES
[facility]    Chief Complaint   Patient presents with     Nursing Home Acute       HPI:    Jayne Vasquez is a 87 year old  (9/21/1930), who is being seen today for an episodic care visit at [facility].    HPI information obtained from: facility chart records, facility staff and [place] Epic chart review. Today's concern is:  Type 2 diabetes mellitus with complication, with long-term current use of insulin (H)  Patient on Lantus 18 units qAM.  12/22/ - Novolog 8 units with breakfast was DC'd.  Lab Results   Component Value Date    A1C 7.7 12/27/2017    A1C 7.6 07/31/2017    A1C 7.5 04/26/2017    A1C  01/25/2017     Canceled, Test credited   Duplicate request  CORRECTED ON 01/25 AT 0913: PREVIOUSLY REPORTED AS 7.4      A1C 7.3 01/06/2017     Since then, accuchecks:  AM: 208-294  Lunch: 160 x 1, 381-452  Dinner: 356-441      REVIEW OF SYSTEMS:  Unobtainable secondary to cognitive impairment    /54  Pulse 81  Temp 97.5  F (36.4  C)  Resp 17  Wt 191 lb 6.4 oz (86.8 kg)  SpO2 96%  BMI 46.15 kg/m2  GENERAL APPEARANCE:  Alert, in no distress,   RESP:  respiratory effort normal, no respiratory distress  ABDOMEN:  Nondistended, obese  M/S:   Gait and station with WC mobility, Digits and nails at baseline, reduced muscle mass  SKIN:  Inspection and Palpation of skin and subcutaneous tissue pale, intact  PSYCH:  insight and judgement, memory with severe impairment , affect and mood per baseline, does not consistently follow commands          ASSESSMENT/PLAN:  Type 2 diabetes mellitus with complication, with long-term current use of insulin (H)  Will increase Lantus and continue to monitor AM accuchecks.  Goal A1C 8-9%       Orders:  1.  Increase Lantus to 20 units sq Q am.  Dx: DM2  2.  Q am accuchecks.    Electronically signed by:  LORI Ricks CNP

## 2018-01-01 ENCOUNTER — MEDICAL CORRESPONDENCE (OUTPATIENT)
Dept: HEALTH INFORMATION MANAGEMENT | Facility: CLINIC | Age: 83
End: 2018-01-01

## 2018-01-01 ENCOUNTER — NURSING HOME VISIT (OUTPATIENT)
Dept: GERIATRICS | Facility: CLINIC | Age: 83
End: 2018-01-01
Payer: COMMERCIAL

## 2018-01-01 ENCOUNTER — HOSPITAL LABORATORY (OUTPATIENT)
Dept: NURSING HOME | Facility: OTHER | Age: 83
End: 2018-01-01

## 2018-01-01 VITALS
HEIGHT: 62 IN | OXYGEN SATURATION: 90 % | HEART RATE: 73 BPM | BODY MASS INDEX: 36.25 KG/M2 | SYSTOLIC BLOOD PRESSURE: 137 MMHG | TEMPERATURE: 95.2 F | RESPIRATION RATE: 18 BRPM | WEIGHT: 197 LBS | DIASTOLIC BLOOD PRESSURE: 77 MMHG

## 2018-01-01 VITALS
TEMPERATURE: 95 F | WEIGHT: 199.4 LBS | BODY MASS INDEX: 36.7 KG/M2 | HEART RATE: 90 BPM | RESPIRATION RATE: 18 BRPM | OXYGEN SATURATION: 90 % | SYSTOLIC BLOOD PRESSURE: 120 MMHG | HEIGHT: 62 IN | DIASTOLIC BLOOD PRESSURE: 75 MMHG

## 2018-01-01 VITALS
WEIGHT: 191.2 LBS | HEART RATE: 57 BPM | DIASTOLIC BLOOD PRESSURE: 62 MMHG | TEMPERATURE: 97.6 F | SYSTOLIC BLOOD PRESSURE: 136 MMHG | OXYGEN SATURATION: 97 % | RESPIRATION RATE: 18 BRPM | BODY MASS INDEX: 34.97 KG/M2

## 2018-01-01 VITALS
TEMPERATURE: 97.6 F | HEIGHT: 62 IN | RESPIRATION RATE: 18 BRPM | OXYGEN SATURATION: 95 % | SYSTOLIC BLOOD PRESSURE: 146 MMHG | WEIGHT: 183.8 LBS | HEART RATE: 64 BPM | DIASTOLIC BLOOD PRESSURE: 64 MMHG | BODY MASS INDEX: 33.82 KG/M2

## 2018-01-01 VITALS
BODY MASS INDEX: 33.82 KG/M2 | TEMPERATURE: 97.7 F | SYSTOLIC BLOOD PRESSURE: 148 MMHG | DIASTOLIC BLOOD PRESSURE: 85 MMHG | HEART RATE: 100 BPM | RESPIRATION RATE: 19 BRPM | OXYGEN SATURATION: 95 % | HEIGHT: 62 IN | WEIGHT: 183.8 LBS

## 2018-01-01 VITALS
BODY MASS INDEX: 35.59 KG/M2 | TEMPERATURE: 96.1 F | OXYGEN SATURATION: 88 % | SYSTOLIC BLOOD PRESSURE: 95 MMHG | HEART RATE: 64 BPM | RESPIRATION RATE: 18 BRPM | DIASTOLIC BLOOD PRESSURE: 48 MMHG | WEIGHT: 194.6 LBS

## 2018-01-01 VITALS
BODY MASS INDEX: 35.41 KG/M2 | WEIGHT: 192.4 LBS | OXYGEN SATURATION: 93 % | HEART RATE: 83 BPM | DIASTOLIC BLOOD PRESSURE: 70 MMHG | HEIGHT: 62 IN | TEMPERATURE: 97.3 F | RESPIRATION RATE: 18 BRPM | SYSTOLIC BLOOD PRESSURE: 109 MMHG

## 2018-01-01 VITALS
HEART RATE: 80 BPM | OXYGEN SATURATION: 90 % | SYSTOLIC BLOOD PRESSURE: 119 MMHG | DIASTOLIC BLOOD PRESSURE: 67 MMHG | WEIGHT: 194.8 LBS | RESPIRATION RATE: 18 BRPM | BODY MASS INDEX: 35.85 KG/M2 | TEMPERATURE: 94.4 F | HEIGHT: 62 IN

## 2018-01-01 VITALS
TEMPERATURE: 97.1 F | HEIGHT: 62 IN | SYSTOLIC BLOOD PRESSURE: 125 MMHG | RESPIRATION RATE: 14 BRPM | HEART RATE: 69 BPM | WEIGHT: 172.6 LBS | DIASTOLIC BLOOD PRESSURE: 71 MMHG | BODY MASS INDEX: 31.76 KG/M2 | OXYGEN SATURATION: 93 %

## 2018-01-01 VITALS
TEMPERATURE: 95.6 F | SYSTOLIC BLOOD PRESSURE: 121 MMHG | DIASTOLIC BLOOD PRESSURE: 79 MMHG | HEART RATE: 76 BPM | RESPIRATION RATE: 20 BRPM | WEIGHT: 188 LBS | BODY MASS INDEX: 34.6 KG/M2 | OXYGEN SATURATION: 93 % | HEIGHT: 62 IN

## 2018-01-01 VITALS
SYSTOLIC BLOOD PRESSURE: 157 MMHG | RESPIRATION RATE: 18 BRPM | TEMPERATURE: 96.6 F | BODY MASS INDEX: 35.12 KG/M2 | OXYGEN SATURATION: 94 % | WEIGHT: 192 LBS | DIASTOLIC BLOOD PRESSURE: 89 MMHG | HEART RATE: 78 BPM

## 2018-01-01 VITALS
HEART RATE: 93 BPM | WEIGHT: 202 LBS | RESPIRATION RATE: 16 BRPM | SYSTOLIC BLOOD PRESSURE: 125 MMHG | HEIGHT: 62 IN | BODY MASS INDEX: 37.17 KG/M2 | TEMPERATURE: 98.5 F | OXYGEN SATURATION: 93 % | DIASTOLIC BLOOD PRESSURE: 51 MMHG

## 2018-01-01 VITALS
BODY MASS INDEX: 36.67 KG/M2 | DIASTOLIC BLOOD PRESSURE: 56 MMHG | TEMPERATURE: 95.5 F | HEART RATE: 91 BPM | HEIGHT: 62 IN | OXYGEN SATURATION: 94 % | RESPIRATION RATE: 18 BRPM | SYSTOLIC BLOOD PRESSURE: 88 MMHG | WEIGHT: 199.3 LBS

## 2018-01-01 VITALS
HEART RATE: 84 BPM | WEIGHT: 188.8 LBS | TEMPERATURE: 96.9 F | OXYGEN SATURATION: 94 % | BODY MASS INDEX: 34.74 KG/M2 | DIASTOLIC BLOOD PRESSURE: 75 MMHG | SYSTOLIC BLOOD PRESSURE: 136 MMHG | HEIGHT: 62 IN | RESPIRATION RATE: 16 BRPM

## 2018-01-01 VITALS
HEART RATE: 98 BPM | BODY MASS INDEX: 36.77 KG/M2 | OXYGEN SATURATION: 91 % | WEIGHT: 199.8 LBS | TEMPERATURE: 96.7 F | RESPIRATION RATE: 16 BRPM | HEIGHT: 62 IN | SYSTOLIC BLOOD PRESSURE: 124 MMHG | DIASTOLIC BLOOD PRESSURE: 73 MMHG

## 2018-01-01 VITALS
BODY MASS INDEX: 33.62 KG/M2 | RESPIRATION RATE: 20 BRPM | TEMPERATURE: 97.5 F | HEART RATE: 82 BPM | DIASTOLIC BLOOD PRESSURE: 64 MMHG | OXYGEN SATURATION: 95 % | WEIGHT: 183.8 LBS | SYSTOLIC BLOOD PRESSURE: 117 MMHG

## 2018-01-01 VITALS
WEIGHT: 191.3 LBS | DIASTOLIC BLOOD PRESSURE: 61 MMHG | SYSTOLIC BLOOD PRESSURE: 134 MMHG | HEART RATE: 85 BPM | OXYGEN SATURATION: 91 % | TEMPERATURE: 97.9 F | BODY MASS INDEX: 35.2 KG/M2 | RESPIRATION RATE: 19 BRPM | HEIGHT: 62 IN

## 2018-01-01 VITALS
HEART RATE: 64 BPM | TEMPERATURE: 96.1 F | OXYGEN SATURATION: 93 % | BODY MASS INDEX: 35.12 KG/M2 | DIASTOLIC BLOOD PRESSURE: 48 MMHG | WEIGHT: 192 LBS | SYSTOLIC BLOOD PRESSURE: 95 MMHG | RESPIRATION RATE: 18 BRPM

## 2018-01-01 VITALS
DIASTOLIC BLOOD PRESSURE: 47 MMHG | RESPIRATION RATE: 16 BRPM | WEIGHT: 190.6 LBS | HEART RATE: 78 BPM | TEMPERATURE: 95.2 F | BODY MASS INDEX: 35.07 KG/M2 | HEIGHT: 62 IN | SYSTOLIC BLOOD PRESSURE: 133 MMHG | OXYGEN SATURATION: 94 %

## 2018-01-01 VITALS
SYSTOLIC BLOOD PRESSURE: 109 MMHG | HEIGHT: 62 IN | RESPIRATION RATE: 18 BRPM | WEIGHT: 192.4 LBS | DIASTOLIC BLOOD PRESSURE: 70 MMHG | TEMPERATURE: 97.3 F | HEART RATE: 83 BPM | OXYGEN SATURATION: 94 % | BODY MASS INDEX: 35.41 KG/M2

## 2018-01-01 DIAGNOSIS — I51.9 DIASTOLIC DYSFUNCTION, LEFT VENTRICLE: Primary | ICD-10-CM

## 2018-01-01 DIAGNOSIS — F32.1 MODERATE MAJOR DEPRESSION (H): ICD-10-CM

## 2018-01-01 DIAGNOSIS — R44.1 VISUAL HALLUCINATIONS: ICD-10-CM

## 2018-01-01 DIAGNOSIS — I13.0 HYPERTENSIVE HEART AND KIDNEY DISEASE WITH HF AND WITH CKD STAGE I-IV (H): ICD-10-CM

## 2018-01-01 DIAGNOSIS — D63.1 ANEMIA IN STAGE 3 CHRONIC KIDNEY DISEASE (H): ICD-10-CM

## 2018-01-01 DIAGNOSIS — I11.0 LVH (LEFT VENTRICULAR HYPERTROPHY) DUE TO HYPERTENSIVE DISEASE, WITH HEART FAILURE (H): ICD-10-CM

## 2018-01-01 DIAGNOSIS — I25.2 OLD MYOCARDIAL INFARCTION: ICD-10-CM

## 2018-01-01 DIAGNOSIS — E78.5 HYPERLIPIDEMIA LDL GOAL <100: ICD-10-CM

## 2018-01-01 DIAGNOSIS — F01.518 VASCULAR DEMENTIA WITH BEHAVIORAL DISTURBANCE (H): ICD-10-CM

## 2018-01-01 DIAGNOSIS — F06.0 PSYCHOTIC DISORDER WITH HALLUCINATIONS DUE TO KNOWN PHYSIOLOGICAL CONDITION: ICD-10-CM

## 2018-01-01 DIAGNOSIS — F22 DELUSIONAL DISORDER (H): ICD-10-CM

## 2018-01-01 DIAGNOSIS — F01.518 VASCULAR DEMENTIA WITH BEHAVIOR DISTURBANCE (H): Primary | ICD-10-CM

## 2018-01-01 DIAGNOSIS — I34.0 MITRAL VALVE INSUFFICIENCY, UNSPECIFIED ETIOLOGY: ICD-10-CM

## 2018-01-01 DIAGNOSIS — I35.0 AORTIC STENOSIS, MILD: ICD-10-CM

## 2018-01-01 DIAGNOSIS — I70.219 ATHEROSCLEROTIC PERIPHERAL VASCULAR DISEASE WITH INTERMITTENT CLAUDICATION (H): ICD-10-CM

## 2018-01-01 DIAGNOSIS — Z79.4 TYPE 2 DIABETES MELLITUS WITH COMPLICATION, WITH LONG-TERM CURRENT USE OF INSULIN (H): Primary | ICD-10-CM

## 2018-01-01 DIAGNOSIS — J44.9 CHRONIC OBSTRUCTIVE PULMONARY DISEASE, UNSPECIFIED COPD TYPE (H): ICD-10-CM

## 2018-01-01 DIAGNOSIS — F41.9 ANXIETY: ICD-10-CM

## 2018-01-01 DIAGNOSIS — E66.01 MORBID OBESITY (H): ICD-10-CM

## 2018-01-01 DIAGNOSIS — I25.10 CORONARY ARTERY DISEASE INVOLVING NATIVE CORONARY ARTERY OF NATIVE HEART WITHOUT ANGINA PECTORIS: ICD-10-CM

## 2018-01-01 DIAGNOSIS — E11.8 TYPE 2 DIABETES MELLITUS WITH COMPLICATION, WITH LONG-TERM CURRENT USE OF INSULIN (H): ICD-10-CM

## 2018-01-01 DIAGNOSIS — G47.01 INSOMNIA DUE TO MEDICAL CONDITION: ICD-10-CM

## 2018-01-01 DIAGNOSIS — E11.49 DIABETES MELLITUS TYPE 2 WITH NEUROLOGICAL MANIFESTATIONS (H): Primary | ICD-10-CM

## 2018-01-01 DIAGNOSIS — N18.30 CKD (CHRONIC KIDNEY DISEASE) STAGE 3, GFR 30-59 ML/MIN (H): ICD-10-CM

## 2018-01-01 DIAGNOSIS — N18.30 ANEMIA IN STAGE 3 CHRONIC KIDNEY DISEASE (H): ICD-10-CM

## 2018-01-01 DIAGNOSIS — E11.8 TYPE 2 DIABETES MELLITUS WITH COMPLICATION, WITH LONG-TERM CURRENT USE OF INSULIN (H): Primary | ICD-10-CM

## 2018-01-01 DIAGNOSIS — I51.9 DIASTOLIC DYSFUNCTION, LEFT VENTRICLE: ICD-10-CM

## 2018-01-01 DIAGNOSIS — I11.0 HYPERTENSIVE LEFT VENTRICULAR HYPERTROPHY WITH HEART FAILURE (H): ICD-10-CM

## 2018-01-01 DIAGNOSIS — I10 HTN, GOAL BELOW 140/90: ICD-10-CM

## 2018-01-01 DIAGNOSIS — F22 PARANOIA (H): ICD-10-CM

## 2018-01-01 DIAGNOSIS — F01.518 VASCULAR DEMENTIA WITH BEHAVIORAL DISTURBANCE (H): Primary | ICD-10-CM

## 2018-01-01 DIAGNOSIS — I61.2 NONTRAUMATIC HEMORRHAGE OF CEREBRAL HEMISPHERE, UNSPECIFIED LATERALITY (H): ICD-10-CM

## 2018-01-01 DIAGNOSIS — M79.674 PAIN OF TOE OF RIGHT FOOT: ICD-10-CM

## 2018-01-01 DIAGNOSIS — I27.20 PULMONARY HTN (H): ICD-10-CM

## 2018-01-01 DIAGNOSIS — R56.9 CONVULSIONS, UNSPECIFIED CONVULSION TYPE (H): ICD-10-CM

## 2018-01-01 DIAGNOSIS — F33.1 MODERATE EPISODE OF RECURRENT MAJOR DEPRESSIVE DISORDER (H): ICD-10-CM

## 2018-01-01 DIAGNOSIS — G89.29 OTHER CHRONIC PAIN: ICD-10-CM

## 2018-01-01 DIAGNOSIS — Z79.4 TYPE 2 DIABETES MELLITUS WITH COMPLICATION, WITH LONG-TERM CURRENT USE OF INSULIN (H): ICD-10-CM

## 2018-01-01 DIAGNOSIS — I95.1 ORTHOSTATIC HYPOTENSION: ICD-10-CM

## 2018-01-01 DIAGNOSIS — Z86.73 TRANSIENT ISCHEMIC ATTACK (TIA), AND CEREBRAL INFARCTION WITHOUT RESIDUAL DEFICITS(V12.54): ICD-10-CM

## 2018-01-01 DIAGNOSIS — G57.93 NEUROPATHY INVOLVING BOTH LOWER EXTREMITIES: ICD-10-CM

## 2018-01-01 DIAGNOSIS — I48.20 CHRONIC ATRIAL FIBRILLATION (H): ICD-10-CM

## 2018-01-01 DIAGNOSIS — K21.9 GASTROESOPHAGEAL REFLUX DISEASE, ESOPHAGITIS PRESENCE NOT SPECIFIED: ICD-10-CM

## 2018-01-01 DIAGNOSIS — R54 FRAIL ELDERLY: ICD-10-CM

## 2018-01-01 LAB
ALBUMIN UR-MCNC: 30 MG/DL
ANION GAP SERPL CALCULATED.3IONS-SCNC: 10 MMOL/L (ref 3–14)
ANION GAP SERPL CALCULATED.3IONS-SCNC: 5 MMOL/L (ref 3–14)
ANION GAP SERPL CALCULATED.3IONS-SCNC: 6 MMOL/L (ref 3–14)
ANION GAP SERPL CALCULATED.3IONS-SCNC: 7 MMOL/L (ref 3–14)
ANION GAP SERPL CALCULATED.3IONS-SCNC: 8 MMOL/L (ref 3–14)
ANION GAP SERPL CALCULATED.3IONS-SCNC: 9 MMOL/L (ref 3–14)
ANION GAP SERPL CALCULATED.3IONS-SCNC: 9 MMOL/L (ref 3–14)
APPEARANCE UR: CLEAR
BACTERIA SPEC CULT: NORMAL
BACTERIA SPEC CULT: NORMAL
BASOPHILS # BLD AUTO: 0 10E9/L (ref 0–0.2)
BASOPHILS NFR BLD AUTO: 0.4 %
BILIRUB UR QL STRIP: NEGATIVE
BUN SERPL-MCNC: 29 MG/DL (ref 7–30)
BUN SERPL-MCNC: 29 MG/DL (ref 7–30)
BUN SERPL-MCNC: 30 MG/DL (ref 7–30)
BUN SERPL-MCNC: 31 MG/DL (ref 7–30)
BUN SERPL-MCNC: 35 MG/DL (ref 7–30)
BUN SERPL-MCNC: 36 MG/DL (ref 7–30)
CALCIUM SERPL-MCNC: 8.1 MG/DL (ref 8.5–10.1)
CALCIUM SERPL-MCNC: 8.2 MG/DL (ref 8.5–10.1)
CALCIUM SERPL-MCNC: 8.4 MG/DL (ref 8.5–10.1)
CALCIUM SERPL-MCNC: 8.4 MG/DL (ref 8.5–10.1)
CALCIUM SERPL-MCNC: 8.5 MG/DL (ref 8.5–10.1)
CALCIUM SERPL-MCNC: 8.7 MG/DL (ref 8.5–10.1)
CALCIUM SERPL-MCNC: 8.8 MG/DL (ref 8.5–10.1)
CHLORIDE SERPL-SCNC: 102 MMOL/L (ref 94–109)
CHLORIDE SERPL-SCNC: 102 MMOL/L (ref 94–109)
CHLORIDE SERPL-SCNC: 103 MMOL/L (ref 94–109)
CHLORIDE SERPL-SCNC: 103 MMOL/L (ref 94–109)
CHLORIDE SERPL-SCNC: 104 MMOL/L (ref 94–109)
CHLORIDE SERPL-SCNC: 104 MMOL/L (ref 94–109)
CHLORIDE SERPL-SCNC: 106 MMOL/L (ref 94–109)
CO2 SERPL-SCNC: 28 MMOL/L (ref 20–32)
CO2 SERPL-SCNC: 29 MMOL/L (ref 20–32)
CO2 SERPL-SCNC: 30 MMOL/L (ref 20–32)
CO2 SERPL-SCNC: 33 MMOL/L (ref 20–32)
CO2 SERPL-SCNC: 35 MMOL/L (ref 20–32)
COLOR UR AUTO: YELLOW
CREAT SERPL-MCNC: 1.32 MG/DL (ref 0.52–1.04)
CREAT SERPL-MCNC: 1.33 MG/DL (ref 0.52–1.04)
CREAT SERPL-MCNC: 1.36 MG/DL (ref 0.52–1.04)
CREAT SERPL-MCNC: 1.47 MG/DL (ref 0.52–1.04)
CREAT SERPL-MCNC: 1.51 MG/DL (ref 0.52–1.04)
CREAT SERPL-MCNC: 1.53 MG/DL (ref 0.52–1.04)
CREAT SERPL-MCNC: 1.57 MG/DL (ref 0.52–1.04)
CREAT SERPL-MCNC: 1.62 MG/DL (ref 0.52–1.04)
DIFFERENTIAL METHOD BLD: ABNORMAL
EOSINOPHIL NFR BLD AUTO: 0.8 %
ERYTHROCYTE [DISTWIDTH] IN BLOOD BY AUTOMATED COUNT: 12.4 % (ref 10–15)
GFR SERPL CREATININE-BSD FRML MDRD: 28 ML/MIN/{1.73_M2}
GFR SERPL CREATININE-BSD FRML MDRD: 31 ML/MIN/1.7M2
GFR SERPL CREATININE-BSD FRML MDRD: 32 ML/MIN/1.7M2
GFR SERPL CREATININE-BSD FRML MDRD: 33 ML/MIN/1.7M2
GFR SERPL CREATININE-BSD FRML MDRD: 34 ML/MIN/1.7M2
GFR SERPL CREATININE-BSD FRML MDRD: 37 ML/MIN/1.7M2
GFR SERPL CREATININE-BSD FRML MDRD: 38 ML/MIN/1.7M2
GFR SERPL CREATININE-BSD FRML MDRD: 38 ML/MIN/1.7M2
GLUCOSE SERPL-MCNC: 107 MG/DL (ref 70–99)
GLUCOSE SERPL-MCNC: 124 MG/DL (ref 70–99)
GLUCOSE SERPL-MCNC: 129 MG/DL (ref 70–99)
GLUCOSE SERPL-MCNC: 136 MG/DL (ref 70–99)
GLUCOSE SERPL-MCNC: 168 MG/DL (ref 70–99)
GLUCOSE SERPL-MCNC: 173 MG/DL (ref 70–99)
GLUCOSE SERPL-MCNC: 208 MG/DL (ref 70–99)
GLUCOSE SERPL-MCNC: 96 MG/DL (ref 70–99)
GLUCOSE UR STRIP-MCNC: 150 MG/DL
HBA1C MFR BLD: 5.9 % (ref 0–5.6)
HBA1C MFR BLD: 7 % (ref 0–5.6)
HCT VFR BLD AUTO: 36.2 % (ref 35–47)
HGB BLD-MCNC: 10.2 G/DL (ref 11.7–15.7)
HGB BLD-MCNC: 12.1 G/DL (ref 11.7–15.7)
HGB UR QL STRIP: NEGATIVE
IMM GRANULOCYTES # BLD: 0.1 10E9/L (ref 0–0.4)
IMM GRANULOCYTES NFR BLD: 2.3 %
KETONES UR STRIP-MCNC: NEGATIVE MG/DL
LEUKOCYTE ESTERASE UR QL STRIP: NEGATIVE
LYMPHOCYTES # BLD AUTO: 2.1 10E9/L (ref 0.8–5.3)
LYMPHOCYTES NFR BLD AUTO: 40.6 %
Lab: NORMAL
Lab: NORMAL
MCH RBC QN AUTO: 30.9 PG (ref 26.5–33)
MCHC RBC AUTO-ENTMCNC: 33.4 G/DL (ref 31.5–36.5)
MCV RBC AUTO: 92 FL (ref 78–100)
MONOCYTES # BLD AUTO: 0.5 10E9/L (ref 0–1.3)
MONOCYTES NFR BLD AUTO: 10.3 %
MUCOUS THREADS #/AREA URNS LPF: PRESENT /LPF
NEUTROPHILS # BLD AUTO: 2.4 10E9/L (ref 1.6–8.3)
NEUTROPHILS NFR BLD AUTO: 45.6 %
NITRATE UR QL: NEGATIVE
NRBC # BLD AUTO: 0 10*3/UL
NRBC BLD AUTO-RTO: 0 /100
NT-PROBNP SERPL-MCNC: 7289 PG/ML (ref 0–450)
PH UR STRIP: 5 PH (ref 5–7)
PLATELET # BLD AUTO: 130 10E9/L (ref 150–450)
POTASSIUM SERPL-SCNC: 4.4 MMOL/L (ref 3.4–5.3)
POTASSIUM SERPL-SCNC: 4.6 MMOL/L (ref 3.4–5.3)
POTASSIUM SERPL-SCNC: 4.7 MMOL/L (ref 3.4–5.3)
POTASSIUM SERPL-SCNC: 4.8 MMOL/L (ref 3.4–5.3)
POTASSIUM SERPL-SCNC: 4.8 MMOL/L (ref 3.4–5.3)
POTASSIUM SERPL-SCNC: 4.9 MMOL/L (ref 3.4–5.3)
POTASSIUM SERPL-SCNC: 5 MMOL/L (ref 3.4–5.3)
RBC # BLD AUTO: 3.92 10E12/L (ref 3.8–5.2)
RBC #/AREA URNS AUTO: 0 /HPF (ref 0–2)
SODIUM SERPL-SCNC: 140 MMOL/L (ref 133–144)
SODIUM SERPL-SCNC: 141 MMOL/L (ref 133–144)
SODIUM SERPL-SCNC: 142 MMOL/L (ref 133–144)
SODIUM SERPL-SCNC: 143 MMOL/L (ref 133–144)
SOURCE: ABNORMAL
SP GR UR STRIP: 1.01 (ref 1–1.03)
SPECIMEN SOURCE: NORMAL
SPECIMEN SOURCE: NORMAL
SQUAMOUS #/AREA URNS AUTO: <1 /HPF (ref 0–1)
UROBILINOGEN UR STRIP-MCNC: 0 MG/DL (ref 0–2)
WBC # BLD AUTO: 5.2 10E9/L (ref 4–11)
WBC #/AREA URNS AUTO: 1 /HPF (ref 0–5)

## 2018-01-01 PROCEDURE — 99310 SBSQ NF CARE HIGH MDM 45: CPT | Performed by: FAMILY MEDICINE

## 2018-01-01 PROCEDURE — 99309 SBSQ NF CARE MODERATE MDM 30: CPT | Performed by: NURSE PRACTITIONER

## 2018-01-01 PROCEDURE — 99308 SBSQ NF CARE LOW MDM 20: CPT | Performed by: NURSE PRACTITIONER

## 2018-01-01 PROCEDURE — 99318 ZZC ANNUAL NURSING FAC ASSESSMNT, STABLE: CPT | Performed by: NURSE PRACTITIONER

## 2018-01-01 PROCEDURE — 99309 SBSQ NF CARE MODERATE MDM 30: CPT | Performed by: FAMILY MEDICINE

## 2018-01-01 RX ORDER — BISACODYL 10 MG
10 SUPPOSITORY, RECTAL RECTAL DAILY PRN
COMMUNITY
Start: 2018-01-01 | End: 2018-01-01

## 2018-01-01 ASSESSMENT — MIFFLIN-ST. JEOR: SCORE: 1299.52

## 2018-01-10 ENCOUNTER — HOSPITAL LABORATORY (OUTPATIENT)
Dept: NURSING HOME | Facility: OTHER | Age: 83
End: 2018-01-10

## 2018-01-10 VITALS
DIASTOLIC BLOOD PRESSURE: 64 MMHG | SYSTOLIC BLOOD PRESSURE: 128 MMHG | OXYGEN SATURATION: 95 % | TEMPERATURE: 99.1 F | HEART RATE: 76 BPM | RESPIRATION RATE: 20 BRPM

## 2018-01-10 LAB
ALBUMIN UR-MCNC: 100 MG/DL
APPEARANCE UR: ABNORMAL
BILIRUB UR QL STRIP: NEGATIVE
COLOR UR AUTO: YELLOW
FLUAV+FLUBV AG SPEC QL: NEGATIVE
FLUAV+FLUBV AG SPEC QL: POSITIVE
GLUCOSE UR STRIP-MCNC: >499 MG/DL
HGB UR QL STRIP: NEGATIVE
KETONES UR STRIP-MCNC: NEGATIVE MG/DL
LEUKOCYTE ESTERASE UR QL STRIP: NEGATIVE
MUCOUS THREADS #/AREA URNS LPF: PRESENT /LPF
NITRATE UR QL: NEGATIVE
PH UR STRIP: 5 PH (ref 5–7)
RBC #/AREA URNS AUTO: 3 /HPF (ref 0–2)
SOURCE: ABNORMAL
SP GR UR STRIP: 1.02 (ref 1–1.03)
SPECIMEN SOURCE: ABNORMAL
SQUAMOUS #/AREA URNS AUTO: 1 /HPF (ref 0–1)
UROBILINOGEN UR STRIP-MCNC: 0 MG/DL (ref 0–2)
WBC #/AREA URNS AUTO: 2 /HPF (ref 0–2)

## 2018-01-10 NOTE — PROGRESS NOTES
[facility]    Chief Complaint   Patient presents with     snf Regulatory       HPI:    Jayne Vasquez is a 87 year old  (9/21/1930), who is being seen today for a federally mandated E/M visit at [facility] .  HPI information obtained from: facility chart records, facility staff, and [place] Epic chart review.     Today's concerns are:  - Resident seen and examined.  He reports that patient had been having low-grade fevers, increased blood glucose, increased confusion-behaviors.  UA was checked and back negative culture as well.  Chest x-ray was done which showed possible infiltrate consistent with CHF exacerbation.  Patient was given 1 g of Rocephin ×1 and started on sliding-scale insulin and bolus last night due to hyperglycemia. Tested positive for influenza started on  Tamiflu meds on Jan 10th. she     ===============================   - Past Medical, social, family histories, medications, and allergies reviewed and updated  - Medications reviewed: in the chart and EHR.   - Case Management:   I have reviewed the care plan and MDS and do agree with the plan. Patient's desire to return to the community is not present.  Information reviewed:  Medications, vital signs, orders, and nursing notes.    MEDICATIONS:  Current Outpatient Prescriptions   Medication Sig Dispense Refill     sorbitol 70 % SOLN solution Take 30 mLs by mouth daily as needed for constipation       GABAPENTIN PO Take 600 mg by mouth every morning Also 900 QHS       BusPIRone HCl (BUSPAR PO) Take 5 mg by mouth 3 times daily        trolamine salicylate (ASPERCREME) 10 % cream Apply topically as needed for moderate pain (also BID)        hydrocortisone (ANUSOL-HC) 2.5 % rectal cream Place rectally 3 times daily as needed        hypromellose (ARTIFICIAL TEARS) 0.5 % SOLN 1 drop 3 times daily as needed for dry eyes       nystatin (MYCOSTATIN) 923965 UNIT/GM POWD        Cranberry-Vitamin C-Inulin  (UTI-STAT PO) Take by mouth daily       QUEtiapine Fumarate (SEROQUEL PO) Take 25 mg by mouth 3 times daily        Carvedilol (COREG PO) Take 25 mg by mouth 2 times daily (with meals)       Venlafaxine HCl (EFFEXOR XR PO) Take 150 mg by mouth At Bedtime        Acetaminophen (TYLENOL PO) Take 650 mg by mouth 2 times daily Also TID PRN       ASPIRIN PO Take 81 mg by mouth daily       LISINOPRIL PO Take 40 mg by mouth daily        fluticasone (FLONASE) 50 MCG/ACT nasal spray Spray 1 spray into both nostrils At Bedtime       insulin glargine (LANTUS) 100 UNIT/ML PEN Inject 20 Units Subcutaneous every morning        sennosides (SENOKOT) 8.6 MG tablet Take 2 tablets by mouth 2 times daily        polyethylene glycol (MIRALAX/GLYCOLAX) powder Take 17 g by mouth daily        alum & mag hydroxide-simethicone (MAALOX ADVANCED MAX ST) 400-400-40 MG/5ML SUSP Take 30 mLs by mouth every 4 hours as needed PRN for for stomach distress  0     nitroglycerin (NITROSTAT) 0.4 MG SL tablet Place 1 tablet (0.4 mg) under the tongue every 5 minutes as needed 25 tablet      levETIRAcetam (KEPPRA) 100 MG/ML solution Take 7.5 mLs (750 mg) by mouth every 12 hours       traZODone (DESYREL) 50 MG tablet Take 1 tablet (50 mg) by mouth At Bedtime 31 tablet 12     amLODIPine (NORVASC) 10 MG tablet Take 1 tablet (10 mg) by mouth daily 90 tablet 3     ROS:  Unobtainable secondary to cognitive impairment or aphasia.    Exam:  Vitals: /64  Pulse 76  Temp 99.1  F (37.3  C)  Resp 20  SpO2 95%  BMI= There is no height or weight on file to calculate BMI.  GENERAL APPEARANCE:  tired looking   ENT:  Mouth and posterior oropharynx normal, moist mucous membranes  EYES:  EOM, conjunctivae, lids, pupils and irises normal, Left ptosis. mild  RESP:  respiratory effort and palpation of chest normal, congested lung sound on the anterior surface, posterior surface was not examined due to Resident's status. Some wheezing.   CV:  Palpation and auscultation of  heart done , regular rate and rhythm, Systolic murmur, rub, or gallop, no pedal edema  ABDOMEN:  normal bowel sounds, soft, nontender, no hepatosplenomegaly or other masses  M/S:   Ambulate using the wheelchair. Self propel  SKIN:  warm. Inspection of skin and subcutaneous tissue baseline, Palpation of skin and subcutaneous tissue baseline  NEURO:   No neuro focal deficit noted  PSYCH:  confused, tired looking.      Lab/Diagnostic data:     Last Basic Metabolic Panel:  Recent Labs   Lab Test  11/20/17   0735  09/01/17   0750  07/31/17   0720   NA  142   --   141   POTASSIUM  4.4   --   4.3   CHLORIDE  105   --   103   ELIZABETH  8.6   --   8.5   CO2  29   --   31   BUN  28  25  29   CR  1.37*  1.33*  1.40*   GLC  156*  135*  193*     ASSESSMENT/PLAN  Influenza with respiratory manifestation other than pneumonia  - started on Tamiflu, continue.   - Will add Duoneb tid x 3 days.     Type 2 diabetes mellitus with complication, with long-term current use of insulin (H)   Lab Results   Component Value Date    A1C 7.7 12/27/2017    A1C 7.6 07/31/2017    A1C 7.5 04/26/2017   -  on lantus  20 units- recently increased due to increased hyperglycemia, likely 2/2 to current infection. .   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     Hx of Nontraumatic hemorrhage of cerebral hemisphere, unspecified laterality (H)  - Late effects from fall resulting in hemorrhage of cerebral hemisphere in the past.   - Pt is a WC self propel with feet. ADLs dependant.    - Stable     CKD:  - stage 3  - - Avoid nephrotoxic drugs  - Renal dose the medications.     CAD, hx of MI:  - stable. Continue meds.     Frail elderly  - Significant  Deficits requiring NH placement. Requiring extensive  assistance from nursing. Up for meals only o/w spends the day resting in bed    Vascular dementia with behavior disturbance  - BIMS 0/15, c/w severe dementia  - Continue to anticipate needs. Chronic condition, ongoing decline expected.   -  Continue to provide redirection and reassurance as needed. Maintain safe living situation with goals focused on comfort.    Orders:  - See above, otherwise, continue the rest of the current POC.     Electronically signed by:  Jesse Gonzalez MD

## 2018-01-11 ENCOUNTER — NURSING HOME VISIT (OUTPATIENT)
Dept: GERIATRICS | Facility: CLINIC | Age: 83
End: 2018-01-11
Payer: COMMERCIAL

## 2018-01-11 DIAGNOSIS — F06.0 PSYCHOTIC DISORDER WITH HALLUCINATIONS DUE TO KNOWN PHYSIOLOGICAL CONDITION: ICD-10-CM

## 2018-01-11 DIAGNOSIS — N18.30 CKD (CHRONIC KIDNEY DISEASE) STAGE 3, GFR 30-59 ML/MIN (H): ICD-10-CM

## 2018-01-11 DIAGNOSIS — R54 FRAIL ELDERLY: ICD-10-CM

## 2018-01-11 DIAGNOSIS — Z79.4 TYPE 2 DIABETES MELLITUS WITH COMPLICATION, WITH LONG-TERM CURRENT USE OF INSULIN (H): ICD-10-CM

## 2018-01-11 DIAGNOSIS — E11.8 TYPE 2 DIABETES MELLITUS WITH COMPLICATION, WITH LONG-TERM CURRENT USE OF INSULIN (H): ICD-10-CM

## 2018-01-11 DIAGNOSIS — I61.2 NONTRAUMATIC HEMORRHAGE OF CEREBRAL HEMISPHERE, UNSPECIFIED LATERALITY (H): ICD-10-CM

## 2018-01-11 DIAGNOSIS — F01.518 VASCULAR DEMENTIA WITH BEHAVIOR DISTURBANCE (H): ICD-10-CM

## 2018-01-11 DIAGNOSIS — I25.10 CORONARY ARTERY DISEASE INVOLVING NATIVE CORONARY ARTERY OF NATIVE HEART WITHOUT ANGINA PECTORIS: ICD-10-CM

## 2018-01-11 DIAGNOSIS — J11.1 INFLUENZA WITH RESPIRATORY MANIFESTATION OTHER THAN PNEUMONIA: Primary | ICD-10-CM

## 2018-01-11 PROCEDURE — 99310 SBSQ NF CARE HIGH MDM 45: CPT | Performed by: FAMILY MEDICINE

## 2018-01-12 LAB
BACTERIA SPEC CULT: NORMAL
Lab: NORMAL
SPECIMEN SOURCE: NORMAL

## 2018-01-26 ENCOUNTER — NURSING HOME VISIT (OUTPATIENT)
Dept: GERIATRICS | Facility: CLINIC | Age: 83
End: 2018-01-26
Payer: COMMERCIAL

## 2018-01-26 VITALS
RESPIRATION RATE: 18 BRPM | SYSTOLIC BLOOD PRESSURE: 123 MMHG | TEMPERATURE: 96.6 F | DIASTOLIC BLOOD PRESSURE: 73 MMHG | HEART RATE: 87 BPM | OXYGEN SATURATION: 91 % | WEIGHT: 187 LBS | BODY MASS INDEX: 45.09 KG/M2

## 2018-01-26 DIAGNOSIS — E11.8 TYPE 2 DIABETES MELLITUS WITH COMPLICATION, WITH LONG-TERM CURRENT USE OF INSULIN (H): Primary | ICD-10-CM

## 2018-01-26 DIAGNOSIS — Z79.4 TYPE 2 DIABETES MELLITUS WITH COMPLICATION, WITH LONG-TERM CURRENT USE OF INSULIN (H): Primary | ICD-10-CM

## 2018-01-26 DIAGNOSIS — F01.518 VASCULAR DEMENTIA WITH BEHAVIOR DISTURBANCE (H): ICD-10-CM

## 2018-01-26 DIAGNOSIS — R44.1 VISUAL HALLUCINATIONS: ICD-10-CM

## 2018-01-26 DIAGNOSIS — F22: ICD-10-CM

## 2018-01-26 DIAGNOSIS — F06.0 PSYCHOTIC DISORDER WITH HALLUCINATIONS DUE TO KNOWN PHYSIOLOGICAL CONDITION: ICD-10-CM

## 2018-01-26 DIAGNOSIS — F22 PARANOIA (H): ICD-10-CM

## 2018-01-26 DIAGNOSIS — F32.A DEPRESSION, UNSPECIFIED DEPRESSION TYPE: ICD-10-CM

## 2018-01-26 PROCEDURE — 99309 SBSQ NF CARE MODERATE MDM 30: CPT | Performed by: NURSE PRACTITIONER

## 2018-01-26 RX ORDER — GLYCERIN/DIMETH/SURFACTANTS
LOTION (ML) TOPICAL DAILY
Status: ON HOLD | COMMUNITY
End: 2019-01-01

## 2018-01-26 NOTE — PROGRESS NOTES
[facility]    Chief Complaint   Patient presents with     Nursing Home Acute       HPI:    Jayne Vasquez is a 87 year old  (9/21/1930), who is being seen today for an episodic care visit at [facility].    HPI information obtained from: facility chart records, facility staff and [facility] chart review. Today's concern is:  Type 2 diabetes mellitus with complication, with long-term current use of insulin (H)  Patient recently was diagnosed with Influenza.  At that time she was having hyperglycemia as a reaction to the virus.  Lantus was increased and SSI was initiated.  Lantus 20->24 units qAM.     Patient now have hypoglycemia at times.      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  Nursing aid noted it started this AM when patient had a large soft/liquid BM which patient believed was blood.    She has severe cognitive impairment and often has delusions and hallucinations of men in her room, attempting to harm her.    She is very weepy and agitated when by herself/nursing.  When attempting to assist her into the dining room for breakfast/lunch, he became physically agitated with other residents; at which time nursing segregated her and again are 1:1 her.      Patient On buspar 5 mg TID, Effexor  qHS, levetiracetam 750 mg BID (11/20/17 level: 33), seroquel 25 mg TID, Trazodone 50 mg qHS  Also on gabapentin 600 mg qAM and 900 mg qHS or neuropathy      REVIEW OF SYSTEMS:  Unobtainable secondary to cognitive  impairment or aphasia.    Past medical and surgical history reviewed.     /73  Pulse 87  Temp 96.6  F (35.9  C)  Resp 18  Wt 187 lb (84.8 kg)  SpO2 91%  BMI 45.09 kg/m2  GENERAL APPEARANCE:  Alert, in moderate (to severe at times) agitation, weepy/crying, inconsolable at times.   RESP:  respiratory effort normal, no respiratory distress  CV:  Mild LE peripheral edema  ABDOMEN:  nondistended  M/S:   Gait and station with WC mobility, Digits and nails at baseline, reduced muscle mass  SKIN:  Inspection and Palpation of skin and subcutaneous tissue intact, pale  PSYCH:  insight and judgement, memory with severe impairment, affect and mood agitated/inconsolable at times, does not follow commands readily       ASSESSMENT/PLAN:  Type 2 diabetes mellitus with complication, with long-term current use of insulin (H)  Now that acute Influenza illness is subsiding, will return Lantus to original dosing and continue to monitor with SSI.    Updated family who agrees.     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    Will give 1400 dose early and add afternoon dose of Seroquel for agitation.   Will monitor with nursing.     Orders:   1.  Decrease Lantus to 20 units sq QD.  Dx: DM2  2.  Notify NP for BG < 70.  3. Give 1400 Seroquel now/early (12:20)  4. Seroquel 25 mg po today at 1500.    Electronically signed by:  LORI Ricks CNP

## 2018-02-05 ENCOUNTER — NURSING HOME VISIT (OUTPATIENT)
Dept: GERIATRICS | Facility: CLINIC | Age: 83
End: 2018-02-05
Payer: COMMERCIAL

## 2018-02-05 VITALS
BODY MASS INDEX: 43.4 KG/M2 | RESPIRATION RATE: 18 BRPM | TEMPERATURE: 97.6 F | WEIGHT: 180 LBS | HEART RATE: 84 BPM | DIASTOLIC BLOOD PRESSURE: 57 MMHG | SYSTOLIC BLOOD PRESSURE: 138 MMHG | OXYGEN SATURATION: 92 %

## 2018-02-05 DIAGNOSIS — E11.8 TYPE 2 DIABETES MELLITUS WITH COMPLICATION, WITH LONG-TERM CURRENT USE OF INSULIN (H): Primary | ICD-10-CM

## 2018-02-05 DIAGNOSIS — Z79.4 TYPE 2 DIABETES MELLITUS WITH COMPLICATION, WITH LONG-TERM CURRENT USE OF INSULIN (H): Primary | ICD-10-CM

## 2018-02-05 DIAGNOSIS — F01.518 VASCULAR DEMENTIA WITH BEHAVIOR DISTURBANCE (H): ICD-10-CM

## 2018-02-05 PROCEDURE — 99308 SBSQ NF CARE LOW MDM 20: CPT | Performed by: NURSE PRACTITIONER

## 2018-02-05 NOTE — PROGRESS NOTES
[facility]    Chief Complaint   Patient presents with     Nursing Home Acute       HPI:    Jayne Vasquez is a 87 year old  (9/21/1930), who is being seen today for an episodic care visit at [facility].    HPI information obtained from: facility chart records, facility staff and [place] Epic chart review. Today's concern is:     Type 2 diabetes mellitus with complication, with long-term current use of insulin (H)  Vascular dementia with behavior disturbance     Patient with advanced dementia so is a poor historian about intake.    Patient previously on Lantus 20 units daily, then it was increased when she was diagnosed with Influenza, then decreased back to previous dosing d/t hypoglycemia.    Nursing alerting to continued hypoglycemia:  AMS:  (no SSI used)  Lunch:  (0-4 units SSI used)  Dinner:  (0-8 SSI)    Lab Results   Component Value Date    A1C 7.7 12/27/2017    A1C 7.6 07/31/2017    A1C 7.5 04/26/2017    A1C  01/25/2017     Canceled, Test credited   Duplicate request  CORRECTED ON 01/25 AT 0913: PREVIOUSLY REPORTED AS 7.4      A1C 7.3 01/06/2017         REVIEW OF SYSTEMS:  Unobtainable secondary to cognitive impairment or aphasia.    /57  Pulse 84  Temp 97.6  F (36.4  C)  Resp 18  Wt 180 lb (81.6 kg)  SpO2 92%  BMI 43.4 kg/m2  GENERAL APPEARANCE:  Alert, in no distress, confused per baseline  RESP:  respiratory effort normal, no respiratory distress  ABDOMEN:  Nondistended, slightly obese,   M/S:   Gait and station with WC mobility, Digits and nails with arthritic changes, reeced muscle mass  SKIN:  Inspection and Palpation of skin and subcutaneous tissue pale, dry, thin  PSYCH:  insight and judgement, memory with severe impairment, affect and mood per baseline, does not follows commands readily           ASSESSMENT/PLAN:     Type 2 diabetes mellitus with complication, with long-term current use of insulin (H)  Vascular dementia with behavior disturbance      Will continue to decrease Lantus as goal BG >150. Nursing already updating NP to blood glucose level <70. Will continue to ask for close monitoring.     Orders:   1.  Decrease Lantus to 16 units sq Q am.  Dx: DM2  2.  Notify NP in 1 week re: BG levels.  Dx: DM2    Electronically signed by:  LORI Ricks CNP

## 2018-03-02 ENCOUNTER — HOSPITAL LABORATORY (OUTPATIENT)
Dept: NURSING HOME | Facility: OTHER | Age: 83
End: 2018-03-02

## 2018-03-02 LAB
BUN SERPL-MCNC: 31 MG/DL (ref 7–30)
CREAT SERPL-MCNC: 1.36 MG/DL (ref 0.52–1.04)
ERYTHROCYTE [DISTWIDTH] IN BLOOD BY AUTOMATED COUNT: 12.6 % (ref 10–15)
GFR SERPL CREATININE-BSD FRML MDRD: 37 ML/MIN/1.7M2
HCT VFR BLD AUTO: 37.2 % (ref 35–47)
HGB BLD-MCNC: 11.8 G/DL (ref 11.7–15.7)
MCH RBC QN AUTO: 29.9 PG (ref 26.5–33)
MCHC RBC AUTO-ENTMCNC: 31.7 G/DL (ref 31.5–36.5)
MCV RBC AUTO: 94 FL (ref 78–100)
PLATELET # BLD AUTO: 142 10E9/L (ref 150–450)
RBC # BLD AUTO: 3.95 10E12/L (ref 3.8–5.2)
WBC # BLD AUTO: 5.5 10E9/L (ref 4–11)

## 2018-03-15 ENCOUNTER — NURSING HOME VISIT (OUTPATIENT)
Dept: GERIATRICS | Facility: CLINIC | Age: 83
End: 2018-03-15
Payer: COMMERCIAL

## 2018-03-15 VITALS
TEMPERATURE: 96.5 F | DIASTOLIC BLOOD PRESSURE: 78 MMHG | BODY MASS INDEX: 44.36 KG/M2 | WEIGHT: 184 LBS | HEART RATE: 78 BPM | OXYGEN SATURATION: 94 % | SYSTOLIC BLOOD PRESSURE: 132 MMHG | RESPIRATION RATE: 18 BRPM

## 2018-03-15 DIAGNOSIS — M79.674 PAIN OF TOE OF RIGHT FOOT: Primary | ICD-10-CM

## 2018-03-15 DIAGNOSIS — F01.518 VASCULAR DEMENTIA WITH BEHAVIOR DISTURBANCE (H): ICD-10-CM

## 2018-03-15 PROCEDURE — 99308 SBSQ NF CARE LOW MDM 20: CPT | Performed by: NURSE PRACTITIONER

## 2018-03-15 RX ORDER — MUPIROCIN 20 MG/G
OINTMENT TOPICAL 2 TIMES DAILY PRN
COMMUNITY
End: 2018-01-01

## 2018-03-15 NOTE — PROGRESS NOTES
[facility]    Chief Complaint   Patient presents with     Nursing Home Acute       HPI:    Jayne Vasquez is a 87 year old  (9/21/1930), who is being seen today for an episodic care visit at [facility].    HPI information obtained from: facility chart records, facility staff and patient report. Today's concern is:     Pain of toe of right foot  Vascular dementia with behavior disturbance     Nursing reporting patient has redness to right great toe, possibly ingrown toenail but nursing having trouble with patient allowing examination.      Patient today allowing examination from this provider.  Patient reporting pain with sock manipulation and light palpation to distal end of right great toe.     REVIEW OF SYSTEMS:  Unobtainable secondary to cognitive impairment.     /78  Pulse 78  Temp 96.5  F (35.8  C)  Resp 18  Wt 184 lb (83.5 kg)  SpO2 94%  BMI 44.36 kg/m2  GENERAL APPEARANCE:  Alert, in no distress  NAILS/SKIN:  Right great toe with increased redness and scant swelling. No overt s/s of infection at this time just more looking irritated/painful d/t ingrown toenail.     ASSESSMENT/PLAN:     Pain of toe of right foot  Vascular dementia with behavior disturbance     Ingrown toenail on right great toe.  Will monitor at this time as seemingly not infected.  Will keep moist with Bactroban ointment and dressing (band-aid vs gauze at nursing preference).  Will ask for patient to be added to next Podiatry visit.  If this is seemingly not in the near future, can try to trim nail to allow for relief. Nursing will find out when Podiatry is next scheduled.     Orders:   1.  Bactroban ointment to right great toe BID & BID PRN (ok to d/c scheduled when healed), then cover with dressing.  Dx: ingrown toenail.  2.  Please have pt added to Podiatrist list for next visit.    Electronically signed by:  LORI Ricks CNP

## 2018-03-22 ENCOUNTER — NURSING HOME VISIT (OUTPATIENT)
Dept: GERIATRICS | Facility: CLINIC | Age: 83
End: 2018-03-22
Payer: COMMERCIAL

## 2018-03-22 VITALS
HEIGHT: 62 IN | OXYGEN SATURATION: 94 % | DIASTOLIC BLOOD PRESSURE: 57 MMHG | WEIGHT: 184 LBS | SYSTOLIC BLOOD PRESSURE: 90 MMHG | BODY MASS INDEX: 33.86 KG/M2 | TEMPERATURE: 95 F | RESPIRATION RATE: 20 BRPM | HEART RATE: 80 BPM

## 2018-03-22 DIAGNOSIS — N18.30 ANEMIA IN STAGE 3 CHRONIC KIDNEY DISEASE (H): ICD-10-CM

## 2018-03-22 DIAGNOSIS — D63.1 ANEMIA IN STAGE 3 CHRONIC KIDNEY DISEASE (H): ICD-10-CM

## 2018-03-22 DIAGNOSIS — I48.20 CHRONIC ATRIAL FIBRILLATION (H): ICD-10-CM

## 2018-03-22 DIAGNOSIS — F01.518 VASCULAR DEMENTIA WITH BEHAVIOR DISTURBANCE (H): ICD-10-CM

## 2018-03-22 DIAGNOSIS — F06.0 PSYCHOTIC DISORDER WITH HALLUCINATIONS DUE TO KNOWN PHYSIOLOGICAL CONDITION: ICD-10-CM

## 2018-03-22 DIAGNOSIS — G57.93 NEUROPATHY INVOLVING BOTH LOWER EXTREMITIES: ICD-10-CM

## 2018-03-22 DIAGNOSIS — F22 PARANOIA (H): ICD-10-CM

## 2018-03-22 DIAGNOSIS — I34.0 MITRAL VALVE INSUFFICIENCY, UNSPECIFIED ETIOLOGY: ICD-10-CM

## 2018-03-22 DIAGNOSIS — R56.9 CONVULSIONS, UNSPECIFIED CONVULSION TYPE (H): ICD-10-CM

## 2018-03-22 DIAGNOSIS — G47.01 INSOMNIA DUE TO MEDICAL CONDITION: ICD-10-CM

## 2018-03-22 DIAGNOSIS — R44.1 VISUAL HALLUCINATIONS: ICD-10-CM

## 2018-03-22 DIAGNOSIS — N18.30 CKD (CHRONIC KIDNEY DISEASE) STAGE 3, GFR 30-59 ML/MIN (H): ICD-10-CM

## 2018-03-22 DIAGNOSIS — F22: ICD-10-CM

## 2018-03-22 DIAGNOSIS — E11.8 TYPE 2 DIABETES MELLITUS WITH COMPLICATION, WITH LONG-TERM CURRENT USE OF INSULIN (H): ICD-10-CM

## 2018-03-22 DIAGNOSIS — I61.2 NONTRAUMATIC HEMORRHAGE OF CEREBRAL HEMISPHERE, UNSPECIFIED LATERALITY (H): Primary | ICD-10-CM

## 2018-03-22 DIAGNOSIS — G89.29 OTHER CHRONIC PAIN: ICD-10-CM

## 2018-03-22 DIAGNOSIS — F32.A DEPRESSION, UNSPECIFIED DEPRESSION TYPE: ICD-10-CM

## 2018-03-22 DIAGNOSIS — J44.9 CHRONIC OBSTRUCTIVE PULMONARY DISEASE, UNSPECIFIED COPD TYPE (H): ICD-10-CM

## 2018-03-22 DIAGNOSIS — I13.0 HYPERTENSIVE HEART AND KIDNEY DISEASE WITH HF AND WITH CKD STAGE I-IV (H): ICD-10-CM

## 2018-03-22 DIAGNOSIS — Z79.4 TYPE 2 DIABETES MELLITUS WITH COMPLICATION, WITH LONG-TERM CURRENT USE OF INSULIN (H): ICD-10-CM

## 2018-03-22 DIAGNOSIS — I25.2 OLD MYOCARDIAL INFARCTION: ICD-10-CM

## 2018-03-22 DIAGNOSIS — I11.0 LVH (LEFT VENTRICULAR HYPERTROPHY) DUE TO HYPERTENSIVE DISEASE, WITH HEART FAILURE (H): ICD-10-CM

## 2018-03-22 DIAGNOSIS — I25.10 CORONARY ARTERY DISEASE INVOLVING NATIVE CORONARY ARTERY OF NATIVE HEART WITHOUT ANGINA PECTORIS: ICD-10-CM

## 2018-03-22 DIAGNOSIS — F41.9 ANXIETY: ICD-10-CM

## 2018-03-22 DIAGNOSIS — I35.0 AORTIC STENOSIS, MILD: ICD-10-CM

## 2018-03-22 DIAGNOSIS — I51.9 DIASTOLIC DYSFUNCTION, LEFT VENTRICLE: ICD-10-CM

## 2018-03-22 DIAGNOSIS — K21.9 GASTROESOPHAGEAL REFLUX DISEASE, ESOPHAGITIS PRESENCE NOT SPECIFIED: ICD-10-CM

## 2018-03-22 DIAGNOSIS — D69.6 THROMBOCYTOPENIA (H): ICD-10-CM

## 2018-03-22 PROCEDURE — 99318 ZZC ANNUAL NURSING FAC ASSESSMNT, STABLE: CPT | Performed by: NURSE PRACTITIONER

## 2018-03-22 NOTE — PROGRESS NOTES
"[facility]  Chief Complaint   Patient presents with     Annual Comprehensive Nursing Home       HPI:    Jayne Vasquez is a 87 year old  (9/21/1930), who is being seen today for an annual comprehensive visit at [facility] .  HPI information obtained from: facility chart records, facility staff, patient report, [facility] chart review and family/first contact pt's son Zion report.  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  Depression, unspecified depression type  Anxiety  Insomnia due to medical condition  History of 3 falls in the past 3 months  On buspar 5 mg TID, effexor  mg qHS, seroquel 25 mg TID, trazodone 50 mg HS  (Patient also on gabapentin 600 mg qAM, 900 mg qPM for neuropathy)    BIMS noting \"moderate to severe\" impairment  PHQ9 - 3    Patient impulsive, non-compliant with asking for help with self-transfers  Patient has had severe anxiety which has resulted in ED visits in the past (none recently)  Patient in the past has had paranoia and delusions, hallucinations of men in her room trying to hurt her (and having hurt her), people stealing her clothes when they are in the wash (pt has to sit next to the washing machine), bleeding profusely with no blood present, etc.    Patient has failed GDR of seroquel in the past.  No recent behaviors.      Type 2 diabetes " mellitus with complication, with long-term current use of insulin (H)  Metformin DC'd 2/2017  On basaglar 16 units qAM, Novolog SSI  accuchecks TID AC  AMs:  (0-2 SSI, mostly 0)  Lunch:  (0-4 SSI)  Dinner: 217-330 (4-8 SSI)    Patient also on gabapentin 600 mg qAM, 900 mg qPM for neuropathy    Patient on ASA, ACEI, no statin d/t advanced age.     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)  Coronary artery disease involving native coronary artery of native heart without angina pectoris  Diastolic dysfunction, left ventricle, grade I by Echo  LVH (left ventricular hypertrophy) due to hypertensive disease, with heart failure (H)  Old myocardial infarction  Mitral valve insufficiency, unspecified etiology  Aortic stenosis, mild - per Echo  Chronic atrial fibrillation (H)  On amlodipine 10 mg qPM, ASA 81 mg daily, carvedilol 25 gm BID, lisinopril 40 mg daily, nitro PRN - no usage  6/2015 ECHO woth EF 60-65%, + LVH    BPs /50-70  HR 70-80s  Patient unable to comment on symptoms of CP, HA, lightheadedness.  Noted lightheadedness in the past    Weights overall down in last few months:  12/3/17 - 191.4  1/3/18 - 187  2/1/18 - 180  3/1/18 - 184    Chronic obstructive pulmonary disease, unspecified COPD type (H)  Also has history of rhinitis - on fluticasone nasal spray qHS  Sats 94-95% on RA  Patient denies SOB today (may be poor historian)  On no controller meds    CKD stage 3, due to effects of DM II and HTN  Last 3 BMPs:   7/31/17: BUN 29, Creat 1.40, GFR 36  11/20/17: BUN 28, Creat 1.37, GFR 36  3/2/18: BUN 31, Creat 1.36, GFR 37    Patient on no diuretics despite CHF dx.       Gastroesophageal reflux disease, esophagitis presence not specified  History of GI bleed  On simethicone q4 hours PRN - no usage  Patient unable to report on symptoms of heartburn or upset stomach  Patient's son denies any reports of bloating or gassy feeling.     Neuropathy involving both lower  extremities  On gabapentin 600 mg qAM, 900 mg qPM  Patient previously had had a lot of pain in her feet, no complaints recently.   Also has DM2    Thrombocytopenia (H)  Anemia in stage 3 chronic kidney disease  9.1.17 - 124  3/2/18 - 142  No bleeding noted.    Hemoglobin   Date Value Ref Range Status   03/02/2018 11.8 11.7 - 15.7 g/dL Final   09/01/2017 11.9 11.7 - 15.7 g/dL Final   ]  Chronic and mild without definitive diagnosis but apparently has been worked up in the past. Peripheral smear 6/26 shows normocytic anemia, thrombocytopenia.     Other chronic pain  Patient has history of back pain, feet pain (neuropathy), neck pain  On Tylenol 650 mg BID and 650 mg TID PRN - no usage, analgesic cream to neck BID and  BID PRN.   Patient today denies pain.     ALLERGIES: Dye [contrast dye]; Fluoxetine; Iodine-131; Methocarbamol; Paroxetine; and Penicillins  PROBLEM LIST:  Patient Active Problem List   Diagnosis     Pulmonary HTN     Hyperlipidemia LDL goal <100     GERD (gastroesophageal reflux disease)     COPD (chronic obstructive pulmonary disease) (H)     Old myocardial infarction     CKD stage 3, due to effects of DM II and HTN GFR 9/2012 51; 4/2012 59; 12/11 53; 10/2011 59     Other and unspecified angina pectoris due to effects of htn heart disease     Transient ischemic attack (TIA), and cerebral infarction without residual deficits(V12.54)     Convulsions (H)     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)     Peripheral autonomic neuropathy in disorders classified elsewhere     Blindness, legal     Anxiety     Thrombocytopenia (H)     Anemia of other chronic disease due to CKD     Constipation     Headache     Diarrhea     CAD s/p CABG 2012; angio 2008 - occluded RCA and RCA stent, distal circ occlusion with open prox-mid circ stent, 30% D2 stenosis     Visual hallucinations     MR (mitral regurgitation) - mild on Echo     Aortic stenosis, mild - per Echo     LVH (left ventricular  hypertrophy) due to hypertensive disease - mild-moderate     Diastolic dysfunction, left ventricle, grade I by Echo     Atrial fibrillation (H)     Postsurgical aortocoronary bypass status     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)     Overactive bladder     Neuropathy of lower extremity     Yeast infection of the skin     Moderate major depression (H)     Cystitis, chronic     Cellulitis     Atherosclerotic peripheral vascular disease with intermittent claudication (H)     Type 2 diabetes mellitus with manifestations - retinopathy, neuropathy, nephropathy     Seizure disorder (H)     Diabetic foot infection (H)     Leukopenia     Advanced directives, counseling/discussion     Type 2 diabetes, HbA1C goal < 8% (H)     Orthostatic hypotension     Nonhealing nonsurgical wound     Bone infection (H)     Vascular dementia     Fracture of phalanx of finger     Fall from bed     Neck pain on right side     Intracerebral hemorrhage (H)     HTN, goal below 140/90     Urinary frequency     Cerebral infarction (H)     Health Care Home     Anemia     Back pain     Confusion     Paranoia (H)     Depression     Dementia     Psychosis     Nontraumatic hemorrhage of cerebral hemisphere (H)     Frail elderly     Morbid obesity (H)     PAST MEDICAL HISTORY:  has a past medical history of Anemia, unspecified (11/04/10); Aphasia (02/12/10); Cataracts; Cerebral embolism with cerebral infarction (H) (02/16/10); Chest pain (11/04/10); Confusion (01/30/10); Congestive heart failure, unspecified; Coronary atherosclerosis of unspecified type of vessel, native or graft (05/20/08); Diabetes mellitus (H); Diabetic eye exam (H) (3/26/15); Diabetic infection of left foot (2/26/2013); Diastolic dysfunction, left ventricle, grade I by Echo (4/2/2012); GERD (gastroesophageal reflux disease) (11/04/10); GI bleed; History of blood transfusion; History of recurrent UTIs; Hyperlipemia (4.22.11); Hypertension;  Hyponatremia; Intermediate coronary syndrome (H); Labral tear of long head of biceps tendon; LVH (left ventricular hypertrophy) due to hypertensive disease - mild-moderate (4/2/2012); Open wound of left foot in 4th interdigital space (2/26/2013); Osteoarthrosis, shoulder region (07/19/09); Rotator cuff tear; Seizure disorder, secondary (H) (02/16/10); Subacromial bursitis; Transient cerebral ischemia (3/30/2012); Unspecified cerebral artery occlusion with cerebral infarction; and Vitamin D deficiency (02/08/10). She also has no past medical history of Asthma; Malignant neoplasm (H); or Thyroid disease.  PAST SURGICAL HISTORY:  has a past surgical history that includes appendectomy; Cholecystectomy; Hysterectomy; surgical history of - ; colonoscopy; cataract iol, rt/lt; surgical history of - ; surgical history of -  (5/2008); surgical history of - ; surgical history of - ; colonoscopy (5/31/11); surgical history of -  (5/31/11); cardiac catherization (05/20/08); Esophagoscopy, gastroscopy, duodenoscopy (EGD), combined (4/5/2012); Bypass graft artery coronary (4/9/2012); and Phacoemulsification with standard intraocular lens implant (Right, 4/16/2015).  FAMILY HISTORY: family history includes C.A.D. in her father, mother, and son; CANCER in her mother and sister; CEREBROVASCULAR DISEASE in her sister; Neurologic Disorder in her son.  SOCIAL HISTORY:  reports that she has never smoked. She has never used smokeless tobacco. She reports that she does not drink alcohol or use illicit drugs.  IMMUNIZATIONS:  Most Recent Immunizations   Administered Date(s) Administered     Influenza (High Dose) 3 valent vaccine 11/20/2015     Influenza (IIV3) PF 10/01/2014     Pneumococcal 23 valent 06/24/2015     Tdap (Adacel,Boostrix) 11/20/2015     Above immunizations pulled from [facility]. [facility] and facility records also reconciled. Outstanding information sent to  to update [facility].  Future immunizations are  not needed at this point as all recommended immunizations are up to date.   MEDICATIONS:  Current Outpatient Prescriptions   Medication Sig Dispense Refill     QUEtiapine Fumarate (SEROQUEL PO) Take 12.5 mg by mouth daily as needed (also 12.5 mg scheduled afternoon)       mupirocin (BACTROBAN) 2 % ointment Apply topically 2 times daily as needed (also bid unitl healed)       Insulin Glargine (BASAGLAR KWIKPEN SC) Inject 8 Units Subcutaneous 2 times daily        insulin aspart (NOVOLOG FLEXPEN) 100 UNIT/ML injection Inject Subcutaneous 3 times daily (with meals) If Blood Sugar is 150 to 199, give 2 Units. If Blood Sugar is 200 to 249, give 4 Units. If Blood Sugar is 250 to 299, give 6 Units. If Blood Sugar is 300 to 349, give 8 Units. If Blood Sugar is greater than 349, give 10 Units. If Blood Sugar is greater than 349, call NP/PA.       Glycerin-Dimeth-Surfactants (ONE-STEP SKIN CARE LOTION) LOTN Externally apply topically daily       sorbitol 70 % SOLN solution Take 30 mLs by mouth daily as needed for constipation       GABAPENTIN PO Take 600 mg by mouth every morning Also 900 QHS       BusPIRone HCl (BUSPAR PO) Take 5 mg by mouth 3 times daily        trolamine salicylate (ASPERCREME) 10 % cream Apply topically as needed for moderate pain (also BID)        hydrocortisone (ANUSOL-HC) 2.5 % rectal cream Place rectally 3 times daily as needed        hypromellose (ARTIFICIAL TEARS) 0.5 % SOLN 1 drop 3 times daily as needed for dry eyes       nystatin (MYCOSTATIN) 793687 UNIT/GM POWD        Cranberry-Vitamin C-Inulin (UTI-STAT PO) Take by mouth daily       QUEtiapine Fumarate (SEROQUEL PO) Take 25 mg by mouth 2 times daily Am & Pm       Carvedilol (COREG PO) Take 25 mg by mouth 2 times daily (with meals)       Venlafaxine HCl (EFFEXOR XR PO) Take 150 mg by mouth At Bedtime        Acetaminophen (TYLENOL PO) Take 650 mg by mouth 2 times daily Also TID PRN       ASPIRIN PO Take 81 mg by mouth daily       LISINOPRIL PO Take  30 mg by mouth daily        fluticasone (FLONASE) 50 MCG/ACT nasal spray Spray 1 spray into both nostrils At Bedtime       sennosides (SENOKOT) 8.6 MG tablet Take 2 tablets by mouth 2 times daily        polyethylene glycol (MIRALAX/GLYCOLAX) powder Take 17 g by mouth daily as needed for constipation        alum & mag hydroxide-simethicone (MAALOX ADVANCED MAX ST) 400-400-40 MG/5ML SUSP Take 30 mLs by mouth every 4 hours as needed PRN for for stomach distress  0     nitroglycerin (NITROSTAT) 0.4 MG SL tablet Place 1 tablet (0.4 mg) under the tongue every 5 minutes as needed 25 tablet      levETIRAcetam (KEPPRA) 100 MG/ML solution Take 7.5 mLs (750 mg) by mouth every 12 hours       traZODone (DESYREL) 50 MG tablet Take 1 tablet (50 mg) by mouth At Bedtime 31 tablet 12     amLODIPine (NORVASC) 10 MG tablet Take 1 tablet (10 mg) by mouth daily 90 tablet 3     Medications reviewed:  Medications reconciled to facility chart and changes were made to reflect current medications as identified as above med list. Below are the changes that were made:   Medications stopped since last EPIC medication reconciliation:   There are no discontinued medications.    Medications started since last [facility] medication reconciliation:  No orders of the defined types were placed in this encounter.      Case Management:  I have reviewed the facility/SNF care plan/MDS which was done 3/14/18, including the falls risk, nutrition and pain screening. I also reviewed the current immunizations, and preventive care..Future cancer screening is not clinically indicated secondary to age/goals of care Patient's desire to return to the community is not assessible due to cognitive impairment. Current Level of Care is appropriate.    Advance Directive Discussion:    I reviewed the current advanced directives as reflected in EPIC, the POLST and the facility chart, and verified the congruency of orders. I contacted the first party , Zion (pt's son) and  "discussed POC, noting recent review of POLST and new POLST signed by family - I will sign today.   I did not due to cognitive impairment review the advance directives with the resident.     Team Discussion:  I communicated with the appropriate disciplines involved with the Plan of Care:   Nursing      Patient Goal:  Patient's goal is unobtainable secondary to cognitive impairment.    Information reviewed:  Medications, vital signs, orders, and nursing notes.    ROS:  Unobtainable secondary to cognitive impairment.     Exam:  BP 90/57  Pulse 80  Temp 95  F (35  C)  Resp 20  Ht 5' 2\" (1.575 m)  Wt 184 lb (83.5 kg)  SpO2 94%  BMI 33.65 kg/m2  GENERAL APPEARANCE:  Alert, in no distress, confused  ENT:  Mouth and posterior oropharynx normal, moist mucous membranes, hearing acuity slightly [place]  EYES:  EOM, conjunctivae, lids, pupils and irises normal  NECK:  No adenopathy,masses or thyromegaly  RESP:  respiratory effort and palpation of chest normal, no respiratory distress, Lung sounds clear  CV:  Palpation and auscultation of heart done , rate and rhythm regular, no  murmur, no rub or gallop, Edema none  ABDOMEN:  Obese, normal bowel sounds, soft, nontender, no hepatosplenomegaly or other masses but limited assessment able  M/S:   Gait and station with WC mobility, Digits and nails with arthritic changes  SKIN:  Inspection/Palpation of skin and subcutaneous tissue pale, some mild bruising to skin  NEURO: 2-12 in normal limits and at patient's baseline  PSYCH:  insight and judgement, memory with impairment, affect and mood normal      Lab/Diagnostic data:      CBC RESULTS:   Recent Labs   Lab Test  03/02/18 0815 09/01/17   0750   WBC  5.5  4.6   RBC  3.95  3.71*   HGB  11.8  11.9   HCT  37.2  35.3   MCV  94  95   MCH  29.9  32.1   MCHC  31.7  33.7   RDW  12.6  12.5   PLT  142*  124*       Last Basic Metabolic Panel:  Recent Labs   Lab Test  03/02/18   0815  11/20/17   0735  09/01/17   0750  07/31/17   0720   NA "   --   142   --   141   POTASSIUM   --   4.4   --   4.3   CHLORIDE   --   105   --   103   ELIZABETH   --   8.6   --   8.5   CO2   --   29   --   31   BUN  31*  28  25  29   CR  1.36*  1.37*  1.33*  1.40*   GLC   --   156*  135*  193*       Lab Results   Component Value Date    A1C 7.7 12/27/2017    A1C 7.6 07/31/2017         ASSESSMENT/PLAN  Hx of Nontraumatic hemorrhage of cerebral hemisphere, unspecified laterality (H)  Convulsions, unspecified convulsion type (H)  No seizures since CVA - still on Keppra.  Last level WNL.  Will monitor.   Expressive aphasia noted. Patience with communication required.     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 Patient's son very reluctant and does not want to GDR any.  After some discussion, agreed to decrease Seroquel with keeping PRN so facility can use it in case she fails (in that moment); agreed and will order.    No recent behaviors.    Patient does remain impulsive, doesn't ask for help, is high falls risk.  Nursing for supportive cares.     Type 2 diabetes mellitus with complication, with long-term current use of insulin (H)  Blood glucose showing lower blood glucose levels at lunch and dinner.    In order to determine if basaglar is not lasting 24 hours, will split dosing and monitor Novolog SSI usage.    Family and nrusing noting that patient likes to sleep in so sometimes does not eat breakfast.  This would also play into basaglar dosing/bg management.  Will monitor blood glucose levels with splitting basaglar.     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  Seemingly no CHF exacerbation, exact weights are slightly decreased.  Lung sounds clear, no lower extremity edema.  BP is slightly under goal of SBP around 140/90 so will decrease ACEI as it is at max dose.  Will not change BB as HR regular today and rate controlled.   CCB could also be adjusted but with significant cardiac history, will start with ACEI reduction.     Chronic obstructive pulmonary disease, unspecified COPD type (H)  Seemingly very stable without controller meds.  Patient denies S OB, lung sounds clear  Will monitor for titration needs    CKD stage 3, due to effects of DM II and HTN  Stable kidney function  Will avoid nephrotoxic agents and mindful prescribing with renal dosing.     Gastroesophageal reflux disease, esophagitis presence not specified  No evidence of heartburn, upset stomach, bloating  We will DC simethicone and monitor for needs    Neuropathy involving both lower extremities  Patient with increasing behaviors in the past when her feet have been hurting.  No GDR of gabapentin at this time as an attempting GDR of antipsychotic.    Thrombocytopenia (H)  Anemia in stage 3 chronic kidney disease  Stable.  We will monitor.    Other chronic pain  Patient, nursing, family denying any recent pain.  Will monitor for titration needed    Orders:  1.  D/c current Basaglar.  2.  Basaglar 8 units sq BID.  Dx: DM2  3.  D/c S.O. For Mag lab in Sept (pt not on PPI).  4.  D/c Simethicone PRN.  5.  Decrease Lisinopril to 30 mg po QD.  Dx: HTN  6.  Change Seroquel to 25 mg po BID (am & pm) & 12.5 mg po in afternoon.  Dx:  GDR  7.  Seroquel 12.5 mg po QD PRN x 14 days.  Update NP on 4/2/18 re: usage.  Dx: dementia w/psychosis, paranoid, hallucinations     Electronically signed by:  LORI Ricks CNP

## 2018-03-30 ENCOUNTER — NURSING HOME VISIT (OUTPATIENT)
Dept: GERIATRICS | Facility: CLINIC | Age: 83
End: 2018-03-30
Payer: COMMERCIAL

## 2018-03-30 VITALS
RESPIRATION RATE: 18 BRPM | DIASTOLIC BLOOD PRESSURE: 76 MMHG | HEART RATE: 64 BPM | SYSTOLIC BLOOD PRESSURE: 136 MMHG | TEMPERATURE: 97.8 F | WEIGHT: 184 LBS | BODY MASS INDEX: 33.65 KG/M2 | OXYGEN SATURATION: 95 %

## 2018-03-30 DIAGNOSIS — Z79.4 TYPE 2 DIABETES MELLITUS WITH COMPLICATION, WITH LONG-TERM CURRENT USE OF INSULIN (H): Primary | ICD-10-CM

## 2018-03-30 DIAGNOSIS — E11.8 TYPE 2 DIABETES MELLITUS WITH COMPLICATION, WITH LONG-TERM CURRENT USE OF INSULIN (H): Primary | ICD-10-CM

## 2018-03-30 DIAGNOSIS — F01.518 VASCULAR DEMENTIA WITH BEHAVIOR DISTURBANCE (H): ICD-10-CM

## 2018-03-30 PROCEDURE — 99308 SBSQ NF CARE LOW MDM 20: CPT | Performed by: NURSE PRACTITIONER

## 2018-03-30 NOTE — PROGRESS NOTES
[facility]    Chief Complaint   Patient presents with     Nursing Home Acute       HPI:    Jayne Vasquez is a 87 year old  (9/21/1930), who is being seen today for an episodic care visit at [facility].    HPI information obtained from: facility chart records, facility staff and [facility] chart review. Today's concern is:     Type 2 diabetes mellitus with complication, with long-term current use of insulin (H)  Vascular dementia with behavior disturbance     Nursing reporting increase in blood glucose levels.  Patient currently on basaglar 8 units BID (split at last visit d/t patient sometimes not eating breakfast or eating breakfast late d/t sleeping in).    Patient also on Novolog SSI.    AMs: 95, 155, 112, 134, 135 (0-2 SSI)  Lunch: 324, 301, 369, 352 (8-10 units SSI)  Dinner: 340, 221, 154, 107, 342 (2-9 units mostly)    Patient unable to report on eating patterns, etc d/t advanced dementia.     REVIEW OF SYSTEMS:  Unobtainable secondary to cognitive impairment.     /76  Pulse 64  Temp 97.8  F (36.6  C)  Resp 18  Wt 184 lb (83.5 kg)  SpO2 95%  BMI 33.65 kg/m2  GENERAL APPEARANCE: in no distress  RESP: RR regular, no respiratory distress, breathing comfortably.   ABDOMEN:  Obese, nondistended  M/S:   Gait and station with WC mobility, Digits and nails with arthritic changes at basleine, reduced muscle mass  SKIN:  Inspection and Palpation of skin and subcutaneous tissue pale, intact  PSYCH:  insight and judgement, memory with severe impairment, affect and mood normal, does not follow commands readily           ASSESSMENT/PLAN:     Type 2 diabetes mellitus with complication, with long-term current use of insulin (H)  Vascular dementia with behavior disturbance     Will add lunch and dinner Novolog scheduled and continue with SSI at this time to monitor need.    Lab Results   Component Value Date    A1C 7.7 12/27/2017    A1C 7.6 07/31/2017    A1C 7.5 04/26/2017    A1C   01/25/2017     Canceled, Test credited   Duplicate request  CORRECTED ON 01/25 AT 0913: PREVIOUSLY REPORTED AS 7.4      A1C 7.3 01/06/2017   Goal: HgbA1C between 8-9%. Per AGS there is potential harm in lowering the A1C to <6.5% in older adults with diabetes.   May anticipate Hgb A1C redraw soon.     Orders:   1.  Novolog 5 units sq BID w/meals (lunch & dinner).  Dx: DM2    Electronically signed by:  LORI Ricks CNP

## 2018-04-02 ENCOUNTER — CLINICAL UPDATE (OUTPATIENT)
Dept: PHARMACY | Facility: CLINIC | Age: 83
End: 2018-04-02

## 2018-04-02 NOTE — PROGRESS NOTES
This patient's medication list and chart were reviewed as part of the service provided by [facility].    Assessment/Recommendations:  1. (h/o seizures):  Appears pt has h/o seizure post-CVA, and none since addition of Keppra 750mg bid.  If no seizures in last 3-5yrs, may consider reduction in Keppra dose to 500mg bid and monitor.  Keppra can contribute to CNS se's such as psychosis, anxiety/agitation.  Of note, do not need to check Keppra levels because there is no determined reference range.  Dosing should be based on tolerability and efficacy.  2. (Dementia w/ psychosis):  Recent GDR of Quetiapine per chart review.  In future, following potential reduction of Keppra, may consider further Quetiapine reduction if pt tolerates recent reduction in Quetiapine dose.  As noted above, Keppra may contribute to CNS se's such as psychosis, anxiety/agitation.       Zohreh Finney, Pharm.D.,[facility] [place]  Board Certified Geriatric Pharmacist  Medication Therapy Management Pharmacist  [phone]

## 2018-04-09 NOTE — PROGRESS NOTES
"[facility]  Chief Complaint   Patient presents with     Annual Comprehensive Nursing Home       HPI:    Jayne Vasquez is a 87 year old  (9/21/1930), who is being seen today for an annual comprehensive visit [facility] .  HPI information obtained from: facility chart records, facility staff, patient report and [facility] chart review.  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  Patient often reporting seeing men in her room who are going to hurt her, or afraid that people will steal her laundry when it is being washed, etc.      BIMS - \"severe\"  PHQ9 - 3  Nursing reports state that patient has made comments of \"I'd rather be dead.\"  Patient on buspar 5 mg TID, effexor  mg qHS, seroquel 25 mg qAM and PM and 12.5 mg mid-day, trazodone 50 mg qHS.     Nursing reporting no behaviors recently during the day but reports from evening shift is that patient sundowns and has more behaviors. Last behavior per nursing notes was 4/2 (8 days ago) when she was very upset about her laundry being done.   Nursing notes reporting that patient is sleeping well at night.  Nursing reporting that patient eats always at least 50% of her meals.      Hx of Nontraumatic hemorrhage of cerebral hemisphere, unspecified laterality (H)  Transient ischemic attack (TIA), and cerebral infarction without residual deficits(V12.54)  Convulsions, unspecified convulsion type (H)  2010 hemorrhagic CVA after fall at home with seizures post CVA.  On ASA 81 mg daily for ppx  On levitiracetam 750 mg BID for seizures (last level: 33)  No seizures since (by report)    Type 2 diabetes mellitus with complication, with " long-term current use of insulin (H)  Metformin DC'd 2/2017  Lab Results   Component Value Date    A1C 7.7 12/27/2017    A1C 7.6 07/31/2017    A1C 7.5 04/26/2017    A1C  01/25/2017     Canceled, Test credited   Duplicate request  CORRECTED ON 01/25 AT 0913: PREVIOUSLY REPORTED AS 7.4      A1C 7.3 01/06/2017     On basaglar 8 units BID, Novolog 5 units with lunch and dinner, SSI    Blood glucose monitoring:  Ams:  (0-2 units mostly)  Lunch: 100-399 (4-8 units mostly)  Dinner: 171-327 (4-6 units mostly)    Neuropathy involving both lower extremities  Patient on gabapentin 600 mg qAM, 900 mg qHS  Patient had always reported pain in her feet.    Today patient unable to comment on s/s of pain or descriptors of type of pain.     Atherosclerotic peripheral vascular disease with intermittent claudication (H)  On ASA 81 mg daily for blood flow.   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)  Coronary artery disease involving native coronary artery of native heart without angina pectoris  Hyperlipidemia LDL goal <100  Diastolic dysfunction, left ventricle, grade I by Echo  LVH (left ventricular hypertrophy) due to hypertensive disease, with heart failure (H)  Old myocardial infarction  Mitral valve insufficiency, unspecified etiology  Aortic stenosis, mild - per Echo  Orthostatic hypotension  On norvasc 10 mg daily, coreg 25 mg BID, lisinopril 30 mg daily, nitro PRN    BPs 130-140s/70-80s  HRs   Patient unable to comment on symptoms of CP, HA, lightheadedness    Weights:  11/2/17 - 190.4  12/3/17 - 191.4  1/3/18 - 187  2/1/18 - 180  3/1/18 - 184  4/1/18 - 183.4    6/17/2015 ECHO with EF 60-65% with LVH     Chronic atrial fibrillation (H)  On ASA 81 mg daily for anticoagulation  Previously on coumadin but was DC'd d/t CVA bleed.   HR regular today,  per EHR - on Coreg 25 mg BID for rate control.        Chronic obstructive pulmonary  disease, unspecified COPD type (H)  On no meds  Patient denies SOB today (may be poor historian)  On fluticasone 50 mcg/act qHS  Sats 94-95% on RA.   No cough witnessed recently, nor during visit today.     CKD stage 3, due to effects of DM II and HTN  Cardio-renal with CHF  Baseline Creat seemingly around 1.3  Last 3 BMPs:   3/2/18: BUN 31, Creat 1.36, GFR 37  11/20/17: BUN 28, Creat 1.37, GFR 36  9/1/17: BUN 25, Creat 1.33, GFR 38      Gastroesophageal reflux disease, esophagitis presence not specified  Patient on no meds  Unable to report on s/s of heartburn or upset stomach, nursing noting no complaints    Anemia in stage 3 chronic kidney disease  Baseline Hgb around 12  Hemoglobin   Date Value Ref Range Status   03/02/2018 11.8 11.7 - 15.7 g/dL Final   09/01/2017 11.9 11.7 - 15.7 g/dL Final   ]  No signs of bleeding  On no meds    Other chronic pain  History of chronic LBP and neck pain, feet neuropathy  Patient has gabapentin for neuroapthy  Also on Tylneol 650 mg BID and 650 mg TID PRN, analgesic creme 10% topical BID and BID PRN.     Patient today denying pain (may be poor historian)    Pain of toe of right foot  Patient has known ingrown toenail.  Bacitracin ordered to keep it moist but patient often refusing.  Patient to se Podiatry tomorrow.       ALLERGIES: Dye [contrast dye]; Fluoxetine; Iodine-131; Methocarbamol; Paroxetine; and Penicillins  PROBLEM LIST:  Patient Active Problem List   Diagnosis     Pulmonary HTN     Hyperlipidemia LDL goal <100     GERD (gastroesophageal reflux disease)     COPD (chronic obstructive pulmonary disease) (H)     Old myocardial infarction     CKD stage 3, due to effects of DM II and HTN GFR 9/2012 51; 4/2012 59; 12/11 53; 10/2011 59     Other and unspecified angina pectoris due to effects of htn heart disease     Transient ischemic attack (TIA), and cerebral infarction without residual deficits(V12.54)     Convulsions (H)     Diabetes mellitus type 2 with neurological  manifestations (H)     Peripheral autonomic neuropathy in disorders classified elsewhere     Blindness, legal     Anxiety     Thrombocytopenia (H)     Anemia of other chronic disease due to CKD     Constipation     Headache     Diarrhea     CAD s/p CABG 2012; angio 2008 - occluded RCA and RCA stent, distal circ occlusion with open prox-mid circ stent, 30% D2 stenosis     Visual hallucinations     MR (mitral regurgitation) - mild on Echo     Aortic stenosis, mild - per Echo     LVH (left ventricular hypertrophy) due to hypertensive disease - mild-moderate     Diastolic dysfunction, left ventricle, grade I by Echo     Atrial fibrillation (H)     Postsurgical aortocoronary bypass status     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)     Overactive bladder     Neuropathy of lower extremity     Yeast infection of the skin     Moderate major depression (H)     Cystitis, chronic     Cellulitis     Atherosclerotic peripheral vascular disease with intermittent claudication (H)     Type 2 diabetes mellitus with manifestations - retinopathy, neuropathy, nephropathy     Seizure disorder (H)     Diabetic foot infection (H)     Leukopenia     Advanced directives, counseling/discussion     Type 2 diabetes, HbA1C goal < 8% (H)     Orthostatic hypotension     Nonhealing nonsurgical wound     Bone infection (H)     Vascular dementia     Fracture of phalanx of finger     Fall from bed     Neck pain on right side     Intracerebral hemorrhage (H)     HTN, goal below 140/90     Urinary frequency     Cerebral infarction (H)     Health Care Home     Anemia     Back pain     Confusion     Paranoia (H)     Depression     Dementia     Psychosis     Nontraumatic hemorrhage of cerebral hemisphere (H)     Frail elderly     Morbid obesity (H)     PAST MEDICAL HISTORY:  has a past medical history of Anemia, unspecified (11/04/10); Aphasia (02/12/10); Cataracts; Cerebral embolism with cerebral infarction (H)  (02/16/10); Chest pain (11/04/10); Confusion (01/30/10); Congestive heart failure, unspecified; Coronary atherosclerosis of unspecified type of vessel, native or graft (05/20/08); Diabetes mellitus (H); Diabetic eye exam (H) (3/26/15); Diabetic infection of left foot (2/26/2013); Diastolic dysfunction, left ventricle, grade I by Echo (4/2/2012); GERD (gastroesophageal reflux disease) (11/04/10); GI bleed; History of blood transfusion; History of recurrent UTIs; Hyperlipemia (4.22.11); Hypertension; Hyponatremia; Intermediate coronary syndrome (H); Labral tear of long head of biceps tendon; LVH (left ventricular hypertrophy) due to hypertensive disease - mild-moderate (4/2/2012); Open wound of left foot in 4th interdigital space (2/26/2013); Osteoarthrosis, shoulder region (07/19/09); Rotator cuff tear; Seizure disorder, secondary (H) (02/16/10); Subacromial bursitis; Transient cerebral ischemia (3/30/2012); Unspecified cerebral artery occlusion with cerebral infarction; and Vitamin D deficiency (02/08/10). She also has no past medical history of Asthma; Malignant neoplasm (H); or Thyroid disease.  PAST SURGICAL HISTORY:  has a past surgical history that includes appendectomy; Cholecystectomy; Hysterectomy; surgical history of - ; colonoscopy; cataract iol, rt/lt; surgical history of - ; surgical history of -  (5/2008); surgical history of - ; surgical history of - ; colonoscopy (5/31/11); surgical history of -  (5/31/11); cardiac catherization (05/20/08); Esophagoscopy, gastroscopy, duodenoscopy (EGD), combined (4/5/2012); Bypass graft artery coronary (4/9/2012); and Phacoemulsification with standard intraocular lens implant (Right, 4/16/2015).  FAMILY HISTORY: family history includes C.A.D. in her father, mother, and son; CANCER in her mother and sister; CEREBROVASCULAR DISEASE in her sister; Neurologic Disorder in her son.  SOCIAL HISTORY:  reports that she has never smoked. She has never used smokeless tobacco.  She reports that she does not drink alcohol or use illicit drugs.  IMMUNIZATIONS:  Most Recent Immunizations   Administered Date(s) Administered     Influenza (High Dose) 3 valent vaccine 11/20/2015     Influenza (IIV3) PF 10/01/2014     Pneumococcal 23 valent 06/24/2015     Tdap (Adacel,Boostrix) 11/20/2015     Above immunizations pulled from [facility]. [facility] and facility records also reconciled. Outstanding information sent to  to update [facility]   Future immunizations are not needed at this point as all recommended immunizations are up to date.   MEDICATIONS:  Current Outpatient Prescriptions   Medication Sig Dispense Refill     ketotifen (ZADITOR/REFRESH ANTI-ITCH) 0.025 % SOLN ophthalmic solution Place 1 drop into both eyes 2 times daily as needed for itching       insulin aspart (NOVOLOG FLEXPEN) 100 UNIT/ML injection Inject 7 Units Subcutaneous 2 times daily (with meals)        QUEtiapine Fumarate (SEROQUEL PO) Take 25 mg by mouth At Bedtime        mupirocin (BACTROBAN) 2 % ointment Apply topically 2 times daily as needed (also bid unitl healed)       Insulin Glargine (BASAGLAR KWIKPEN SC) Inject 8 Units Subcutaneous 2 times daily        insulin aspart (NOVOLOG FLEXPEN) 100 UNIT/ML injection Inject Subcutaneous 3 times daily (with meals) If Blood Sugar is 150 to 199, give 2 Units. If Blood Sugar is 200 to 249, give 4 Units. If Blood Sugar is 250 to 299, give 6 Units. If Blood Sugar is 300 to 349, give 8 Units. If Blood Sugar is greater than 349, give 10 Units. If Blood Sugar is greater than 349, call NP/PA.       Glycerin-Dimeth-Surfactants (ONE-STEP SKIN CARE LOTION) LOTN Externally apply topically daily       sorbitol 70 % SOLN solution Take 30 mLs by mouth daily as needed for constipation       GABAPENTIN PO Take 600 mg by mouth every morning Also 900 QHS       BusPIRone HCl (BUSPAR PO) Take 5 mg by mouth 3 times daily        trolamine salicylate (ASPERCREME) 10 % cream Apply  topically as needed for moderate pain (also BID)        hydrocortisone (ANUSOL-HC) 2.5 % rectal cream Place rectally 3 times daily as needed        hypromellose (ARTIFICIAL TEARS) 0.5 % SOLN 1 drop 3 times daily as needed for dry eyes       nystatin (MYCOSTATIN) 440262 UNIT/GM POWD Apply topically 2 times daily as needed        Cranberry-Vitamin C-Inulin (UTI-STAT PO) Take by mouth daily       QUEtiapine Fumarate (SEROQUEL PO) Take 12.5 mg by mouth 2 times daily Am & midday       Carvedilol (COREG PO) Take 25 mg by mouth 2 times daily (with meals)       Venlafaxine HCl (EFFEXOR XR PO) Take 150 mg by mouth At Bedtime        Acetaminophen (TYLENOL PO) Take 650 mg by mouth 2 times daily Also TID PRN       ASPIRIN PO Take 81 mg by mouth daily       LISINOPRIL PO Take 30 mg by mouth daily        fluticasone (FLONASE) 50 MCG/ACT nasal spray Spray 1 spray into both nostrils At Bedtime       sennosides (SENOKOT) 8.6 MG tablet Take 2 tablets by mouth 2 times daily        polyethylene glycol (MIRALAX/GLYCOLAX) powder Take 17 g by mouth daily as needed for constipation        nitroglycerin (NITROSTAT) 0.4 MG SL tablet Place 1 tablet (0.4 mg) under the tongue every 5 minutes as needed 25 tablet      levETIRAcetam (KEPPRA) 100 MG/ML solution Take 7.5 mLs (750 mg) by mouth every 12 hours       traZODone (DESYREL) 50 MG tablet Take 1 tablet (50 mg) by mouth At Bedtime 31 tablet 12     amLODIPine (NORVASC) 10 MG tablet Take 1 tablet (10 mg) by mouth daily 90 tablet 3     Medications reviewed:  Medications reconciled to facility chart and changes were made to reflect current medications as identified as above med list. Below are the changes that were made:   Medications stopped since last EPIC medication reconciliation:   Medications Discontinued During This Encounter   Medication Reason     alum & mag hydroxide-simethicone (MAALOX ADVANCED MAX ST) 400-400-40 MG/5ML SUSP Medication Reconciliation Clean Up       Medications started  "since last [facility] medication reconciliation:  Orders Placed This Encounter   Medications     ketotifen (ZADITOR/REFRESH ANTI-ITCH) 0.025 % SOLN ophthalmic solution     Sig: Place 1 drop into both eyes 2 times daily as needed for itching       Case Management:  I have reviewed the facility/SNF care plan/MDS which was done 3/14/18, including the falls risk, nutrition and pain screening. I also reviewed the current immunizations, and preventive care..Future cancer screening is not clinically indicated secondary to age/goals of care Patient's desire to return to the community is not assessible due to cognitive impairment. Current Level of Care is appropriate.    Advance Directive Discussion:    I reviewed the current advanced directives as reflected in EPIC, the POLST and the facility chart, and verified the congruency of orders. I did not contacted the first party as known wishes to stay with same DNR/DNI status. I did not due to cognitive impairment review the advance directives with the resident.     Team Discussion:  I communicated with the appropriate disciplines involved with the Plan of Care:   Nursing      Patient Goal:  Patient's goal is unobtainable secondary to cognitive impairment.    Information reviewed:  Medications, vital signs, orders, and nursing notes.    ROS:  10 point ROS of systems including Constitutional, Eyes, Respiratory, Cardiovascular, Gastroenterology, Genitourinary, Integumentary, Muscularskeletal, Psychiatric were all negative except for pertinent positives noted in my HPI.    Exam:  /85  Pulse 100  Temp 97.7  F (36.5  C)  Resp 19  Ht 5' 2\" (1.575 m)  Wt 183 lb 12.8 oz (83.4 kg)  SpO2 95%  BMI 33.62 kg/m2  GENERAL APPEARANCE:  Alert, in no distress, confused  ENT:  Mouth with poor dentition, posterior oropharynx normal, moist mucous membranes, hearing acuity [place]  EYES:  EOM, conjunctivae, lids, pupils and irises normal  NECK:  No adenopathy,masses or thyromegaly  RESP:  " respiratory effort and palpation of chest normal, no respiratory distress, Lung sounds clear  CV:  Palpation and auscultation of heart done , rate and rhythm regular, no  murmur, no rub or gallop, Edema none  ABDOMEN: rounded/obese, normal bowel sounds, soft, nontender, unable to assess hepatomegaly or splenomegaly d/t patient's willingness for exam  M/S:   Gait and station with WC for mobility, Digits and nails with arthritic changes  SKIN:  Inspection/Palpation of skin and subcutaneous tissue pale, dry, fragile,   NEURO: 2-12 in normal limits and at patient's baseline  PSYCH:  insight and judgement, memory with severe impairment, affect and mood normal      Lab/Diagnostic data:    CBC RESULTS:   Recent Labs   Lab Test  03/02/18   0815  09/01/17   0750   WBC  5.5  4.6   RBC  3.95  3.71*   HGB  11.8  11.9   HCT  37.2  35.3   MCV  94  95   MCH  29.9  32.1   MCHC  31.7  33.7   RDW  12.6  12.5   PLT  142*  124*       Last Basic Metabolic Panel:  Recent Labs   Lab Test  03/02/18   0815  11/20/17   0735  09/01/17   0750  07/31/17   0720   NA   --   142   --   141   POTASSIUM   --   4.4   --   4.3   CHLORIDE   --   105   --   103   ELIZABETH   --   8.6   --   8.5   CO2   --   29   --   31   BUN  31*  28  25  29   CR  1.36*  1.37*  1.33*  1.40*   GLC   --   156*  135*  193*       Lab Results   Component Value Date    A1C 7.7 12/27/2017    A1C 7.6 07/31/2017         ASSESSMENT/PLAN  Vascular dementia with behavior disturbance  Paranoia (H)  Visual hallucinations  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  Can attempt GDR of Seroquel by reducing Am dosing.    Will monitor for titration needs  Supportive cares by nursing with increased needs for ADLs based on severe impairment  Needs must be anticipated.     Hx of Nontraumatic hemorrhage of cerebral hemisphere, unspecified  laterality (H)  Transient ischemic attack (TIA), and cerebral infarction without residual deficits(V12.54)  Convulsions, unspecified convulsion type (H)  Continue ASA and Keppra for ppx  stable    Type 2 diabetes mellitus with complication, with long-term current use of insulin (H)  Can increase TIDAC Novolog with goal to DC SSI.   Goal: HgbA1C between 8-9%. Per AGS there is potential harm in lowering the A1C to <6.5% in older adults with diabetes.       Neuropathy involving both lower extremities  Atherosclerotic peripheral vascular disease with intermittent claudication (H)  Stable on current dosing of gabapentin  Monitor LEs for wounds as patient will have reduced healing ability with DM2/small vessel disease.   LEs pale today with +1 posterior tib pulses bilat.     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  Weights trending down but patient appearing euvolemic.   Cardio-renal with significant cardiac history.     Chronic atrial fibrillation (H)  Stable off Coumadin, on ASA for ppx.   Rate controlled with Coreg BID  Stable.   HR regular today    Chronic obstructive pulmonary disease, unspecified COPD type (H)  Stable on no meds  No SOB noted or cough  Sats WNL on RA.  LS clear  Monitor for needs.     CKD stage 3, due to effects of DM II and HTN  Stable with cardio-renal status  Will avoid nephrotoxic agents and mindful prescribing with renal dosing.     Gastroesophageal reflux  disease, esophagitis presence not specified  Stable on no meds  Will monitor for needs    Anemia in stage 3 chronic kidney disease  Stable recently.   No s/s of bleeding noted.   Can check folate and vit b12 level with next set of labs if Hgb trending down.      Other chronic pain  Unknown if Tylenol is helping but as am GDR Seroquel today, will not GDR Tylenol.      Pain of toe of right foot  Podiatry visit tomorrow to look at ingrown great toe on right foot.   Will monitor for communication.     Orders:  1.  Increase Novolog to 7 units sq w/lunch & dinner.  Dx: DM2  2.  Decrease Seroquel to 12.5 mg po Q am & midday, 25 mg po QHS.  Dx: dementia w/psychosis, hallucinations, paranoia     Electronically signed by:  LORI Ricks CNP

## 2018-04-19 NOTE — PROGRESS NOTES
"[place] [facility]    Chief Complaint   Patient presents with     Nursing Home Acute       HPI:    Jayne Vasquez is a 87 year old  (9/21/1930), who is being seen today for an episodic care visit at [facility].    HPI information obtained from: facility chart records, facility staff, patient report and [facility] chart review. 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  Patient often reporting seeing men in her room who are going to hurt her, or afraid that people will steal her laundry when it is being washed, etc.       BIMS - \"severe\"  PHQ9 - 3  Nursing reports state that patient has made comments of \"I'd rather be dead.\"  Patient on buspar 5 mg TID, effexor  mg qHS, seroquel 12.5 mg qAM and afternoon and 25 mg qPM, trazodone 50 mg qHS  Seroquel GDR was trialed, decreased 4/10/18.    Nursing now reporting patient has been with increased crying, inconsolable.  Today patient is met in the common area of the SNF where she is visibly upset and angry.  She has come over to me, stared at me with an angry face, and shook her head.  She was unable/unwilling to talk about what was upsetting her. Nursing reports she was crying earlier.      Nursing reports note patient has been sleeping well.      REVIEW OF SYSTEMS:  Unobtainable secondary to cognitive impairment.     /64  Pulse 82  Temp 97.5  F (36.4  C)  Resp 20  Wt 183 lb 12.8 oz (83.4 kg)  SpO2 95%  BMI 33.62 kg/m2  GENERAL APPEARANCE:  Alert, upset/angry  RESP:  respiratory effort normal, no respiratory distress  ABDOMEN:  Nondistended, obese  M/S:   Gait and station with WC mobility, Digits and nails with arthritic " changes, reduced muscle mass  SKIN:  Inspection and Palpation of skin and subcutaneous tissue pale, seemingly intact  PSYCH:  insight and judgement, memory with severe impairment , affect and mood upset/angry, does not follow commands readily           ASSESSMENT/PLAN:     Vascular dementia with behavior disturbance  Paranoia (H)  Visual hallucinations  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  Nursing has been doing frequent reassurance and consoling but patient appearing upset and scared.    Family's goal is comfort.  Will make changes and monitor for ability to GDR again in future.     Orders:   1.  Return Seroquel to 25 mg po BID and 12.5 mg po midday.  Dx: dementia w/psychosis, hallucinations, paranoia    Electronically signed by:  LORI Ricks CNP

## 2018-04-25 NOTE — PROGRESS NOTES
[place] [facility]    Chief Complaint   Patient presents with     [facility] Regulatory       HPI:    Jayne Vasquez is a 87 year old  (9/21/1930), who is being seen today for a federally mandated E/M visit at [facility] .  HPI information obtained from: facility chart records, facility staff, patient report and [facility] chart review. Today's concerns are:  1. Vascular dementia with behavioral disturbance - stable now that Seroquel restored   2. Paranoia (H) - improved on med   3. Visual hallucinations - improved on med   4. HTN, goal below 140/90 - adequate control on meds   5. Type 2 diabetes mellitus with complication, with long-term current use of insulin (H) - no lows reported, control adequate for age and comorbidities   6. Moderate episode of recurrent major depressive disorder (H) - stable on meds       ALLERGIES: Dye [contrast dye]; Fluoxetine; Iodine-131; Methocarbamol; Paroxetine; and Penicillins  PAST MEDICAL HISTORY:  has a past medical history of Anemia, unspecified (11/04/10); Aphasia (02/12/10); Cataracts; Cerebral embolism with cerebral infarction (H) (02/16/10); Chest pain (11/04/10); Confusion (01/30/10); Congestive heart failure, unspecified; Coronary atherosclerosis of unspecified type of vessel, native or graft (05/20/08); Diabetes mellitus (H); Diabetic eye exam (H) (3/26/15); Diabetic infection of left foot (2/26/2013); Diastolic dysfunction, left ventricle, grade I by Echo (4/2/2012); GERD (gastroesophageal reflux disease) (11/04/10); GI bleed; History of blood transfusion; History of recurrent UTIs; Hyperlipemia (4.22.11); Hypertension; Hyponatremia; Intermediate coronary syndrome (H); Labral tear of long head of biceps tendon; LVH (left ventricular hypertrophy) due to hypertensive disease - mild-moderate (4/2/2012); Open wound of left foot in 4th interdigital space (2/26/2013); Osteoarthrosis, shoulder region (07/19/09); Rotator cuff tear;  Seizure disorder, secondary (H) (02/16/10); Subacromial bursitis; Transient cerebral ischemia (3/30/2012); Unspecified cerebral artery occlusion with cerebral infarction; and Vitamin D deficiency (02/08/10). She also has no past medical history of Asthma; Malignant neoplasm (H); or Thyroid disease.  PAST SURGICAL HISTORY:  has a past surgical history that includes appendectomy; Cholecystectomy; Hysterectomy; surgical history of - ; colonoscopy; cataract iol, rt/lt; surgical history of - ; surgical history of -  (5/2008); surgical history of - ; surgical history of - ; colonoscopy (5/31/11); surgical history of -  (5/31/11); cardiac catherization (05/20/08); Esophagoscopy, gastroscopy, duodenoscopy (EGD), combined (4/5/2012); Bypass graft artery coronary (4/9/2012); and Phacoemulsification with standard intraocular lens implant (Right, 4/16/2015).  FAMILY HISTORY: family history includes C.A.D. in her father, mother, and son; CANCER in her mother and sister; CEREBROVASCULAR DISEASE in her sister; Neurologic Disorder in her son.  SOCIAL HISTORY:  reports that she has never smoked. She has never used smokeless tobacco. She reports that she does not drink alcohol or use illicit drugs.    MEDICATIONS:  Current Outpatient Prescriptions   Medication Sig Dispense Refill     Acetaminophen (TYLENOL PO) Take 650 mg by mouth 2 times daily Also TID PRN       amLODIPine (NORVASC) 10 MG tablet Take 1 tablet (10 mg) by mouth daily 90 tablet 3     ASPIRIN PO Take 81 mg by mouth daily       BusPIRone HCl (BUSPAR PO) Take 5 mg by mouth 3 times daily        Carvedilol (COREG PO) Take 25 mg by mouth 2 times daily (with meals)       Cranberry-Vitamin C-Inulin (UTI-STAT PO) Take by mouth daily       fluticasone (FLONASE) 50 MCG/ACT nasal spray Spray 1 spray into both nostrils At Bedtime       GABAPENTIN PO Take 600 mg by mouth every morning Also 900 QHS       Glycerin-Dimeth-Surfactants (ONE-STEP SKIN CARE LOTION) LOTN Externally apply  topically daily       hydrocortisone (ANUSOL-HC) 2.5 % rectal cream Place rectally 3 times daily as needed        hypromellose (ARTIFICIAL TEARS) 0.5 % SOLN 1 drop 3 times daily as needed for dry eyes       insulin aspart (NOVOLOG FLEXPEN) 100 UNIT/ML injection Inject Subcutaneous 3 times daily (with meals) If Blood Sugar is 150 to 199, give 2 Units. If Blood Sugar is 200 to 249, give 4 Units. If Blood Sugar is 250 to 299, give 6 Units. If Blood Sugar is 300 to 349, give 8 Units. If Blood Sugar is greater than 349, give 10 Units. If Blood Sugar is greater than 349, call NP/PA.       insulin aspart (NOVOLOG FLEXPEN) 100 UNIT/ML injection Inject 7 Units Subcutaneous 2 times daily (with meals)        Insulin Glargine (BASAGLAR KWIKPEN SC) Inject 8 Units Subcutaneous 2 times daily        ketotifen (ZADITOR/REFRESH ANTI-ITCH) 0.025 % SOLN ophthalmic solution Place 1 drop into both eyes 2 times daily as needed for itching       levETIRAcetam (KEPPRA) 100 MG/ML solution Take 7.5 mLs (750 mg) by mouth every 12 hours       LISINOPRIL PO Take 30 mg by mouth daily        mupirocin (BACTROBAN) 2 % ointment Apply topically 2 times daily as needed (also bid unitl healed)       nitroglycerin (NITROSTAT) 0.4 MG SL tablet Place 1 tablet (0.4 mg) under the tongue every 5 minutes as needed 25 tablet      nystatin (MYCOSTATIN) 941624 UNIT/GM POWD Apply topically 2 times daily as needed        polyethylene glycol (MIRALAX/GLYCOLAX) powder Take 17 g by mouth daily as needed for constipation        QUEtiapine Fumarate (SEROQUEL PO) Take 12.5 mg by mouth daily midday       QUEtiapine Fumarate (SEROQUEL PO) Take 25 mg by mouth 2 times daily        sennosides (SENOKOT) 8.6 MG tablet Take 2 tablets by mouth 2 times daily        sorbitol 70 % SOLN solution Take 30 mLs by mouth daily as needed for constipation       traZODone (DESYREL) 50 MG tablet Take 1 tablet (50 mg) by mouth At Bedtime 31 tablet 12     trolamine salicylate (ASPERCREME) 10 %  "cream Apply topically 2 times daily as needed for moderate pain (also BID)        Venlafaxine HCl (EFFEXOR XR PO) Take 150 mg by mouth At Bedtime        Medications reviewed:  Medications reconciled to facility chart and changes were made to reflect current medications as identified as above med list. Below are the changes that were made:   Medications stopped since last EPIC medication reconciliation:   There are no discontinued medications.    Medications started since last [facility] medication reconciliation:  No orders of the defined types were placed in this encounter.        Case Management:  I have reviewed the care plan and MDS and do agree with the plan. Patient's desire to return to the community is present, but is not able due to care needs .  Information reviewed:  Medications, vital signs, orders, and nursing notes.    ROS:  Limited secondary to cognitive impairment but today pt reports no pain     Exam:  Vitals: /64  Pulse 64  Temp 97.6  F (36.4  C)  Resp 18  Ht 5' 2\" (1.575 m)  Wt 183 lb 12.8 oz (83.4 kg)  SpO2 95%  BMI 33.62 kg/m2  BMI= Body mass index is 33.62 kg/(m^2).  GENERAL APPEARANCE:  Alert, in no distress, anxious, obese  RESP:  respiratory effort and palpation of chest normal, lungs clear to auscultation , no respiratory distress  CV:  Palpation and auscultation of heart done , regular rate and rhythm, no murmur, rub, or gallop, peripheral edema 3+ in le  PSYCH:  insight and judgement impaired, memory impaired     Lab/Diagnostic data:     CBC RESULTS:   Recent Labs   Lab Test  03/02/18 0815  09/01/17   0750   WBC  5.5  4.6   RBC  3.95  3.71*   HGB  11.8  11.9   HCT  37.2  35.3   MCV  94  95   MCH  29.9  32.1   MCHC  31.7  33.7   RDW  12.6  12.5   PLT  142*  124*       Last Basic Metabolic Panel:  Recent Labs   Lab Test  03/02/18   0815  11/20/17   0735  09/01/17   0750  07/31/17   0720   NA   --   142   --   141   POTASSIUM   --   4.4   --   4.3   CHLORIDE   --   105   --   103 "   ELIZABETH   --   8.6   --   8.5   CO2   --   29   --   31   BUN  31*  28  25  29   CR  1.36*  1.37*  1.33*  1.40*   GLC   --   156*  135*  193*       ASSESSMENT/PLAN    1. Vascular dementia with behavioral disturbance - stable now that Seroquel restored- with hx of failed GDR recently and also in past hx would wait until conditions changes or at least 2 years before I would attempt again   2. Paranoia (H) - improved on med- cont Seroquel   3. Visual hallucinations - improved on med- cont Seroquel   4. HTN, goal below 140/90 - adequate control on meds- cont, monitor   5. Type 2 diabetes mellitus with complication, with long-term current use of insulin (H) - no lows reported, control adequate for age and comorbidity- cont insulin lantus bid and novolog with meals. Would recommend d/c SSI . Will discuss with NP   6. Moderate episode of recurrent major depressive disorder (H) - stable on meds- cont and monitor             Electronically signed by:  Nathan Lama MD

## 2018-04-26 NOTE — LETTER
4/26/2018        RE: Jayne Vasquez  [id number] [address] [place] [id number]          [place] [facility]    Chief Complaint   Patient presents with     [facility] Regulatory       HPI:    Jayne Vasquez is a 87 year old  (9/21/1930), who is being seen today for a federally mandated E/M visit at [facility] .  HPI information obtained from: facility chart records, facility staff, patient report and [facility] chart review. Today's concerns are:  1. Vascular dementia with behavioral disturbance - stable now that Seroquel restored   2. Paranoia (H) - improved on med   3. Visual hallucinations - improved on med   4. HTN, goal below 140/90 - adequate control on meds   5. Type 2 diabetes mellitus with complication, with long-term current use of insulin (H) - no lows reported, control adequate for age and comorbidities   6. Moderate episode of recurrent major depressive disorder (H) - stable on meds       ALLERGIES: Dye [contrast dye]; Fluoxetine; Iodine-131; Methocarbamol; Paroxetine; and Penicillins  PAST MEDICAL HISTORY:  has a past medical history of Anemia, unspecified (11/04/10); Aphasia (02/12/10); Cataracts; Cerebral embolism with cerebral infarction (H) (02/16/10); Chest pain (11/04/10); Confusion (01/30/10); Congestive heart failure, unspecified; Coronary atherosclerosis of unspecified type of vessel, native or graft (05/20/08); Diabetes mellitus (H); Diabetic eye exam (H) (3/26/15); Diabetic infection of left foot (2/26/2013); Diastolic dysfunction, left ventricle, grade I by Echo (4/2/2012); GERD (gastroesophageal reflux disease) (11/04/10); GI bleed; History of blood transfusion; History of recurrent UTIs; Hyperlipemia (4.22.11); Hypertension; Hyponatremia; Intermediate coronary syndrome (H); Labral tear of long head of biceps tendon; LVH (left ventricular hypertrophy) due to hypertensive disease - mild-moderate (4/2/2012); Open wound of left foot in 4th interdigital  space (2/26/2013); Osteoarthrosis, shoulder region (07/19/09); Rotator cuff tear; Seizure disorder, secondary (H) (02/16/10); Subacromial bursitis; Transient cerebral ischemia (3/30/2012); Unspecified cerebral artery occlusion with cerebral infarction; and Vitamin D deficiency (02/08/10). She also has no past medical history of Asthma; Malignant neoplasm (H); or Thyroid disease.  PAST SURGICAL HISTORY:  has a past surgical history that includes appendectomy; Cholecystectomy; Hysterectomy; surgical history of - ; colonoscopy; cataract iol, rt/lt; surgical history of - ; surgical history of -  (5/2008); surgical history of - ; surgical history of - ; colonoscopy (5/31/11); surgical history of -  (5/31/11); cardiac catherization (05/20/08); Esophagoscopy, gastroscopy, duodenoscopy (EGD), combined (4/5/2012); Bypass graft artery coronary (4/9/2012); and Phacoemulsification with standard intraocular lens implant (Right, 4/16/2015).  FAMILY HISTORY: family history includes C.A.D. in her father, mother, and son; CANCER in her mother and sister; CEREBROVASCULAR DISEASE in her sister; Neurologic Disorder in her son.  SOCIAL HISTORY:  reports that she has never smoked. She has never used smokeless tobacco. She reports that she does not drink alcohol or use illicit drugs.    MEDICATIONS:  Current Outpatient Prescriptions   Medication Sig Dispense Refill     Acetaminophen (TYLENOL PO) Take 650 mg by mouth 2 times daily Also TID PRN       amLODIPine (NORVASC) 10 MG tablet Take 1 tablet (10 mg) by mouth daily 90 tablet 3     ASPIRIN PO Take 81 mg by mouth daily       BusPIRone HCl (BUSPAR PO) Take 5 mg by mouth 3 times daily        Carvedilol (COREG PO) Take 25 mg by mouth 2 times daily (with meals)       Cranberry-Vitamin C-Inulin (UTI-STAT PO) Take by mouth daily       fluticasone (FLONASE) 50 MCG/ACT nasal spray Spray 1 spray into both nostrils At Bedtime       GABAPENTIN PO Take 600 mg by mouth every morning Also 900 QHS        Glycerin-Dimeth-Surfactants (ONE-STEP SKIN CARE LOTION) LOTN Externally apply topically daily       hydrocortisone (ANUSOL-HC) 2.5 % rectal cream Place rectally 3 times daily as needed        hypromellose (ARTIFICIAL TEARS) 0.5 % SOLN 1 drop 3 times daily as needed for dry eyes       insulin aspart (NOVOLOG FLEXPEN) 100 UNIT/ML injection Inject Subcutaneous 3 times daily (with meals) If Blood Sugar is 150 to 199, give 2 Units. If Blood Sugar is 200 to 249, give 4 Units. If Blood Sugar is 250 to 299, give 6 Units. If Blood Sugar is 300 to 349, give 8 Units. If Blood Sugar is greater than 349, give 10 Units. If Blood Sugar is greater than 349, call NP/PA.       insulin aspart (NOVOLOG FLEXPEN) 100 UNIT/ML injection Inject 7 Units Subcutaneous 2 times daily (with meals)        Insulin Glargine (BASAGLAR KWIKPEN SC) Inject 8 Units Subcutaneous 2 times daily        ketotifen (ZADITOR/REFRESH ANTI-ITCH) 0.025 % SOLN ophthalmic solution Place 1 drop into both eyes 2 times daily as needed for itching       levETIRAcetam (KEPPRA) 100 MG/ML solution Take 7.5 mLs (750 mg) by mouth every 12 hours       LISINOPRIL PO Take 30 mg by mouth daily        mupirocin (BACTROBAN) 2 % ointment Apply topically 2 times daily as needed (also bid unitl healed)       nitroglycerin (NITROSTAT) 0.4 MG SL tablet Place 1 tablet (0.4 mg) under the tongue every 5 minutes as needed 25 tablet      nystatin (MYCOSTATIN) 518566 UNIT/GM POWD Apply topically 2 times daily as needed        polyethylene glycol (MIRALAX/GLYCOLAX) powder Take 17 g by mouth daily as needed for constipation        QUEtiapine Fumarate (SEROQUEL PO) Take 12.5 mg by mouth daily midday       QUEtiapine Fumarate (SEROQUEL PO) Take 25 mg by mouth 2 times daily        sennosides (SENOKOT) 8.6 MG tablet Take 2 tablets by mouth 2 times daily        sorbitol 70 % SOLN solution Take 30 mLs by mouth daily as needed for constipation       traZODone (DESYREL) 50 MG tablet Take 1 tablet  "(50 mg) by mouth At Bedtime 31 tablet 12     trolamine salicylate (ASPERCREME) 10 % cream Apply topically 2 times daily as needed for moderate pain (also BID)        Venlafaxine HCl (EFFEXOR XR PO) Take 150 mg by mouth At Bedtime        Medications reviewed:  Medications reconciled to facility chart and changes were made to reflect current medications as identified as above med list. Below are the changes that were made:   Medications stopped since last EPIC medication reconciliation:   There are no discontinued medications.    Medications started since last [facility] medication reconciliation:  No orders of the defined types were placed in this encounter.        Case Management:  I have reviewed the care plan and MDS and do agree with the plan. Patient's desire to return to the community is present, but is not able due to care needs .  Information reviewed:  Medications, vital signs, orders, and nursing notes.    ROS:  Limited secondary to cognitive impairment but today pt reports no pain     Exam:  Vitals: /64  Pulse 64  Temp 97.6  F (36.4  C)  Resp 18  Ht 5' 2\" (1.575 m)  Wt 183 lb 12.8 oz (83.4 kg)  SpO2 95%  BMI 33.62 kg/m2  BMI= Body mass index is 33.62 kg/(m^2).  GENERAL APPEARANCE:  Alert, in no distress, anxious, obese  RESP:  respiratory effort and palpation of chest normal, lungs clear to auscultation , no respiratory distress  CV:  Palpation and auscultation of heart done , regular rate and rhythm, no murmur, rub, or gallop, peripheral edema 3+ in le  PSYCH:  insight and judgement impaired, memory impaired     Lab/Diagnostic data:     CBC RESULTS:   Recent Labs   Lab Test  03/02/18 0815 09/01/17   0750   WBC  5.5  4.6   RBC  3.95  3.71*   HGB  11.8  11.9   HCT  37.2  35.3   MCV  94  95   MCH  29.9  32.1   MCHC  31.7  33.7   RDW  12.6  12.5   PLT  142*  124*       Last Basic Metabolic Panel:  Recent Labs   Lab Test  03/02/18   0815  11/20/17   0735  09/01/17   0750  07/31/17   0720   NA   --   " 142   --   141   POTASSIUM   --   4.4   --   4.3   CHLORIDE   --   105   --   103   ELIZABETH   --   8.6   --   8.5   CO2   --   29   --   31   BUN  31*  28  25  29   CR  1.36*  1.37*  1.33*  1.40*   GLC   --   156*  135*  193*       ASSESSMENT/PLAN    1. Vascular dementia with behavioral disturbance - stable now that Seroquel restored- with hx of failed GDR recently and also in past hx would wait until conditions changes or at least 2 years before I would attempt again   2. Paranoia (H) - improved on med- cont Seroquel   3. Visual hallucinations - improved on med- cont Seroquel   4. HTN, goal below 140/90 - adequate control on meds- cont, monitor   5. Type 2 diabetes mellitus with complication, with long-term current use of insulin (H) - no lows reported, control adequate for age and comorbidity- cont insulin lantus bid and novolog with meals. Would recommend d/c SSI . Will discuss with NP   6. Moderate episode of recurrent major depressive disorder (H) - stable on meds- cont and monitor             Electronically signed by:  Nathan Lama MD        Sincerely,        Nathan Lama MD

## 2018-04-26 NOTE — Clinical Note
See note, ok to not try GDR for a while unless condition changes. Would also look to d/c SSI but continue novolog scheduled with meals based on current recommendations for diabetic management in LTC

## 2018-05-24 NOTE — LETTER
5/24/2018        RE: Jayne Vasquez  [address] [address] [place] [address]        [facility]    Chief Complaint   Patient presents with     Nursing Home Acute       HPI:    Jayne Vasquez is a 87 year old  (9/21/1930), who is being seen today for an episodic care visit at [facility].    HPI information obtained from: facility chart records, facility staff, [facility] chart review and family/first contact pt's son, Zion's report. Today's concern is:  Type 2 diabetes mellitus with complication, with long-term current use of insulin (H)  Patient on 8 units BID basaglar, Novolog 7 units with lunch and dinner, Novolog SSI.    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  On ASA 81 mg daily for ppx  On levitiracetam 750 mg BID for seizures (last level: 33)  No seizures since (by report)      REVIEW OF SYSTEMS:  Unobtainable secondary to cognitive impairment.   Past medical and surgical history reviewed.     /89  Pulse 78  Temp 96.6  F (35.9  C)  Resp 18  Wt 192 lb (87.1 kg)  SpO2 94%  BMI 35.12 kg/m2  GENERAL APPEARANCE:  Alert, in no distress, pleasantly confused  RESP:  respiratory effort normal, no respiratory distress  CV:  No LE peripheral edema  ABDOMEN:  Obese, nondistended  M/S:   Gait and station with W/C mobility, Digits and nails with arthritic changes, reduced muscle mass  SKIN:  Inspection and Palpation of skin and subcutaneous tissue  pale, dry, fragile  PSYCH:  insight and judgement, memory with severe impairment , affect and mood normal, does not follow commands readily           ASSESSMENT/PLAN:  Type 2 diabetes mellitus with complication, with long-term current use of insulin (H)  Variable eating patterns making insulin dosing difficult.   AM blood glucose target >125 so will need to decrease basaglar.   Will increase Novolog SSI threshold to allow for possible even dosing needs and monitor usage so scheduled Novolog can be monitored.   Will monitor closely.    Will check A1C.     Convulsions, unspecified convulsion type (H)  Hx of Nontraumatic hemorrhage of cerebral hemisphere, unspecified laterality (H)  No seizures since initial CVA so spoke with family about reduction of Keppra to 500 mg BID as Keppra can contribute to CNS S/Es such as psychosis, anxiety/agitation.   Family reluctant but agreeable; will make change with close monitoring.       Orders:  1.  Decrease Keppra to 500 mg po BID.  Dx: siezures  2.  Decrease Basaglar to 6 units sq BID.  Dx: DM2  3.  Change SSI Novolo-249: give 2 units   250-299: give 4 units   300-349: give 6 units   350-400: give 8 units   > 401: give 10 units & notify NP.  Dx: DM2  4.  Hgb A1C on Friday, 18.  Dx: DM2      Electronically signed by:  LORI Ricks CNP      Sincerely,        LORI Ricks CNP

## 2018-05-24 NOTE — PROGRESS NOTES
[address]  [facility] [address]  [phone]  Dept: [phone]    PRE-OP EVALUATION:  Today's date: 2018    Jacqueline Bolden (: 1989) presents for pre-operative evaluation assessment as requested by Dr. Lundberg.  She requires evaluation and anesthesia risk assessment prior to undergoing surgery/procedure for treatment of uterine polyp.  Proposed procedure: removal of uterine polyp     Date of Surgery/ Procedure: 18  Time of Surgery/ Procedure: 3pm  Hospital/Surgical Facility: [facility]   Fax number for surgical facility:   Primary Physician: Jhoana Wen  Type of Anesthesia Anticipated: to be determined    Patient has a Health Care Directive or Living Will:  NO,      Preop Questions 2018   1.  Do you have a history of heart attack, stroke, stent, bypass or surgery on an artery in the head, neck, heart or legs? No   2.  Do you ever have any pain or discomfort in your chest? No   3.  Do you have a history of  Heart Failure? No   4.   Are you troubled by shortness of breath when:  walking on a level surface, or up a slight hill, or at night? No   5.  Do you currently have a cold, bronchitis or other respiratory infection? No   6.  Do you have a cough, shortness of breath, or wheezing? No   7.  Do you sometimes get pains in the calves of your legs when you walk? No   8. Do you or anyone in your family have previous history of blood clots? No   9.  Do you or does anyone in your family have a serious bleeding problem such as prolonged bleeding following surgeries or cuts? No   10. Have you ever had problems with anemia or been told to take iron pills? YES - not currently taking iron but is often anemic    11. Have you had any abnormal blood loss such as black, tarry or bloody stools, or abnormal vaginal bleeding? No   12. Have you ever had a blood transfusion? No   13. Have you or any of your relatives ever had problems with  [facility]    Chief Complaint   Patient presents with     Nursing Home Acute       HPI:    Jayne Vasquez is a 87 year old  (9/21/1930), who is being seen today for an episodic care visit at [facility].    HPI information obtained from: facility chart records, facility staff, [facility] chart review and family/first contact pt's son, Zion's report. Today's concern is:  Type 2 diabetes mellitus with complication, with long-term current use of insulin (H)  Patient on 8 units BID basaglar, Novolog 7 units with lunch and dinner, Novolog SSI.    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  On ASA 81 mg daily for ppx  On levitiracetam 750 mg BID for seizures (last level: 33)  No seizures since (by report)      REVIEW OF SYSTEMS:  Unobtainable secondary to cognitive impairment.   Past medical and surgical history reviewed.     /89  Pulse 78  Temp 96.6  F (35.9  C)  Resp 18  Wt 192 lb (87.1 kg)  SpO2 94%  BMI 35.12 kg/m2  GENERAL APPEARANCE:  Alert, in no distress, pleasantly confused  RESP:  respiratory effort normal, no respiratory distress  CV:  No LE peripheral edema  ABDOMEN:  Obese, nondistended  M/S:   Gait and station with W/C mobility, Digits and nails with arthritic changes, reduced muscle mass  SKIN:  Inspection and Palpation of skin and subcutaneous tissue pale, dry, fragile  PSYCH:  insight and judgement, memory with severe impairment ,  affect and mood normal, does not follow commands readily           ASSESSMENT/PLAN:  Type 2 diabetes mellitus with complication, with long-term current use of insulin (H)  Variable eating patterns making insulin dosing difficult.   AM blood glucose target >125 so will need to decrease basaglar.   Will increase Novolog SSI threshold to allow for possible even dosing needs and monitor usage so scheduled Novolog can be monitored.   Will monitor closely.    Will check A1C.     Convulsions, unspecified convulsion type (H)  Hx of Nontraumatic hemorrhage of cerebral hemisphere, unspecified laterality (H)  No seizures since initial CVA so spoke with family about reduction of Keppra to 500 mg BID as Keppra can contribute to CNS S/Es such as psychosis, anxiety/agitation.   Family reluctant but agreeable; will make change with close monitoring.       Orders:  1.  Decrease Keppra to 500 mg po BID.  Dx: siezures  2.  Decrease Basaglar to 6 units sq BID.  Dx: DM2  3.  Change SSI Novolo-249: give 2 units   250-299: give 4 units   300-349: give 6 units   350-400: give 8 units   > 401: give 10 units & notify NP.  Dx: DM2  4.  Hgb A1C on Friday, 18.  Dx: DM2      Electronically signed by:  LORI Ricks CNP   anesthesia? No   14. Do you have sleep apnea, excessive snoring or daytime drowsiness? No   15. Do you have any prosthetic heart valves? No   16. Do you have prosthetic joints? No   17. Is there any chance that you may be pregnant? No           HPI:                                                      Brief HPI related to upcoming procedure: uterine polyp      See problem list for active medical problems.  Problems all longstanding and stable, except as noted/documented.  See ROS for pertinent symptoms related to these conditions.                                                                                                  .    MEDICAL HISTORY:                                                    Patient Active Problem List    Diagnosis Date Noted     CARDIOVASCULAR SCREENING; LDL GOAL LESS THAN 160 10/31/2010     Priority: Medium     Tobacco use disorder 04/11/2007     Priority: Medium     Dysmenorrhea 12/01/2005     Priority: Medium      Past Medical History:   Diagnosis Date     Dysmenorrhea      Irregular menstrual cycle      Surveillance of previously prescribed intrauterine contraceptive device 12/2008    paraguard     Past Surgical History:   Procedure Laterality Date     NO HISTORY OF SURGERY       Current Outpatient Prescriptions   Medication Sig Dispense Refill     drospirenone-ethinyl estradiol (MAGDALENA) 3-0.03 MG per tablet Take 1 tablet by mouth daily Skip placebo week and start a new pack immediately. 30 tablet 1     OTC products: None, except as noted above and no recent use of OTC ASA, NSAIDS or Steroids    Allergies   Allergen Reactions     No Known Drug Allergies       Latex Allergy: NO    Social History   Substance Use Topics     Smoking status: Former Smoker     Packs/day: 1.00     Types: Cigarettes     Smokeless tobacco: Never Used      Comment:  1 pack every 5 days     Alcohol use Yes      Comment: OCC.     History   Drug Use No     Not current tobacco user quit 2014.     REVIEW OF SYSTEMS:      "                                               Constitutional, neuro, ENT, endocrine, pulmonary, cardiac, gastrointestinal, genitourinary, musculoskeletal, integument and psychiatric systems are negative, except as otherwise noted.      EXAM:                                                    /70 (BP Location: Right arm, Patient Position: Chair, Cuff Size: Adult Regular)  Pulse 70  Temp 98.5  F (36.9  C) (Oral)  Resp 16  Ht 5' 1.81\" (1.57 m)  Wt 166 lb (75.3 kg)  LMP 10/24/2017 (Approximate)  BMI 30.55 kg/m2    GENERAL APPEARANCE: healthy, alert and no distress     EYES: EOMI, PERRL     HENT: ear canals and TM's normal and nose and mouth without ulcers or lesions     NECK: no adenopathy, no asymmetry, masses, or scars and thyroid normal to palpation     RESP: lungs clear to auscultation - no rales, rhonchi or wheezes     CV: regular rates and rhythm, normal S1 S2, no S3 or S4 and no murmur, click or rub     ABDOMEN:  soft, nontender, no HSM or masses and bowel sounds normal     MS: extremities normal- no gross deformities noted, no evidence of inflammation in joints, FROM in all extremities.     SKIN: no suspicious lesions or rashes     NEURO: Normal strength and tone, sensory exam grossly normal, mentation intact and speech normal     PSYCH: mentation appears normal. and affect normal/bright     LYMPHATICS: No axillary, cervical, or supraclavicular nodes    DIAGNOSTICS:                                                    No labs or EKG required for low risk surgery (cataract, skin procedure, breast biopsy, etc)    Recent Labs   Lab Test  12/22/09   1509   HGB  12.4   PLT  347        IMPRESSION:                                                    Reason for surgery/procedure: uterine polyp  Diagnosis/reason for consult: medical clearance for anesthesia    The proposed surgical procedure is considered LOW risk.    REVISED CARDIAC RISK INDEX  The patient has the following serious cardiovascular risks for " perioperative complications such as (MI, PE, VFib and 3  AV Block):  No serious cardiac risks  INTERPRETATION: 0 risks: Class I (very low risk - 0.4% complication rate)    The patient has the following additional risks for perioperative complications:  No identified additional risks      ICD-10-CM    1. Preop general physical exam Z01.818    2. Uterine polyp N84.0        RECOMMENDATIONS:                                                      --Patient is to take all scheduled medications on the day of surgery EXCEPT for modifications listed below.    APPROVAL GIVEN to proceed with proposed procedure, without further diagnostic evaluation       Signed Electronically by: PERCY Reich CNP    Copy of this evaluation report is provided to requesting physician.    [facility] Preop Guidelines

## 2018-05-29 NOTE — PROGRESS NOTES
[facility]    Chief Complaint   Patient presents with     Nursing Home Acute       HPI:    Jayne Vasquez is a 87 year old  (9/21/1930), who is being seen today for an episodic care visit at [facility].    HPI information obtained from: facility chart records, facility staff and [facility] chart review. 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  Patient has history of Grade I diastolic dysfunction - not on diuretics.  She also has significant cardiac history (see above/diagnoses) and is on amlodipine 10 mg qPM, ASA 81 mg daily, carvedilol 25 gm BID, lisinopril 40 mg daily, nitro PRN - no usage  6/2015 ECHO with EF 60-65%, + LVH    On-call ordered CHEST XRAY and Lasix 20 mg BID x 4 doses (5/27-5/29), PRN O2.    CHEST XRAY:    BL Creat seemingly 1.3-1.4  Last 3 BMPs:   11/20/17: BUN 28, Creat 1.37, GFR 36  3/2/18: BUN 31, Creat 1.36, GFR 37  5/29/18: BUN 30, Creat 1.47, GFR 34    Patient has history of dementia so is a poor historian of reports of SOB/CP/etc.     Today she is met in her room at the SNF and her two sons are present.  She is on 3 LPM O2, denies SOB (may be a poor historian).  She does not appear in respiratory distress.        REVIEW OF SYSTEMS:  Unobtainable secondary to cognitive impairment.     Past medical and surgical history reviewed.   Medications  reviewed    BP 95/48  Pulse 64  Temp 96.1  F (35.6  C)  Resp 18  Wt 192 lb (87.1 kg)  SpO2 93%  BMI 35.12 kg/m2  GENERAL APPEARANCE:  Alert, in no distress, confused per baseline  RESP:  respiratory effort and palpation of chest normal, auscultation of lungs clear, no respiratory distress, on 3 LPM O2  CV:  Palpation and auscultation of heart done , rate and rhythm regular, no murmur, no-scant LE edema  ABDOMEN:  Obese, normal bowel sounds, soft, nontender,   M/S:   Gait and station with W/C for mobiliy, Digits and nails with arthritic changes, reduced muscle mass  SKIN:  Inspection and Palpation of skin and subcutaneous tissue pale, intact  PSYCH:  insight and judgement, memory with severe impairment, affect and mood per her normal, does not follow commands readily         ASSESSMENT/PLAN:     Diastolic dysfunction, left ventricle  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  Will ask for daily weights.  Will ask for Add-on BNP to assess heart failure exacerbation.     Patient seemingly still in need of O2 but does not appear in respiratory distress or uncomfortable with her breathing.  She is slightly more deconditioned but not in distress.  Nursing asking if this afternoon's dose of Lasix should be given (as this may be 5th dose as first dose may have been given afternoon of 5/27) - verbal order to yes, please administer to patient.   LS mostly clear (slightly diminished) and edema minimal.   Will trend  weights and monitor for titration needs.     Kidney function appearing only slightly worse than usual, likely from venous congestion.  Will monitor.       Update**  BNP returned today at 7289.   Will order additional diuresis which can start tomorrow, for goal of titrating O2 to off.        Orders:  1.  Daily weights x 7 days, in AM before breakfast, after AM void.  2.  BNP add-on today please.  Dx: CHF  3.  Titrate 02 to as low as possible to keep sats > 88%.  Dx: CHF    Updated Orders:  4. Lasix 40 mg po qAM and 20 mg q 1400 on Wed, 5/30/18 and Thurs, 5/31/18.  Dx: CHF  5. BMP on Friday,  Dx: CHF    Electronically signed by:  LORI Ricks CNP

## 2018-05-29 NOTE — LETTER
5/29/2018        RE: Jayne Vasquez  [address] [address] [place] [address]        [facility]    Chief Complaint   Patient presents with     Nursing Home Acute       HPI:    Jayne Vasquez is a 87 year old  (9/21/1930), who is being seen today for an episodic care visit at [facility].    HPI information obtained from: facility chart records, facility staff and [facility] chart review. 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  Patient has history of Grade I diastolic dysfunction - not on diuretics.  She also has significant cardiac history (see above/diagnoses) and is on amlodipine 10 mg qPM, ASA 81 mg daily, carvedilol 25 gm BID, lisinopril 40 mg daily, nitro PRN - no usage  6/2015 ECHO with EF 60-65%, + LVH    On-call ordered CHEST XRAY and Lasix 20 mg BID x 4 doses (5/27-5/29), PRN O2.    CHEST XRAY:    BL Creat seemingly 1.3-1.4  Last 3 BMPs:   11/20/17: BUN 28, Creat 1.37, GFR 36  3/2/18: BUN 31, Creat 1.36, GFR 37  5/29/18: BUN 30, Creat 1.47, GFR 34    Patient has history of dementia so is a poor historian of reports of SOB/CP/etc.     Today she is met in her room at the SNF and her two sons are present.  She is on 3 LPM O2, denies SOB (may be a poor historian).  She does not appear in respiratory distress.        REVIEW OF SYSTEMS:  Unobtainable secondary to  cognitive impairment.     Past medical and surgical history reviewed.   Medications reviewed    BP 95/48  Pulse 64  Temp 96.1  F (35.6  C)  Resp 18  Wt 192 lb (87.1 kg)  SpO2 93%  BMI 35.12 kg/m2  GENERAL APPEARANCE:  Alert, in no distress, confused per baseline  RESP:  respiratory effort and palpation of chest normal, auscultation of lungs clear, no respiratory distress, on 3 LPM O2  CV:  Palpation and auscultation of heart done , rate and rhythm regular, no murmur, no-scant LE edema  ABDOMEN:  Obese, normal bowel sounds, soft, nontender,   M/S:   Gait and station with W/C for mobiliy, Digits and nails with arthritic changes, reduced muscle mass  SKIN:  Inspection and Palpation of skin and subcutaneous tissue pale, intact  PSYCH:  insight and judgement, memory with severe impairment, affect and mood per her normal, does not follow commands readily         ASSESSMENT/PLAN:     Diastolic dysfunction, left ventricle  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  Will ask for daily weights.  Will ask for Add-on BNP to assess heart failure exacerbation.     Patient seemingly still in need of O2 but does not appear in respiratory distress or uncomfortable with her breathing.  She is slightly more deconditioned but not in distress.  Nursing asking if this afternoon's dose of Lasix should be given (as this may be 5th dose as first dose may have been given afternoon of 5/27) - verbal order to yes, please administer  to patient.   LS mostly clear (slightly diminished) and edema minimal.   Will trend weights and monitor for titration needs.     Kidney function appearing only slightly worse than usual, likely from venous congestion.  Will monitor.       Update**  BNP returned today at 7289.   Will order additional diuresis which can start tomorrow, for goal of titrating O2 to off.        Orders:  1.  Daily weights x 7 days, in AM before breakfast, after AM void.  2.  BNP add-on today please.  Dx: CHF  3.  Titrate 02 to as low as possible to keep sats > 88%.  Dx: CHF    Updated Orders:  4. Lasix 40 mg po qAM and 20 mg q 1400 on Wed, 5/30/18 and Thurs, 5/31/18.  Dx: CHF  5. BMP on Friday,  Dx: CHF    Electronically signed by:  LORI Ricks CNP      Sincerely,        LORI Ricks CNP

## 2018-06-01 NOTE — PROGRESS NOTES
[facility]    Chief Complaint   Patient presents with     jail Acute       [facility] Medical Record Number:  [id number]    HPI:    Jayne Vasquez is a 87 year old  (9/21/1930), who is being seen today for an episodic care visit at [facility] .  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)  Coronary artery disease involving native coronary artery of native heart without angina pectoris  LVH (left ventricular hypertrophy) due to hypertensive disease, with heart failure (H)  Old myocardial infarction  Mitral valve insufficiency, unspecified etiology  Aortic stenosis, mild  Hyperlipidemia LDL goal <100  CKD (chronic kidney disease) stage 3, GFR 30-59 ml/min     Patient is on amlodipine 10 mg qPM, ASA 81 mg daily, carvedilol 25 gm BID, lisinopril 40 mg daily, nitro PRN - no usage  6/2015 ECHO with EF 60-65%, + LVH    Patient has a significant cardiac history, recently treated for CHF exacerbation with Lasix bolusing and O2 application.  (see previous notes for specific details)    5/29/18 patient bolused with Lasix 40 mg qAM, 20 mg q afternoon x 2 days.  Order now is for Lasix 40 mg qAM; she was previously not on any diuretics.      Weights:  5/1/18 - 192 lbs  5/30/18 - 206 lbs  5/31/18 - 194.6 lbs  6/1/18 - not done by lunchtime yet so likely won't be very relevant      BPs have been 90s/48-54  HRs 64-85    Sats have been 88-90% on 1.5-3 LPM, today she is on 1.5 LPM.     BL Creat seemingly 1.3-1.4  Last 3 BMPs:   11/20/17: BUN 28, Creat 1.37, GFR 36  3/2/18: BUN 31, Creat 1.36, GFR 37  5/29/18: BUN 30, Creat 1.47, GFR 34  6/1/18: BUN 29, Creat 1.32, GFR 38      ALLERGIES: Dye [contrast dye]; Fluoxetine; Iodine-131; Methocarbamol; Paroxetine; and Penicillins  Past Medical,  Surgical, Family and Social History reviewed and [facility] in [facility].    Current Outpatient Prescriptions   Medication Sig Dispense Refill     Acetaminophen (TYLENOL PO) Take 650 mg by mouth 2 times daily Also TID PRN       amLODIPine (NORVASC) 10 MG tablet Take 1 tablet (10 mg) by mouth daily 90 tablet 3     ASPIRIN PO Take 81 mg by mouth daily       BusPIRone HCl (BUSPAR PO) Take 5 mg by mouth 3 times daily        Carvedilol (COREG PO) Take 25 mg by mouth 2 times daily (with meals)       Cranberry-Vitamin C-Inulin (UTI-STAT PO) Take by mouth daily       fluticasone (FLONASE) 50 MCG/ACT nasal spray Spray 1 spray into both nostrils At Bedtime       Furosemide (LASIX PO) Take 20 mg by mouth daily       GABAPENTIN PO Take 600 mg by mouth every morning Also 900 QHS       Glycerin-Dimeth-Surfactants (ONE-STEP SKIN CARE LOTION) LOTN Externally apply topically daily       hydrocortisone (ANUSOL-HC) 2.5 % rectal cream Place rectally 3 times daily as needed        hypromellose (ARTIFICIAL TEARS) 0.5 % SOLN 1 drop 3 times daily as needed for dry eyes       insulin aspart (NOVOLOG FLEXPEN) 100 UNIT/ML injection Inject 7 Units Subcutaneous 2 times daily (with meals)        insulin aspart (NOVOLOG FLEXPEN) 100 UNIT/ML injection Inject Subcutaneous 3 times daily (with meals) If blood sugar is:  200-249: give 2 units  250-299: give 4 units  300-349: give 6 units  350-400: give 8 units  > 401: give 10 units & notify NP.       Insulin Glargine (BASAGLAR KWIKPEN SC) Inject 6 Units Subcutaneous 2 times daily        ketotifen (ZADITOR/REFRESH ANTI-ITCH) 0.025 % SOLN ophthalmic solution Place 1 drop into both eyes 2 times daily as needed for itching       LevETIRAcetam (KEPPRA PO) Take 500 mg by mouth 2 times daily       LISINOPRIL PO Take 30 mg by mouth daily        mupirocin (BACTROBAN) 2 % ointment Apply topically 2 times daily as needed (also bid unitl healed)       nitroglycerin (NITROSTAT) 0.4 MG SL tablet Place 1 tablet (0.4 mg)  under the tongue every 5 minutes as needed 25 tablet      nystatin (MYCOSTATIN) 240886 UNIT/GM POWD Apply topically 2 times daily as needed        polyethylene glycol (MIRALAX/GLYCOLAX) powder Take 17 g by mouth daily as needed for constipation        QUEtiapine Fumarate (SEROQUEL PO) Take 25 mg by mouth 2 times daily        QUEtiapine Fumarate (SEROQUEL PO) Take 12.5 mg by mouth daily midday       sennosides (SENOKOT) 8.6 MG tablet Take 2 tablets by mouth 2 times daily        sorbitol 70 % SOLN solution Take 30 mLs by mouth daily as needed for constipation       traZODone (DESYREL) 50 MG tablet Take 1 tablet (50 mg) by mouth At Bedtime 31 tablet 12     trolamine salicylate (ASPERCREME) 10 % cream Apply topically 2 times daily as needed for moderate pain (also BID)        Venlafaxine HCl (EFFEXOR XR PO) Take 150 mg by mouth At Bedtime        Medications reviewed:  Medications reconciled to facility chart and changes were made to reflect current medications as identified as above med list. Below are the changes that were made:   Medications stopped since last EPIC medication reconciliation:   There are no discontinued medications.    Medications started since last [facility] medication reconciliation:  No orders of the defined types were placed in this encounter.    REVIEW OF SYSTEMS:  Unobtainable secondary to cognitive impairment.     Physical Exam:  BP 95/48  Pulse 64  Temp 96.1  F (35.6  C)  Resp 18  Wt 194 lb 9.6 oz (88.3 kg)  SpO2 (!) 88%  BMI 35.59 kg/m2  GENERAL APPEARANCE:  Alert, in no distress, confused  RESP:  respiratory effort and palpation of chest normal, auscultation of lungs clear, diminished unless instructed to take large breath, no respiratory distress, on 1.5 LPM O2 via NC  CV:  Palpation and auscultation of heart done , rate and rhythm regular, no murmur, no-scant LE peripheral edema  ABDOMEN:  Obese, normal bowel sounds, soft, nontender, no hepatosplenomegaly or other masses but assessment  limited by body habitus.   M/S:   Gait and station with W/C mobility, Digits and nails with arthritic changes, reduced muscle mass  SKIN:  Inspection and Palpation of skin and subcutaneous tissue pale, fragile, seemingly intact  PSYCH:  insight and judgement, memory with severe impairment, affect and mood normal, does not follow commands readily         Recent Labs:     CBC RESULTS:   Recent Labs   Lab Test  05/25/18   0745  03/02/18   0815  09/01/17   0750   WBC   --   5.5  4.6   RBC   --   3.95  3.71*   HGB  10.2*  11.8  11.9   HCT   --   37.2  35.3   MCV   --   94  95   MCH   --   29.9  32.1   MCHC   --   31.7  33.7   RDW   --   12.6  12.5   PLT   --   142*  124*       Last Basic Metabolic Panel:  Recent Labs   Lab Test  06/01/18   0805  05/29/18   1009   NA  142  141   POTASSIUM  4.4  5.0   CHLORIDE  102  106   ELIZABETH  8.5  8.2*   CO2  35*  28   BUN  29  30   CR  1.32*  1.47*   GLC  96  208*         Lab Results   Component Value Date    A1C 5.9 05/25/2018    A1C 7.7 12/27/2017         Assessment/Plan:     Diastolic dysfunction, left ventricle  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  She does have COPD so 88-90% is likely adequate for her.  Nursing to please titrate O2 accordingly.     Will change her daily Lasix to 20 mg and f/u in a few days to assess to determine need.  BPs on lower side so if Lasix is needed for daily use long-term, should look to decrease her ACEI or  CCB.      Will monitor closely and will f/u in a few days.     Orders:  1.  Decrease Lasix to 20 mg po Q am.  Dx: CHF  2.  BMP 6/8/18.  Dx: CHF  3.  Daily weights x 7 days, in AM before breakfast, after void.  Dx: CHF    Electronically signed by  LORI Ricks CNP

## 2018-06-01 NOTE — LETTER
6/1/2018        RE: Jayne Vasquez  [address] [address] [place] [id number]        [facility]    Chief Complaint   Patient presents with     shelter Acute       [facility] Medical Record Number:  [id number]    HPI:    Jayne Vasquez is a 87 year old  (9/21/1930), who is being seen today for an episodic care visit at [facility] .  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)  Coronary artery disease involving native coronary artery of native heart without angina pectoris  LVH (left ventricular hypertrophy) due to hypertensive disease, with heart failure (H)  Old myocardial infarction  Mitral valve insufficiency, unspecified etiology  Aortic stenosis, mild  Hyperlipidemia LDL goal <100  CKD (chronic kidney disease) stage 3, GFR 30-59 ml/min     Patient is on amlodipine 10 mg qPM, ASA 81 mg daily, carvedilol 25 gm BID, lisinopril 40 mg daily, nitro PRN - no usage  6/2015 ECHO with EF 60-65%, + LVH    Patient has a significant cardiac history, recently treated for CHF exacerbation with Lasix bolusing and O2 application.  (see previous notes for specific details)    5/29/18 patient bolused with Lasix 40 mg qAM, 20 mg q afternoon x 2 days.  Order now is for Lasix 40 mg qAM; she was previously not on any diuretics.      Weights:  5/1/18 - 192 lbs  5/30/18 - 206 lbs  5/31/18 - 194.6 lbs  6/1/18 - not done by lunchtime yet so likely won't be very relevant      BPs have been 90s/48-54  HRs 64-85    Sats have been 88-90% on 1.5-3 LPM, today she is on 1.5 LPM.     BL Creat seemingly 1.3-1.4  Last 3 BMPs:   11/20/17: BUN 28, Creat 1.37, GFR 36  3/2/18: BUN 31, Creat 1.36, GFR 37  5/29/18: BUN 30, Creat 1.47, GFR 34  6/1/18: BUN 29, Creat 1.32, GFR 38      ALLERGIES: Dye [contrast dye];  Fluoxetine; Iodine-131; Methocarbamol; Paroxetine; and Penicillins  Past Medical, Surgical, Family and Social History reviewed and updated in EPIC.    Current Outpatient Prescriptions   Medication Sig Dispense Refill     Acetaminophen (TYLENOL PO) Take 650 mg by mouth 2 times daily Also TID PRN       amLODIPine (NORVASC) 10 MG tablet Take 1 tablet (10 mg) by mouth daily 90 tablet 3     ASPIRIN PO Take 81 mg by mouth daily       BusPIRone HCl (BUSPAR PO) Take 5 mg by mouth 3 times daily        Carvedilol (COREG PO) Take 25 mg by mouth 2 times daily (with meals)       Cranberry-Vitamin C-Inulin (UTI-STAT PO) Take by mouth daily       fluticasone (FLONASE) 50 MCG/ACT nasal spray Spray 1 spray into both nostrils At Bedtime       Furosemide (LASIX PO) Take 20 mg by mouth daily       GABAPENTIN PO Take 600 mg by mouth every morning Also 900 QHS       Glycerin-Dimeth-Surfactants (ONE-STEP SKIN CARE LOTION) LOTN Externally apply topically daily       hydrocortisone (ANUSOL-HC) 2.5 % rectal cream Place rectally 3 times daily as needed        hypromellose (ARTIFICIAL TEARS) 0.5 % SOLN 1 drop 3 times daily as needed for dry eyes       insulin aspart (NOVOLOG FLEXPEN) 100 UNIT/ML injection Inject 7 Units Subcutaneous 2 times daily (with meals)        insulin aspart (NOVOLOG FLEXPEN) 100 UNIT/ML injection Inject Subcutaneous 3 times daily (with meals) If blood sugar is:  200-249: give 2 units  250-299: give 4 units  300-349: give 6 units  350-400: give 8 units  > 401: give 10 units & notify NP.       Insulin Glargine (BASAGLAR KWIKPEN SC) Inject 6 Units Subcutaneous 2 times daily        ketotifen (ZADITOR/REFRESH ANTI-ITCH) 0.025 % SOLN ophthalmic solution Place 1 drop into both eyes 2 times daily as needed for itching       LevETIRAcetam (KEPPRA PO) Take 500 mg by mouth 2 times daily       LISINOPRIL PO Take 30 mg by mouth daily        mupirocin (BACTROBAN) 2 % ointment Apply topically 2 times daily as needed (also bid unitl  healed)       nitroglycerin (NITROSTAT) 0.4 MG SL tablet Place 1 tablet (0.4 mg) under the tongue every 5 minutes as needed 25 tablet      nystatin (MYCOSTATIN) 788014 UNIT/GM POWD Apply topically 2 times daily as needed        polyethylene glycol (MIRALAX/GLYCOLAX) powder Take 17 g by mouth daily as needed for constipation        QUEtiapine Fumarate (SEROQUEL PO) Take 25 mg by mouth 2 times daily        QUEtiapine Fumarate (SEROQUEL PO) Take 12.5 mg by mouth daily midday       sennosides (SENOKOT) 8.6 MG tablet Take 2 tablets by mouth 2 times daily        sorbitol 70 % SOLN solution Take 30 mLs by mouth daily as needed for constipation       traZODone (DESYREL) 50 MG tablet Take 1 tablet (50 mg) by mouth At Bedtime 31 tablet 12     trolamine salicylate (ASPERCREME) 10 % cream Apply topically 2 times daily as needed for moderate pain (also BID)        Venlafaxine HCl (EFFEXOR XR PO) Take 150 mg by mouth At Bedtime        Medications reviewed:  Medications reconciled to facility chart and changes were made to reflect current medications as identified as above med list. Below are the changes that were made:   Medications stopped since last EPIC medication reconciliation:   There are no discontinued medications.    Medications started since last [facility] medication reconciliation:  No orders of the defined types were placed in this encounter.    REVIEW OF SYSTEMS:  Unobtainable secondary to cognitive impairment.     Physical Exam:  BP 95/48  Pulse 64  Temp 96.1  F (35.6  C)  Resp 18  Wt 194 lb 9.6 oz (88.3 kg)  SpO2 (!) 88%  BMI 35.59 kg/m2  GENERAL APPEARANCE:  Alert, in no distress, confused  RESP:  respiratory effort and palpation of chest normal, auscultation of lungs clear, diminished unless instructed to take large breath, no respiratory distress, on 1.5 LPM O2 via NC  CV:  Palpation and auscultation of heart done , rate and rhythm regular, no murmur, no-scant LE peripheral edema  ABDOMEN:  Obese, normal bowel  sounds, soft, nontender, no hepatosplenomegaly or other masses but assessment limited by body habitus.   M/S:   Gait and station with W/C mobility, Digits and nails with arthritic changes, reduced muscle mass  SKIN:  Inspection and Palpation of skin and subcutaneous tissue pale, fragile, seemingly intact  PSYCH:  insight and judgement, memory with severe impairment, affect and mood normal, does not follow commands readily         Recent Labs:     CBC RESULTS:   Recent Labs   Lab Test  05/25/18   0745  03/02/18   0815  09/01/17   0750   WBC   --   5.5  4.6   RBC   --   3.95  3.71*   HGB  10.2*  11.8  11.9   HCT   --   37.2  35.3   MCV   --   94  95   MCH   --   29.9  32.1   MCHC   --   31.7  33.7   RDW   --   12.6  12.5   PLT   --   142*  124*       Last Basic Metabolic Panel:  Recent Labs   Lab Test  06/01/18   0805  05/29/18   1009   NA  142  141   POTASSIUM  4.4  5.0   CHLORIDE  102  106   ELIZABETH  8.5  8.2*   CO2  35*  28   BUN  29  30   CR  1.32*  1.47*   GLC  96  208*         Lab Results   Component Value Date    A1C 5.9 05/25/2018    A1C 7.7 12/27/2017         Assessment/Plan:     Diastolic dysfunction, left ventricle  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  She does have COPD so 88-90% is likely adequate for her.  Nursing to please titrate O2 accordingly.     Will change her daily Lasix to 20 mg and f/u in a few days to assess to determine need.  BPs on lower side so if  Lasix is needed for daily use long-term, should look to decrease her ACEI or CCB.      Will monitor closely and will f/u in a few days.     Orders:  1.  Decrease Lasix to 20 mg po Q am.  Dx: CHF  2.  BMP 6/8/18.  Dx: CHF  3.  Daily weights x 7 days, in AM before breakfast, after void.  Dx: CHF    Electronically signed by  LORI Ricks CNP                      Sincerely,        LORI Ricks CNP

## 2018-06-07 NOTE — PROGRESS NOTES
[facility]    Chief Complaint   Patient presents with     FPC Acute       [place] Medical Record Number:  [id number]    HPI:    Jayne Vasquez is a 87 year old  (9/21/1930), who is being seen today for an episodic care visit [facility] .  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 Last K 4.4)    6/2015 ECHO with EF 60-65%, + LVH    Patient has a significant cardiac history, recently being treated for CHF exacerbation with Lasix bolusing and O2 application.  (see previous notes for specific details)    5/29/18 patient bolused with Lasix 40 mg qAM, 20 mg q afternoon x 2 days  6/1/18 - Lasix reduced to 20 mg daily  Previously patient was not on diuretics    Weights:  5/1/18 - 192 lbs  5/30/18 - 206 lbs  5/31/18 - 194.6 lbs  6/2 - 190.3  6/3 - 192.8  6/4 - 190.8  6/5 - 189.6  6/6 - 191.2  6/7 - 197.8  6/8 - 190.8    BPs 120-130s/60-70s  HRs 57-89    Sats have been 89-97%, today she is on 1.5 LPM.  Nursing reports inability to taper off O2.      BL Creat seemingly 1.3-1.4  Last 3 BMPs:   11/20/17: BUN 28, Creat 1.37, GFR 36  3/2/18: BUN 31, Creat 1.36, GFR 37  5/29/18: BUN 30, Creat 1.47, GFR 34  6/1/18: BUN 29, Creat 1.32, GFR 38  6/8/18: BUN 29, Creat 1.36, GFR 37      ALLERGIES:  Dye [contrast dye]; Fluoxetine; Iodine-131; Methocarbamol; Paroxetine; and Penicillins  Past Medical, Surgical, Family and Social History reviewed and updated in [facility].    Current Outpatient Prescriptions   Medication Sig Dispense Refill     Acetaminophen (TYLENOL PO) Take 650 mg by mouth 2 times daily Also TID PRN       amLODIPine (NORVASC) 10 MG tablet Take 1 tablet (10 mg) by mouth daily 90 tablet 3     ASPIRIN PO Take 81 mg by mouth daily       BusPIRone HCl (BUSPAR PO) Take 5 mg by mouth 3 times daily        Carvedilol (COREG PO) Take 25 mg by mouth 2 times daily (with meals)       Cranberry-Vitamin C-Inulin (UTI-STAT PO) Take by mouth daily       fluticasone (FLONASE) 50 MCG/ACT nasal spray Spray 1 spray into both nostrils At Bedtime       Furosemide (LASIX PO) Take 20 mg by mouth daily       GABAPENTIN PO Take 600 mg by mouth every morning Also 900 QHS       Glycerin-Dimeth-Surfactants (ONE-STEP SKIN CARE LOTION) LOTN Externally apply topically daily       hydrocortisone (ANUSOL-HC) 2.5 % rectal cream Place rectally 3 times daily as needed        hypromellose (ARTIFICIAL TEARS) 0.5 % SOLN 1 drop 3 times daily as needed for dry eyes       insulin aspart (NOVOLOG FLEXPEN) 100 UNIT/ML injection Inject 7 Units Subcutaneous 2 times daily (with meals)        insulin aspart (NOVOLOG FLEXPEN) 100 UNIT/ML injection Inject Subcutaneous 3 times daily (with meals) If blood sugar is:  200-249: give 2 units  250-299: give 4 units  300-349: give 6 units  350-400: give 8 units  > 401: give 10 units & notify NP.       Insulin Glargine (BASAGLAR KWIKPEN SC) Inject 6 Units Subcutaneous 2 times daily        ketotifen (ZADITOR/REFRESH ANTI-ITCH) 0.025 % SOLN ophthalmic solution Place 1 drop into both eyes 2 times daily as needed for itching       LevETIRAcetam (KEPPRA PO) Take 500 mg by mouth 2 times daily       LISINOPRIL PO Take 30 mg by mouth daily        mupirocin (BACTROBAN) 2 % ointment Apply topically 2 times daily as  needed (also bid unitl healed)       nitroglycerin (NITROSTAT) 0.4 MG SL tablet Place 1 tablet (0.4 mg) under the tongue every 5 minutes as needed 25 tablet      nystatin (MYCOSTATIN) 105250 UNIT/GM POWD Apply topically 2 times daily as needed        polyethylene glycol (MIRALAX/GLYCOLAX) powder Take 17 g by mouth daily as needed for constipation        QUEtiapine Fumarate (SEROQUEL PO) Take 25 mg by mouth 2 times daily        QUEtiapine Fumarate (SEROQUEL PO) Take 12.5 mg by mouth daily midday       sennosides (SENOKOT) 8.6 MG tablet Take 2 tablets by mouth 2 times daily        sorbitol 70 % SOLN solution Take 30 mLs by mouth daily as needed for constipation       traZODone (DESYREL) 50 MG tablet Take 1 tablet (50 mg) by mouth At Bedtime 31 tablet 12     trolamine salicylate (ASPERCREME) 10 % cream Apply topically 2 times daily as needed for moderate pain (also BID)        Venlafaxine HCl (EFFEXOR XR PO) Take 150 mg by mouth At Bedtime        Medications reviewed:  Medications reconciled to facility chart and changes were made to reflect current medications as identified as above med list. Below are the changes that were made:   Medications stopped since last EPIC medication reconciliation:   There are no discontinued medications.    Medications started since last [facility] medication reconciliation:  No orders of the defined types were placed in this encounter.    REVIEW OF SYSTEMS:  Unobtainable secondary to cognitive impairment.     Physical Exam:  /62  Pulse 57  Temp 97.6  F (36.4  C)  Resp 18  Wt 191 lb 3.2 oz (86.7 kg)  SpO2 97%  BMI 34.97 kg/m2  GENERAL APPEARANCE:  Alert, in no distress, confused  RESP:  respiratory effort and palpation of chest normal, auscultation of lungs clear, diminished unless instructed to take large breath, no respiratory distress, on 1.5 LPM O2 via NC  CV:  Palpation and auscultation of heart done , rate and rhythm regular, no murmur, no-scant LE peripheral edema  ABDOMEN:   Obese, normal bowel sounds, soft, nontender, no hepatosplenomegaly or other masses but assessment limited by body habitus.   M/S:   Gait and station with W/C mobility, Digits and nails with arthritic changes, reduced muscle mass  SKIN:  Inspection and Palpation of skin and subcutaneous tissue pale, fragile, seemingly intact  PSYCH:  insight and judgement, memory with severe impairment, affect and mood normal, does not follow commands readily         Recent Labs:     CBC RESULTS:   Recent Labs   Lab Test  05/25/18   0745  03/02/18   0815  09/01/17   0750   WBC   --   5.5  4.6   RBC   --   3.95  3.71*   HGB  10.2*  11.8  11.9   HCT   --   37.2  35.3   MCV   --   94  95   MCH   --   29.9  32.1   MCHC   --   31.7  33.7   RDW   --   12.6  12.5   PLT   --   142*  124*       Last Basic Metabolic Panel:  Recent Labs   Lab Test  06/01/18   0805  05/29/18   1009   NA  142  141   POTASSIUM  4.4  5.0   CHLORIDE  102  106   ELIZABETH  8.5  8.2*   CO2  35*  28   BUN  29  30   CR  1.32*  1.47*   GLC  96  208*         Lab Results   Component Value Date    A1C 5.9 05/25/2018    A1C 7.7 12/27/2017         Assessment/Plan:     Diastolic dysfunction, left ventricle  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  Her current status may very well be her new baseline with need for O2 and daily Lasix.  Would prefer to Decrease/DC Lasix however patient may very well retain fluid again and is on narrow grounds from stability vs  cardio-renal status.     Will not make any changes today and will monitor weights, VS, kidney function.   May anticipate decrease in Lasix (but possible she will never be with it).     Orders:  1.  Please continue daily weights.  2.  Please continue attempting to wean 02 (goal sats > 88%)    Electronically signed by  LORI Ricks CNP

## 2018-06-08 NOTE — LETTER
6/8/2018        RE: Jayne Vasquez  [address] [address] [place] [id number]        [facility]    Chief Complaint   Patient presents with     senior care Acute       [place] Medical Record Number:  [id number]    HPI:    Jayne Vasquez is a 87 year old  (9/21/1930), who is being [facility] .  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 Last K 4.4)    6/2015 ECHO with EF 60-65%, + LVH    Patient has a significant cardiac history, recently being treated for CHF exacerbation with Lasix bolusing and O2 application.  (see previous notes for specific details)    5/29/18 patient bolused with Lasix 40 mg qAM, 20 mg q afternoon x 2 days  6/1/18 - Lasix reduced to 20 mg daily  Previously patient was not on diuretics    Weights:  5/1/18 - 192 lbs  5/30/18 - 206 lbs  5/31/18 - 194.6 lbs  6/2 - 190.3  6/3 - 192.8  6/4 - 190.8  6/5 - 189.6  6/6 - 191.2  6/7 - 197.8  6/8 - 190.8    BPs 120-130s/60-70s  HRs 57-89    Sats have been 89-97%, today she is on 1.5 LPM.  Nursing reports inability to taper off O2.      BL Creat seemingly 1.3-1.4  Last 3 BMPs:   11/20/17: BUN 28, Creat 1.37, GFR 36  3/2/18: BUN 31, Creat 1.36, GFR 37  5/29/18: BUN 30, Creat 1.47, GFR  34  6/1/18: BUN 29, Creat 1.32, GFR 38  6/8/18: BUN 29, Creat 1.36, GFR 37      ALLERGIES: Dye [contrast dye]; Fluoxetine; Iodine-131; Methocarbamol; Paroxetine; and Penicillins  Past Medical, Surgical, Family and Social History reviewed and updated in [facility].    Current Outpatient Prescriptions   Medication Sig Dispense Refill     Acetaminophen (TYLENOL PO) Take 650 mg by mouth 2 times daily Also TID PRN       amLODIPine (NORVASC) 10 MG tablet Take 1 tablet (10 mg) by mouth daily 90 tablet 3     ASPIRIN PO Take 81 mg by mouth daily       BusPIRone HCl (BUSPAR PO) Take 5 mg by mouth 3 times daily        Carvedilol (COREG PO) Take 25 mg by mouth 2 times daily (with meals)       Cranberry-Vitamin C-Inulin (UTI-STAT PO) Take by mouth daily       fluticasone (FLONASE) 50 MCG/ACT nasal spray Spray 1 spray into both nostrils At Bedtime       Furosemide (LASIX PO) Take 20 mg by mouth daily       GABAPENTIN PO Take 600 mg by mouth every morning Also 900 QHS       Glycerin-Dimeth-Surfactants (ONE-STEP SKIN CARE LOTION) LOTN Externally apply topically daily       hydrocortisone (ANUSOL-HC) 2.5 % rectal cream Place rectally 3 times daily as needed        hypromellose (ARTIFICIAL TEARS) 0.5 % SOLN 1 drop 3 times daily as needed for dry eyes       insulin aspart (NOVOLOG FLEXPEN) 100 UNIT/ML injection Inject 7 Units Subcutaneous 2 times daily (with meals)        insulin aspart (NOVOLOG FLEXPEN) 100 UNIT/ML injection Inject Subcutaneous 3 times daily (with meals) If blood sugar is:  200-249: give 2 units  250-299: give 4 units  300-349: give 6 units  350-400: give 8 units  > 401: give 10 units & notify NP.       Insulin Glargine (BASAGLAR KWIKPEN SC) Inject 6 Units Subcutaneous 2 times daily        ketotifen (ZADITOR/REFRESH ANTI-ITCH) 0.025 % SOLN ophthalmic solution Place 1 drop into both eyes 2 times daily as needed for itching       LevETIRAcetam (KEPPRA PO) Take 500 mg by mouth 2 times daily       LISINOPRIL PO Take 30 mg by  mouth daily        mupirocin (BACTROBAN) 2 % ointment Apply topically 2 times daily as needed (also bid unitl healed)       nitroglycerin (NITROSTAT) 0.4 MG SL tablet Place 1 tablet (0.4 mg) under the tongue every 5 minutes as needed 25 tablet      nystatin (MYCOSTATIN) 814077 UNIT/GM POWD Apply topically 2 times daily as needed        polyethylene glycol (MIRALAX/GLYCOLAX) powder Take 17 g by mouth daily as needed for constipation        QUEtiapine Fumarate (SEROQUEL PO) Take 25 mg by mouth 2 times daily        QUEtiapine Fumarate (SEROQUEL PO) Take 12.5 mg by mouth daily midday       sennosides (SENOKOT) 8.6 MG tablet Take 2 tablets by mouth 2 times daily        sorbitol 70 % SOLN solution Take 30 mLs by mouth daily as needed for constipation       traZODone (DESYREL) 50 MG tablet Take 1 tablet (50 mg) by mouth At Bedtime 31 tablet 12     trolamine salicylate (ASPERCREME) 10 % cream Apply topically 2 times daily as needed for moderate pain (also BID)        Venlafaxine HCl (EFFEXOR XR PO) Take 150 mg by mouth At Bedtime        Medications reviewed:  Medications reconciled to facility chart and changes were made to reflect current medications as identified as above med list. Below are the changes that were made:   Medications stopped since last EPIC medication reconciliation:   There are no discontinued medications.    Medications started since last [facility] medication reconciliation:  No orders of the defined types were placed in this encounter.    REVIEW OF SYSTEMS:  Unobtainable secondary to cognitive impairment.     Physical Exam:  /62  Pulse 57  Temp 97.6  F (36.4  C)  Resp 18  Wt 191 lb 3.2 oz (86.7 kg)  SpO2 97%  BMI 34.97 kg/m2  GENERAL APPEARANCE:  Alert, in no distress, confused  RESP:  respiratory effort and palpation of chest normal, auscultation of lungs clear, diminished unless instructed to take large breath, no respiratory distress, on 1.5 LPM O2 via NC  CV:  Palpation and auscultation of  heart done , rate and rhythm regular, no murmur, no-scant LE peripheral edema  ABDOMEN:  Obese, normal bowel sounds, soft, nontender, no hepatosplenomegaly or other masses but assessment limited by body habitus.   M/S:   Gait and station with W/C mobility, Digits and nails with arthritic changes, reduced muscle mass  SKIN:  Inspection and Palpation of skin and subcutaneous tissue pale, fragile, seemingly intact  PSYCH:  insight and judgement, memory with severe impairment, affect and mood normal, does not follow commands readily         Recent Labs:     CBC RESULTS:   Recent Labs   Lab Test  05/25/18   0745  03/02/18   0815  09/01/17   0750   WBC   --   5.5  4.6   RBC   --   3.95  3.71*   HGB  10.2*  11.8  11.9   HCT   --   37.2  35.3   MCV   --   94  95   MCH   --   29.9  32.1   MCHC   --   31.7  33.7   RDW   --   12.6  12.5   PLT   --   142*  124*       Last Basic Metabolic Panel:  Recent Labs   Lab Test  06/01/18   0805  05/29/18   1009   NA  142  141   POTASSIUM  4.4  5.0   CHLORIDE  102  106   ELIZABETH  8.5  8.2*   CO2  35*  28   BUN  29  30   CR  1.32*  1.47*   GLC  96  208*         Lab Results   Component Value Date    A1C 5.9 05/25/2018    A1C 7.7 12/27/2017         Assessment/Plan:     Diastolic dysfunction, left ventricle  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  Her current status may very well be her new baseline with need for O2 and daily Lasix.  Would prefer to Decrease/DC Lasix  however patient may very well retain fluid again and is on narrow grounds from stability vs cardio-renal status.     Will not make any changes today and will monitor weights, VS, kidney function.   May anticipate decrease in Lasix (but possible she will never be with it).     Orders:  1.  Please continue daily weights.  2.  Please continue attempting to wean 02 (goal sats > 88%)    Electronically signed by  LORI Ricks CNP                      Sincerely,        LORI Ricks CNP

## 2018-06-29 NOTE — PROGRESS NOTES
"[facility]    Chief Complaint   Patient presents with     Nursing Home Acute       HPI:    Jayne Vasquez is a 87 year old  (9/21/1930), who is being seen today for an episodic care visit at [facility].    HPI information obtained from: facility chart records, facility staff, patient report and [facility] chart review. Today's concern is:     Type 2 diabetes mellitus with complication, with long-term current use of insulin (H)  Vascular dementia with behavioral disturbance     Patient with diabetes type 2 currently on basaglar 6 units BID and Novolog 7 units with lunch and dinner, Novolog SSI.  Patient often did not eat breakfast in the past until later.     Blood glucose monitoring:  AMs: 119-156 (0 SSI)  Lunch: 263-323 (6 units SSI)  Dinner: 211-259 (0-4 SSI)    Patient unable to comment on numbness/tingling d/t cognitive impairment    REVIEW OF SYSTEMS:  Unobtainable secondary to cognitive impairment.     Past medical and surgical history reviewed.     /61  Pulse 85  Temp 97.9  F (36.6  C)  Resp 19  Ht 5' 2\" (1.575 m)  Wt 191 lb 4.8 oz (86.8 kg)  SpO2 91%  BMI 34.99 kg/m2  GENERAL APPEARANCE:  Alert, in no distress  RESP:  respiratory effort normal, no respiratory distress  CV:  No LE peripheral edema  ABDOMEN:  Nondistended, obese  M/S:   Gait and station with W/C mobility, Digits and nails with arthritic changes, reduced muscle mass  SKIN:  Inspection and Palpation of skin and subcutaneous tissue pale, thin, fargile  PSYCH:  insight and judgement, memory with severe impairment , affect and mood normal, does not follow commands readily           ASSESSMENT/PLAN:     Type 2 diabetes mellitus with complication, with long-term current use of insulin (H)  Vascular dementia with behavioral disturbance     Will add Novolog 6 units to breakfast to help control lunch blood glucose levels.  Will monitor.  Goal to DC SSI however patient has been unstable for a while now so will keep " for now      Orders:   1.  Add Novolog 6 units sq after breakfast.  Give after pt has eaten at least 50% of meal.        Electronically signed by:  LORI Ricks CNP

## 2018-06-29 NOTE — LETTER
"    6/29/2018        RE: Jayne Vasquez  [address] [address] [place] [id number]        [facility]    Chief Complaint   Patient presents with     Nursing Home Acute       HPI:    Jayne Vasquez is a 87 year old  (9/21/1930), who is being seen today for an episodic care visit at [facility].    HPI information obtained from: facility chart records, facility staff, patient report and [facility] chart review. Today's concern is:     Type 2 diabetes mellitus with complication, with long-term current use of insulin (H)  Vascular dementia with behavioral disturbance     Patient with diabetes type 2 currently on basaglar 6 units BID and Novolog 7 units with lunch and dinner, Novolog SSI.  Patient often did not eat breakfast in the past until later.     Blood glucose monitoring:  AMs: 119-156 (0 SSI)  Lunch: 263-323 (6 units SSI)  Dinner: 211-259 (0-4 SSI)    Patient unable to comment on numbness/tingling d/t cognitive impairment    REVIEW OF SYSTEMS:  Unobtainable secondary to cognitive impairment.     Past medical and surgical history reviewed.     /61  Pulse 85  Temp 97.9  F (36.6  C)  Resp 19  Ht 5' 2\" (1.575 m)  Wt 191 lb 4.8 oz (86.8 kg)  SpO2 91%  BMI 34.99 kg/m2  GENERAL APPEARANCE:  Alert, in no distress  RESP:  respiratory effort normal, no respiratory distress  CV:  No LE peripheral edema  ABDOMEN:  Nondistended, obese  M/S:   Gait and station with W/C mobility, Digits and nails with arthritic changes, reduced muscle mass  SKIN:  Inspection and Palpation of skin and subcutaneous tissue pale, thin, fargile  PSYCH:  insight and judgement, memory with severe impairment , affect and mood normal, does not follow commands readily           ASSESSMENT/PLAN:     Type 2 diabetes mellitus with complication, with long-term current use of insulin (H)  Vascular dementia with behavioral disturbance     Will add Novolog 6 units to breakfast to help control lunch blood glucose levels.  Will " monitor.  Goal to DC SSI however patient has been unstable for a while now so will keep for now      Orders:   1.  Add Novolog 6 units sq after breakfast.  Give after pt has eaten at least 50% of meal.        Electronically signed by:  LORI Ricks CNP      Sincerely,        LORI Ricks CNP

## 2018-07-05 NOTE — PROGRESS NOTES
[facility]    Chief Complaint   Patient presents with     halfway Regulatory       [place] Medical Record Number:  [id number]    HPI:    Jayne Vasquez is a 87 year old  (9/21/1930), who is being seen today for a federally mandated E/M visit at [facility] .  HPI information obtained from: facility chart records, facility staff, patient report and [facility] chart review. Today's concerns are:  Type 2 diabetes mellitus with complication, with long-term current use of insulin (H)  On basaglar 6 units BID, Novolog 6 units with breakfast (recently added), 7 units with lunch and dinner, and SSI  She takes Gabapentin 600 mg qAM, 900 mg qHS for neuropathic foot pain.   Blood glucose monitoring:  AM: 125-169 (0 SSI)  Lunch: 240-383 (2-8 SSI)  Dinner: 144-217 (0-2 SSI)    Lab Results   Component Value Date    A1C 5.9 05/25/2018    A1C 7.7 12/27/2017    A1C 7.6 07/31/2017    A1C 7.5 04/26/2017    A1C  01/25/2017     Canceled, Test credited   Duplicate request  CORRECTED ON 01/25 AT 0913: PREVIOUSLY REPORTED AS 7.4       Since last A1C, however, blood glucose levels have dramatically increased (with CHF exacerbation, infection, etc).     Patient is on ASA, ACEI. Is not on statin d/t goals of care and advanced age.     Morbid obesity (H)  Body mass index is 35.63 kg/(m^2).  Patient has DM2, HTN, CKD.     Hx of Nontraumatic hemorrhage of cerebral hemisphere, unspecified laterality (H)  Convulsions, unspecified convulsion type (H)  On ASA 81 mg daily, levetiracetam 500 mg BID (reduced d/t no history of seizures since her CVA).   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   Recent exacerbation - resolved now. Patient off O2.     BPs 110-130s/60-70s  HR 80-92  Patient unable to comment on symptoms of CP, HA, lightheadedness d/t cognitive impairment.     Weights:  7/4 - 194.8  7/3 - 189.4  7/2 - 192.4  7/1 - 191.8  6/30 - 190.6  6/29 - 191.3  6/28 - 192.8    Vascular dementia with behavioral disturbance  Moderate major depression (H)  Paranoia (H)  Delusional disorder (H)  Visual hallucinations  Per Epic History:  Patient with history of severe episodes of anxiety, hallucinations and paranoia.  Patient often reporting seeing men in her room who are going to hurt her, or afraid that people will steal her laundry when it is being washed, etc.       BIMS - \"severe\"  PHQ9 - 4    Nursing reports only one recent episode of patient crying out and inconsolable.      on buspar 5 mg TID, effexor  mg qHS, seroquel 25 mg qAM and PM and 12.5 mg mid-day, trazodone 50 mg qHS.      ALLERGIES: Dye [contrast dye]; Fluoxetine; Iodine-131; Methocarbamol; Paroxetine; and Penicillins  PAST MEDICAL HISTORY:  has a past medical history of Anemia, unspecified (11/04/10); Aphasia (02/12/10); Cataracts; Cerebral embolism with cerebral infarction (H) (02/16/10); Chest pain (11/04/10); Confusion (01/30/10); Congestive heart failure, unspecified; Coronary atherosclerosis of unspecified type of vessel, native or graft (05/20/08); Diabetes mellitus (H); Diabetic eye exam (H) (3/26/15); Diabetic infection of left foot (2/26/2013); Diastolic dysfunction, left ventricle, " grade I by Echo (4/2/2012); GERD (gastroesophageal reflux disease) (11/04/10); GI bleed; History of blood transfusion; History of recurrent UTIs; Hyperlipemia (4.22.11); Hypertension; Hyponatremia; Intermediate coronary syndrome (H); Labral tear of long head of biceps tendon; LVH (left ventricular hypertrophy) due to hypertensive disease - mild-moderate (4/2/2012); Open wound of left foot in 4th interdigital space (2/26/2013); Osteoarthrosis, shoulder region (07/19/09); Rotator cuff tear; Seizure disorder, secondary (H) (02/16/10); Subacromial bursitis; Transient cerebral ischemia (3/30/2012); Unspecified cerebral artery occlusion with cerebral infarction; and Vitamin D deficiency (02/08/10). She also has no past medical history of Asthma; Malignant neoplasm (H); or Thyroid disease.  PAST SURGICAL HISTORY:  has a past surgical history that includes appendectomy; Cholecystectomy; Hysterectomy; surgical history of - ; colonoscopy; cataract iol, rt/lt; surgical history of - ; surgical history of -  (5/2008); surgical history of - ; surgical history of - ; colonoscopy (5/31/11); surgical history of -  (5/31/11); cardiac catherization (05/20/08); Esophagoscopy, gastroscopy, duodenoscopy (EGD), combined (4/5/2012); Bypass graft artery coronary (4/9/2012); and Phacoemulsification with standard intraocular lens implant (Right, 4/16/2015).  FAMILY HISTORY: family history includes C.A.D. in her father, mother, and son; Cancer in her mother and sister; Cerebrovascular Disease in her sister; Neurologic Disorder in her son.  SOCIAL HISTORY:  reports that she has never smoked. She has never used smokeless tobacco. She reports that she does not drink alcohol or use illicit drugs.    MEDICATIONS:  Current Outpatient Prescriptions   Medication Sig Dispense Refill     Acetaminophen (TYLENOL PO) Take 650 mg by mouth 2 times daily Also TID PRN       amLODIPine (NORVASC) 10 MG tablet Take 1 tablet (10 mg) by mouth daily 90 tablet 3      ASPIRIN PO Take 81 mg by mouth daily       bisacodyl (DULCOLAX) 10 MG Suppository Place 10 mg rectally daily as needed for constipation       BusPIRone HCl (BUSPAR PO) Take 7.5 mg by mouth 2 times daily        Carvedilol (COREG PO) Take 25 mg by mouth 2 times daily (with meals)       Cranberry-Vitamin C-Inulin (UTI-STAT PO) Take by mouth daily       fluticasone (FLONASE) 50 MCG/ACT nasal spray Spray 1 spray into both nostrils At Bedtime       Furosemide (LASIX PO) Take 20 mg by mouth daily       GABAPENTIN PO Take 600 mg by mouth every morning Also 900 QHS       Glycerin-Dimeth-Surfactants (ONE-STEP SKIN CARE LOTION) LOTN Externally apply topically daily       hydrocortisone (ANUSOL-HC) 2.5 % rectal cream Place rectally 3 times daily as needed        hypromellose (ARTIFICIAL TEARS) 0.5 % SOLN 1 drop 3 times daily as needed for dry eyes       insulin aspart (NOVOLOG FLEXPEN) 100 UNIT/ML injection Inject 7 Units Subcutaneous 3 times daily (with meals)        Insulin Glargine (BASAGLAR KWIKPEN SC) Inject 6 Units Subcutaneous 2 times daily        ketotifen (ZADITOR/REFRESH ANTI-ITCH) 0.025 % SOLN ophthalmic solution Place 1 drop into both eyes 2 times daily as needed for itching       LevETIRAcetam (KEPPRA PO) Take 500 mg by mouth 2 times daily       LISINOPRIL PO Take 20 mg by mouth daily        mupirocin (BACTROBAN) 2 % ointment Apply topically 2 times daily as needed (also bid unitl healed)       nitroglycerin (NITROSTAT) 0.4 MG SL tablet Place 1 tablet (0.4 mg) under the tongue every 5 minutes as needed 25 tablet      nystatin (MYCOSTATIN) 433808 UNIT/GM POWD Apply topically 2 times daily as needed        polyethylene glycol (MIRALAX/GLYCOLAX) powder Take 17 g by mouth daily as needed for constipation        QUEtiapine Fumarate (SEROQUEL PO) Take 25 mg by mouth 2 times daily        QUEtiapine Fumarate (SEROQUEL PO) Take 12.5 mg by mouth daily midday       sennosides (SENOKOT) 8.6 MG tablet Take 2 tablets by mouth 2  "times daily        sorbitol 70 % SOLN solution Take 30 mLs by mouth daily as needed for constipation       traZODone (DESYREL) 50 MG tablet Take 1 tablet (50 mg) by mouth At Bedtime 31 tablet 12     trolamine salicylate (ASPERCREME) 10 % cream Apply topically 2 times daily as needed for moderate pain (also BID)        Venlafaxine HCl (EFFEXOR XR PO) Take 150 mg by mouth At Bedtime        Medications reviewed:  Medications reconciled to facility chart and changes were made to reflect current medications as identified as above med list. Below are the changes that were made:   Medications stopped since last EPIC medication reconciliation:   There are no discontinued medications.    Medications started since last [facility] medication reconciliation:  Orders Placed This Encounter   Medications     bisacodyl (DULCOLAX) 10 MG Suppository     Sig: Place 10 mg rectally daily as needed for constipation     Case Management:  I have reviewed the care plan and MDS and do agree with the plan. Patient's desire to return to the community is not assessible due to cognitive impairment.  Information reviewed:  Medications, vital signs, orders, and nursing notes.    ROS:  Unobtainable secondary to cognitive impairment.     Exam:  Vitals: /67  Pulse 80  Temp 94.4  F (34.7  C)  Resp 18  Ht 5' 2\" (1.575 m)  Wt 194 lb 12.8 oz (88.4 kg)  SpO2 90%  BMI 35.63 kg/m2  BMI= Body mass index is 35.63 kg/(m^2).  GENERAL APPEARANCE:  Alert, in no distress, confused  RESP:  respiratory effort and palpation of chest normal, auscultation of lungs clear , no respiratory distress  CV:  Palpation and auscultation of heart done , rate and rhythm regular, no murmur, no LE peripheral edema  ABDOMEN:  normal bowel sounds, soft, nontender, no hepatosplenomegaly or other masses  M/S:   Gait and station with W/C mobility, Digits and nails with arthritic changes, reduced muscle mass  SKIN:  Inspection and Palpation of skin and subcutaneous tissue pale, " fragile, thin  PSYCH:  insight and judgement, memory with severe impairment, affect and mood normal, does not follow commands readily         Lab/Diagnostic data:   CBC RESULTS:   Recent Labs   Lab Test  05/25/18   0745  03/02/18   0815  09/01/17   0750   WBC   --   5.5  4.6   RBC   --   3.95  3.71*   HGB  10.2*  11.8  11.9   HCT   --   37.2  35.3   MCV   --   94  95   MCH   --   29.9  32.1   MCHC   --   31.7  33.7   RDW   --   12.6  12.5   PLT   --   142*  124*       Last Basic Metabolic Panel:  Recent Labs   Lab Test  06/08/18   0740  06/01/18   0805   NA  141  142   POTASSIUM  4.6  4.4   CHLORIDE  102  102   ELIZABETH  8.4*  8.5   CO2  33*  35*   BUN  29  29   CR  1.36*  1.32*   GLC  107*  96       Lab Results   Component Value Date    A1C 5.9 05/25/2018    A1C 7.7 12/27/2017       ASSESSMENT/PLAN  Type 2 diabetes mellitus with complication, with long-term current use of insulin (H)  Will DC SSI and monitor blood glucose levels  Next A1C not due until 8/24/18.  Will add extra breakfast insulin to help lunch blood glucose levels.   Will monitor for titration needs.     Morbid obesity (H)  Contributory to slow healing, inflammatory state.  Recommend healthy diet, activity as able.     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  Recent exacerbation with resolution as patient off O2 now, LS clear, no LE edema, breathing comfortably.   Goal BP <140/90 - will reduce ACEI as with new  addition of diuretic, BPs lower than necessary.     Vascular dementia with behavioral disturbance  Moderate major depression (H)  Paranoia (H)  Delusional disorder (H)  Visual hallucinations  No need to recheck levetiracetam level as no determined reference range and dosing should be based on tolerability and efficacy.  No seizures since CVA. May anticipate further taper of levetiracetam as can contribute to CNS S/E of psychosis, anxiety/agitation.   Will change Buspar to BID.       Orders:  1.  Change weights to weekly.  Dx: CHF  2.  D/c current Novolog (breakfast, lunch, dinner).  3.  Novolog 7 units sq TID AC.  Dx: DM2  4.  D/c SSI.  5.  Change Buspar to 7.5 mg po BID.  6.  Decrease Lisinopril to 20 mg po QD.  Dx: HTN    Electronically signed by:  LORI Ricks CNP

## 2018-07-05 NOTE — LETTER
7/5/2018        RE: Jayne Vasquez  [address] [address] [place] [id number]          [facility]    Chief Complaint   Patient presents with     USP Regulatory       [place] Medical Record Number:  [id number]    HPI:    Jayne Vasquez is a 87 year old  (9/21/1930), who is being seen today for a federally mandated E/M visit at [facility] .  HPI information obtained from: facility chart records, facility staff, patient report and [facility] chart review. Today's concerns are:  Type 2 diabetes mellitus with complication, with long-term current use of insulin (H)  On basaglar 6 units BID, Novolog 6 units with breakfast (recently added), 7 units with lunch and dinner, and SSI  She takes Gabapentin 600 mg qAM, 900 mg qHS for neuropathic foot pain.   Blood glucose monitoring:  AM: 125-169 (0 SSI)  Lunch: 240-383 (2-8 SSI)  Dinner: 144-217 (0-2 SSI)    Lab Results   Component Value Date    A1C 5.9 05/25/2018    A1C 7.7 12/27/2017    A1C 7.6 07/31/2017    A1C 7.5 04/26/2017    A1C  01/25/2017     Canceled, Test credited   Duplicate request  CORRECTED ON 01/25 AT 0913: PREVIOUSLY REPORTED AS 7.4       Since last A1C, however, blood glucose levels have dramatically increased (with CHF exacerbation, infection, etc).     Patient is on ASA, ACEI. Is not on statin d/t goals of care and advanced age.     Morbid obesity (H)  Body mass index is 35.63 kg/(m^2).  Patient has DM2, HTN, CKD.     Hx of Nontraumatic hemorrhage of cerebral hemisphere, unspecified laterality (H)  Convulsions, unspecified convulsion type (H)  On ASA 81 mg daily, levetiracetam 500 mg BID (reduced d/t no history of seizures since her CVA).   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   Recent exacerbation - resolved now. Patient off O2.     BPs 110-130s/60-70s  HR 80-92  Patient unable to comment on symptoms of CP, HA, lightheadedness d/t cognitive impairment.     Weights:  7/4 - 194.8  7/3 - 189.4  7/2 - 192.4  7/1 - 191.8  6/30 - 190.6  6/29 - 191.3  6/28 - 192.8    Vascular dementia with behavioral disturbance  Moderate major depression (H)  Paranoia (H)  Delusional disorder (H)  Visual hallucinations  Per Epic History:  Patient with history of severe episodes of anxiety, hallucinations and paranoia.  Patient often reporting seeing men in her room who are going to hurt her, or afraid that people will steal her laundry when it is being washed, etc.       BIMS - \"severe\"  PHQ9 - 4    Nursing reports only one recent episode of patient crying out and inconsolable.      on buspar 5 mg TID, effexor  mg qHS, seroquel 25 mg qAM and PM and 12.5 mg mid-day, trazodone 50 mg qHS.      ALLERGIES: Dye [contrast dye]; Fluoxetine; Iodine-131; Methocarbamol; Paroxetine; and Penicillins  PAST MEDICAL HISTORY:  has a past medical history of Anemia, unspecified (11/04/10); Aphasia (02/12/10); Cataracts; Cerebral embolism with cerebral infarction (H) (02/16/10); Chest pain (11/04/10); Confusion (01/30/10); Congestive heart failure, unspecified; Coronary atherosclerosis of unspecified type of vessel, native or graft (05/20/08); Diabetes mellitus (H); Diabetic eye exam (H) (3/26/15); Diabetic " infection of left foot (2/26/2013); Diastolic dysfunction, left ventricle, grade I by Echo (4/2/2012); GERD (gastroesophageal reflux disease) (11/04/10); GI bleed; History of blood transfusion; History of recurrent UTIs; Hyperlipemia (4.22.11); Hypertension; Hyponatremia; Intermediate coronary syndrome (H); Labral tear of long head of biceps tendon; LVH (left ventricular hypertrophy) due to hypertensive disease - mild-moderate (4/2/2012); Open wound of left foot in 4th interdigital space (2/26/2013); Osteoarthrosis, shoulder region (07/19/09); Rotator cuff tear; Seizure disorder, secondary (H) (02/16/10); Subacromial bursitis; Transient cerebral ischemia (3/30/2012); Unspecified cerebral artery occlusion with cerebral infarction; and Vitamin D deficiency (02/08/10). She also has no past medical history of Asthma; Malignant neoplasm (H); or Thyroid disease.  PAST SURGICAL HISTORY:  has a past surgical history that includes appendectomy; Cholecystectomy; Hysterectomy; surgical history of - ; colonoscopy; cataract iol, rt/lt; surgical history of - ; surgical history of -  (5/2008); surgical history of - ; surgical history of - ; colonoscopy (5/31/11); surgical history of -  (5/31/11); cardiac catherization (05/20/08); Esophagoscopy, gastroscopy, duodenoscopy (EGD), combined (4/5/2012); Bypass graft artery coronary (4/9/2012); and Phacoemulsification with standard intraocular lens implant (Right, 4/16/2015).  FAMILY HISTORY: family history includes C.A.D. in her father, mother, and son; Cancer in her mother and sister; Cerebrovascular Disease in her sister; Neurologic Disorder in her son.  SOCIAL HISTORY:  reports that she has never smoked. She has never used smokeless tobacco. She reports that she does not drink alcohol or use illicit drugs.    MEDICATIONS:  Current Outpatient Prescriptions   Medication Sig Dispense Refill     Acetaminophen (TYLENOL PO) Take 650 mg by mouth 2 times daily Also TID PRN       amLODIPine  (NORVASC) 10 MG tablet Take 1 tablet (10 mg) by mouth daily 90 tablet 3     ASPIRIN PO Take 81 mg by mouth daily       bisacodyl (DULCOLAX) 10 MG Suppository Place 10 mg rectally daily as needed for constipation       BusPIRone HCl (BUSPAR PO) Take 7.5 mg by mouth 2 times daily        Carvedilol (COREG PO) Take 25 mg by mouth 2 times daily (with meals)       Cranberry-Vitamin C-Inulin (UTI-STAT PO) Take by mouth daily       fluticasone (FLONASE) 50 MCG/ACT nasal spray Spray 1 spray into both nostrils At Bedtime       Furosemide (LASIX PO) Take 20 mg by mouth daily       GABAPENTIN PO Take 600 mg by mouth every morning Also 900 QHS       Glycerin-Dimeth-Surfactants (ONE-STEP SKIN CARE LOTION) LOTN Externally apply topically daily       hydrocortisone (ANUSOL-HC) 2.5 % rectal cream Place rectally 3 times daily as needed        hypromellose (ARTIFICIAL TEARS) 0.5 % SOLN 1 drop 3 times daily as needed for dry eyes       insulin aspart (NOVOLOG FLEXPEN) 100 UNIT/ML injection Inject 7 Units Subcutaneous 3 times daily (with meals)        Insulin Glargine (BASAGLAR KWIKPEN SC) Inject 6 Units Subcutaneous 2 times daily        ketotifen (ZADITOR/REFRESH ANTI-ITCH) 0.025 % SOLN ophthalmic solution Place 1 drop into both eyes 2 times daily as needed for itching       LevETIRAcetam (KEPPRA PO) Take 500 mg by mouth 2 times daily       LISINOPRIL PO Take 20 mg by mouth daily        mupirocin (BACTROBAN) 2 % ointment Apply topically 2 times daily as needed (also bid unitl healed)       nitroglycerin (NITROSTAT) 0.4 MG SL tablet Place 1 tablet (0.4 mg) under the tongue every 5 minutes as needed 25 tablet      nystatin (MYCOSTATIN) 923123 UNIT/GM POWD Apply topically 2 times daily as needed        polyethylene glycol (MIRALAX/GLYCOLAX) powder Take 17 g by mouth daily as needed for constipation        QUEtiapine Fumarate (SEROQUEL PO) Take 25 mg by mouth 2 times daily        QUEtiapine Fumarate (SEROQUEL PO) Take 12.5 mg by mouth  "daily midday       sennosides (SENOKOT) 8.6 MG tablet Take 2 tablets by mouth 2 times daily        sorbitol 70 % SOLN solution Take 30 mLs by mouth daily as needed for constipation       traZODone (DESYREL) 50 MG tablet Take 1 tablet (50 mg) by mouth At Bedtime 31 tablet 12     trolamine salicylate (ASPERCREME) 10 % cream Apply topically 2 times daily as needed for moderate pain (also BID)        Venlafaxine HCl (EFFEXOR XR PO) Take 150 mg by mouth At Bedtime        Medications reviewed:  Medications reconciled to facility chart and changes were made to reflect current medications as identified as above med list. Below are the changes that were made:   Medications stopped since last EPIC medication reconciliation:   There are no discontinued medications.    Medications started since last [facility] medication reconciliation:  Orders Placed This Encounter   Medications     bisacodyl (DULCOLAX) 10 MG Suppository     Sig: Place 10 mg rectally daily as needed for constipation     Case Management:  I have reviewed the care plan and MDS and do agree with the plan. Patient's desire to return to the community is not assessible due to cognitive impairment.  Information reviewed:  Medications, vital signs, orders, and nursing notes.    ROS:  Unobtainable secondary to cognitive impairment.     Exam:  Vitals: /67  Pulse 80  Temp 94.4  F (34.7  C)  Resp 18  Ht 5' 2\" (1.575 m)  Wt 194 lb 12.8 oz (88.4 kg)  SpO2 90%  BMI 35.63 kg/m2  BMI= Body mass index is 35.63 kg/(m^2).  GENERAL APPEARANCE:  Alert, in no distress, confused  RESP:  respiratory effort and palpation of chest normal, auscultation of lungs clear , no respiratory distress  CV:  Palpation and auscultation of heart done , rate and rhythm regular, no murmur, no LE peripheral edema  ABDOMEN:  normal bowel sounds, soft, nontender, no hepatosplenomegaly or other masses  M/S:   Gait and station with W/C mobility, Digits and nails with arthritic changes, reduced " muscle mass  SKIN:  Inspection and Palpation of skin and subcutaneous tissue pale, fragile, thin  PSYCH:  insight and judgement, memory with severe impairment, affect and mood normal, does not follow commands readily         Lab/Diagnostic data:   CBC RESULTS:   Recent Labs   Lab Test  05/25/18   0745  03/02/18   0815  09/01/17   0750   WBC   --   5.5  4.6   RBC   --   3.95  3.71*   HGB  10.2*  11.8  11.9   HCT   --   37.2  35.3   MCV   --   94  95   MCH   --   29.9  32.1   MCHC   --   31.7  33.7   RDW   --   12.6  12.5   PLT   --   142*  124*       Last Basic Metabolic Panel:  Recent Labs   Lab Test  06/08/18   0740  06/01/18   0805   NA  141  142   POTASSIUM  4.6  4.4   CHLORIDE  102  102   ELIZABETH  8.4*  8.5   CO2  33*  35*   BUN  29  29   CR  1.36*  1.32*   GLC  107*  96       Lab Results   Component Value Date    A1C 5.9 05/25/2018    A1C 7.7 12/27/2017       ASSESSMENT/PLAN  Type 2 diabetes mellitus with complication, with long-term current use of insulin (H)  Will DC SSI and monitor blood glucose levels  Next A1C not due until 8/24/18.  Will add extra breakfast insulin to help lunch blood glucose levels.   Will monitor for titration needs.     Morbid obesity (H)  Contributory to slow healing, inflammatory state.  Recommend healthy diet, activity as able.     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  Recent exacerbation with resolution as patient off O2 now, LS clear, no  LE edema, breathing comfortably.   Goal BP <140/90 - will reduce ACEI as with new addition of diuretic, BPs lower than necessary.     Vascular dementia with behavioral disturbance  Moderate major depression (H)  Paranoia (H)  Delusional disorder (H)  Visual hallucinations  No need to recheck levetiracetam level as no determined reference range and dosing should be based on tolerability and efficacy.  No seizures since CVA. May anticipate further taper of levetiracetam as can contribute to CNS S/E of psychosis, anxiety/agitation.   Will change Buspar to BID.       Orders:  1.  Change weights to weekly.  Dx: CHF  2.  D/c current Novolog (breakfast, lunch, dinner).  3.  Novolog 7 units sq TID AC.  Dx: DM2  4.  D/c SSI.  5.  Change Buspar to 7.5 mg po BID.  6.  Decrease Lisinopril to 20 mg po QD.  Dx: HTN    Electronically signed by:  LORI Ricks CNP        Sincerely,        LORI Ricks CNP

## 2018-07-17 NOTE — PROGRESS NOTES
"[facility]    Chief Complaint   Patient presents with     Nursing Home Acute       HPI:    Jayne Vasquez is a 87 year old  (9/21/1930), who is being seen today for an episodic care visit at [facility].    HPI information obtained from: facility chart records, facility staff, patient report, [facility] chart review and family/first contact pt's son, daughter and daughter in law report. Today's concern is:     Type 2 diabetes mellitus with complication, with long-term current use of insulin (H)  Vascular dementia with behavioral disturbance     Patient on basaglar 6 units BID, Novolog 7 units TID AC.  Accuchecks TID AC:  AM: 109-135  Lunch: 232-262, 350 x 1  Dinner: 122-180    Lab Results   Component Value Date    A1C 5.9 05/25/2018    A1C 7.7 12/27/2017    A1C 7.6 07/31/2017    A1C 7.5 04/26/2017    A1C  01/25/2017     Canceled, Test credited   Duplicate request  CORRECTED ON 01/25 AT 0913: PREVIOUSLY REPORTED AS 7.4           REVIEW OF SYSTEMS:  Limited secondary to cognitive impairment but today pt reports 4 point ROS including Respiratory, CV, GI and , other than that noted in the HPI,  is negative    /47  Pulse 78  Temp 95.2  F (35.1  C)  Resp 16  Ht 5' 2\" (1.575 m)  Wt 190 lb 9.6 oz (86.5 kg)  SpO2 94%  BMI 34.86 kg/m2  GENERAL APPEARANCE:  Alert, in no distress  RESP:  respiratory effort normal, no respiratory distress  CV:  No LE peripheral edema  ABDOMEN:  nondistended  M/S:   Gait and station with W/C for mobility, Digits and nails with arthritic changes, reduced muscle mass  SKIN:  Inspection and Palpation of skin and subcutaneous tissue pale, intact  PSYCH:  insight and judgement, memory with severe impairment , affect and mood normal, does not follow commands readily     '      ASSESSMENT/PLAN:     Type 2 diabetes mellitus with complication, with long-term current use of insulin (H)  Vascular dementia with behavioral disturbance     Family present for visit today. "  Noted that Goal: HgbA1C between 8-9%. Per AGS there is potential harm in lowering the A1C to <6.5% in older adults with diabetes.   Recommended DC of Novolog and continue monitoring; family agreed.  Will make change.     Orders:  1.  D/c Novolog TID AC.  2.  D/c current accu checks  3.  Accu checks daily @ alternating times (AM vs PM) x 5 days.  Update NP w/results.  Dx: DM2    Electronically signed by:  LORI Ricks CNP

## 2018-07-17 NOTE — LETTER
"    7/17/2018        RE: Jayne Vasuqez  [address] [address] [place] [address]        [facility]    Chief Complaint   Patient presents with     Nursing Home Acute       HPI:    Jayne Vasquez is a 87 year old  (9/21/1930), who is being seen today for an episodic care visit at [facility].    HPI information obtained from: facility chart records, facility staff, patient report, [facility] chart review and family/first contact pt's son, daughter and daughter in law report. Today's concern is:     Type 2 diabetes mellitus with complication, with long-term current use of insulin (H)  Vascular dementia with behavioral disturbance     Patient on basaglar 6 units BID, Novolog 7 units TID AC.  Accuchecks TID AC:  AM: 109-135  Lunch: 232-262, 350 x 1  Dinner: 122-180    Lab Results   Component Value Date    A1C 5.9 05/25/2018    A1C 7.7 12/27/2017    A1C 7.6 07/31/2017    A1C 7.5 04/26/2017    A1C  01/25/2017     Canceled, Test credited   Duplicate request  CORRECTED ON 01/25 AT 0913: PREVIOUSLY REPORTED AS 7.4           REVIEW OF SYSTEMS:  Limited secondary to cognitive impairment but today pt reports 4 point ROS including Respiratory, CV, GI and , other than that noted in the HPI,  is negative    /47  Pulse 78  Temp 95.2  F (35.1  C)  Resp 16  Ht 5' 2\" (1.575 m)  Wt 190 lb 9.6 oz (86.5 kg)  SpO2 94%  BMI 34.86 kg/m2  GENERAL APPEARANCE:  Alert, in no distress  RESP:  respiratory effort normal, no respiratory distress  CV:  No LE peripheral edema  ABDOMEN:  nondistended  M/S:   Gait and station with W/C for mobility, Digits and nails with arthritic changes, reduced muscle mass  SKIN:  Inspection and Palpation of skin and subcutaneous tissue pale, intact  PSYCH:  insight and judgement, memory with severe impairment , affect and mood normal, does not follow commands readily     '      ASSESSMENT/PLAN:     Type 2 diabetes mellitus with complication, with long-term current use of insulin " (H)  Vascular dementia with behavioral disturbance     Family present for visit today.  Noted that Goal: HgbA1C between 8-9%. Per AGS there is potential harm in lowering the A1C to <6.5% in older adults with diabetes.   Recommended DC of Novolog and continue monitoring; family agreed.  Will make change.     Orders:  1.  D/c Novolog TID AC.  2.  D/c current accu checks  3.  Accu checks daily @ alternating times (AM vs PM) x 5 days.  Update NP w/results.  Dx: DM2    Electronically signed by:  LORI Ricks CNP      Sincerely,        LORI Ricks CNP

## 2018-08-03 NOTE — LETTER
8/3/2018        RE: Jayne Vasquez  [address] [address] [place] [id number]        [facility]    Chief Complaint   Patient presents with     Nursing Home Acute       HPI:    Jayne Vasquez is a 87 year old  (9/21/1930), who is being seen today for an episodic care visit at [facility].    HPI information obtained from: facility chart records, facility staff and [facility] chart review. Today's concern is:     Type 2 diabetes mellitus with complication, with long-term current use of insulin (H)  Vascular dementia with behavioral disturbance  Delusional disorder (H)  Paranoia (H)  Visual hallucinations  Moderate major depression (H)     Nursing called last night to notify that patient's blood glucose level was 554.  7/17/18 sliding scale insulin had been DC'd as patient was stable but with inconsistent eating habits and A1C was 5.9 on last check.     Nursing also reports that patient has had increase in behaviors recently.  This was also witnessed by me on a recent visit to the SNF where patient was agitated and upset.     08/02/2018 23:35 Fall: Resident was seated in her wheelchair next to the nurses station trying to get into room 301 staff tried to redirect resident and she stood up and lunged to the floor at 1825. no injurys noted, able to move all extremities as usual. Resident was helped up with the 4 point lift. VS stable, blood sugar 554. Residents son Zion notified. Nursing Jazzmine Gray LPN     08/02/2018 23:32 Mood: Resident was very agitated this shift, weepy and combative. Resident was easily angered and chased after staff trying to hit them. Accused staff of trying to kill her and would not let staff help her. Nursing Jazzmine Gray LPIVAN    08/02/2018 23:02 [Recorded as Late Entry on 08/03/2018 00:07]  Lisa Lopez [facility] notified of fall, behaviors and . New order for Novolog 10 units now and recheck at 10pm call MD if over 400. UA/UC ordered for dysuria, urgency/frequency.  "BS recheck at 10pm was 350.      REVIEW OF SYSTEMS:  Unobtainable secondary to cognitive impairment.     Past medical and surgical history reviewed.   Medications reviewed.    /75  Pulse 84  Temp 96.9  F (36.1  C)  Resp 16  Ht 5' 2\" (1.575 m)  Wt 188 lb 12.8 oz (85.6 kg)  SpO2 94%  BMI 34.53 kg/m2  GENERAL APPEARANCE:  Alert, in no distress  RESP:  respiratory effort normal, no respiratory distress  CV:  No LE peripheral edema  ABDOMEN:  Obese, nondistended  M/S:   Gait and station with W/C for mobility, Digits and nails with arthritic changes, reduced muscle mass  SKIN:  Inspection and Palpation of skin and subcutaneous tissue pale, intact  PSYCH:  insight and judgement, memory with significant impairment, affect and mood per baseline, does not follow commands readily           ASSESSMENT/PLAN:     Type 2 diabetes mellitus with complication, with long-term current use of insulin (H)  Vascular dementia with behavioral disturbance  Delusional disorder (H)  Paranoia (H)  Visual hallucinations  Moderate major depression (H)     Can restart sliding scale insulin and monitor blood glucose levels.   UA returned and appearing negative for UTI.  Family updated and asking about UTI - updated on UA results.   Will continue to monitor.      Orders:   1.  Please reactivate sliding scale insulin (that was dc'd on 7/17/18).  Dx: DM2  2.  Accuchecks TID AC.  Dx: DM2    Electronically signed by:  LORI Ricks CNP      Sincerely,        LORI Ricks CNP    "

## 2018-08-03 NOTE — PROGRESS NOTES
[facility]    Chief Complaint   Patient presents with     Nursing Home Acute       HPI:    Jayne Vasquez is a 87 year old  (9/21/1930), who is being seen today for an episodic care visit at [facility].    HPI information obtained from: facility chart records, facility staff and [facility] Epic chart review. Today's concern is:     Type 2 diabetes mellitus with complication, with long-term current use of insulin (H)  Vascular dementia with behavioral disturbance  Delusional disorder (H)  Paranoia (H)  Visual hallucinations  Moderate major depression (H)     Nursing called last night to notify that patient's blood glucose level was 554.  7/17/18 sliding scale insulin had been DC'd as patient was stable but with inconsistent eating habits and A1C was 5.9 on last check.     Nursing also reports that patient has had increase in behaviors recently.  This was also witnessed by me on a recent visit to the SNF where patient was agitated and upset.     08/02/2018 23:35 Fall: Resident was seated in her wheelchair next to the nurses station trying to get into room 301 staff tried to redirect resident and she stood up and lunged to the floor at 1825. no injurys noted, able to move all extremities as usual. Resident was helped up with the 4 point lift. VS stable, blood sugar 554. Residents son Zion notified. Nursing Jazzmine Gray LPN     08/02/2018 23:32 Mood: Resident was very agitated this shift, weepy and combative. Resident was easily angered and chased after staff trying to hit them. Accused staff of trying to kill her and would not let staff help her. Nursing Jazzmine Gray LPN    08/02/2018 23:02 [Recorded as Late Entry on 08/03/2018 00:07]  [facility] notified of fall, behaviors and . New order for Novolog 10 units now and recheck at 10pm call MD if over 400. UA/UC ordered for dysuria, urgency/frequency. BS recheck at 10pm was 350.      REVIEW OF SYSTEMS:  Unobtainable secondary to  "cognitive impairment.     Past medical and surgical history reviewed.   Medications reviewed.    /75  Pulse 84  Temp 96.9  F (36.1  C)  Resp 16  Ht 5' 2\" (1.575 m)  Wt 188 lb 12.8 oz (85.6 kg)  SpO2 94%  BMI 34.53 kg/m2  GENERAL APPEARANCE:  Alert, in no distress  RESP:  respiratory effort normal, no respiratory distress  CV:  No LE peripheral edema  ABDOMEN:  Obese, nondistended  M/S:   Gait and station with W/C for mobility, Digits and nails with arthritic changes, reduced muscle mass  SKIN:  Inspection and Palpation of skin and subcutaneous tissue pale, intact  PSYCH:  insight and judgement, memory with significant impairment, affect and mood per baseline, does not follow commands readily           ASSESSMENT/PLAN:     Type 2 diabetes mellitus with complication, with long-term current use of insulin (H)  Vascular dementia with behavioral disturbance  Delusional disorder (H)  Paranoia (H)  Visual hallucinations  Moderate major depression (H)     Can restart sliding scale insulin and monitor blood glucose levels.   UA returned and appearing negative for UTI.  Family updated and asking about UTI - updated on UA results.   Will continue to monitor.      Orders:   1.  Please reactivate sliding scale insulin (that was dc'd on 7/17/18).  Dx: DM2  2.  Accuchecks TID AC.  Dx: DM2    Electronically signed by:  LORI Ricks CNP  "

## 2018-08-21 NOTE — PROGRESS NOTES
"[facility]    Chief Complaint   Patient presents with     Nursing Home Acute       HPI:    Jayne Vasquez is a 87 year old  (9/21/1930), who is being seen today for an episodic care visit at [facility].    HPI information obtained from: facility chart records, facility staff and [facility] chart review. Today's concern is:     Type 2 diabetes mellitus with complication, with long-term current use of insulin (H)  Vascular dementia with behavioral disturbance  Morbid obesity (H)     Patient taking Basaglar 6 units BID, Novolog sliding scale insulin. Nursing reporting taht patient has had increased blood glucose levels recently.    Blood glucose monitoring:  AMs: 176-218 (mostly 4 units sliding scale insulin)  Lunch: 283-442 (4-10 sliding scale insulin)  Dinner: 110 x 1, 244-405 (2-10 sliding scale insulin)    On average, patient using about 14 units of Novolog per day.   Patient is a poor historian and cannot provide details of increased thirst, etc.     Body mass index is 34.39 kg/(m^2).  Patient has DM2, CKD, HTN    REVIEW OF SYSTEMS:  Unobtainable secondary to cognitive impairment.   Past medical and surgical history reviewed.     /79  Pulse 76  Temp 95.6  F (35.3  C)  Resp 20  Ht 5' 2\" (1.575 m)  Wt 188 lb (85.3 kg)  SpO2 93%  BMI 34.39 kg/m2  GENERAL APPEARANCE:  Alert, in no distress, confused  RESP:  respiratory effort normal, no respiratory distress  CV:  No LE peripheral edema  ABDOMEN: obese, nondistended  M/S:   Gait and station with W/C for mobility, Digits and nails with arthritic changes, reduced muscle mass  SKIN:  Inspection and Palpation of skin and subcutaneous tissue pale, seemingly intact  PSYCH:  insight and judgement, memory with significant impairment, affect and mood normal, follows commands readily           ASSESSMENT/PLAN:     Type 2 diabetes mellitus with complication, with long-term current use of insulin (H)  Vascular dementia with behavioral " disturbance  Morbid obesity (H)     Uncontrolled DM at this time.  Ratio at this time: 12:42, therefore; will increase Basaglar slowly.  Nursing reports that patient's habit of sleeping in and (slightly) inconsistent eating habits is no longer an issue and she eats all meals with the other residents at meal times and usually eats 100% of her meal.   Will monitor Novolog usage with increase in Basaglar and may anticipate further increase in Basaglar if levels remain stable.   Obesity contributing to insulin resistance.     Orders:   1.  Increase Basaglar to 8 units subcutaneous BID.  Dx: DM2    Electronically signed by:  LORI Ricks CNP

## 2018-08-21 NOTE — LETTER
"    8/21/2018        RE: Jayne Vasquez  [address] [address] [place] [address]        [facility]    Chief Complaint   Patient presents with     Nursing Home Acute       HPI:    Jayne Vasquez is a 87 year old  (9/21/1930), who is being seen today for an episodic care visit at [facility].    HPI information obtained from: facility chart records, facility staff and [facility] chart review. Today's concern is:     Type 2 diabetes mellitus with complication, with long-term current use of insulin (H)  Vascular dementia with behavioral disturbance  Morbid obesity (H)     Patient taking Basaglar 6 units BID, Novolog sliding scale insulin. Nursing reporting taht patient has had increased blood glucose levels recently.    Blood glucose monitoring:  AMs: 176-218 (mostly 4 units sliding scale insulin)  Lunch: 283-442 (4-10 sliding scale insulin)  Dinner: 110 x 1, 244-405 (2-10 sliding scale insulin)    On average, patient using about 14 units of Novolog per day.   Patient is a poor historian and cannot provide details of increased thirst, etc.     Body mass index is 34.39 kg/(m^2).  Patient has DM2, CKD, HTN    REVIEW OF SYSTEMS:  Unobtainable secondary to cognitive impairment.   Past medical and surgical history reviewed.     /79  Pulse 76  Temp 95.6  F (35.3  C)  Resp 20  Ht 5' 2\" (1.575 m)  Wt 188 lb (85.3 kg)  SpO2 93%  BMI 34.39 kg/m2  GENERAL APPEARANCE:  Alert, in no distress, confused  RESP:  respiratory effort normal, no respiratory distress  CV:  No LE peripheral edema  ABDOMEN: obese, nondistended  M/S:   Gait and station with W/C for mobility, Digits and nails with arthritic changes, reduced muscle mass  SKIN:  Inspection and Palpation of skin and subcutaneous tissue pale, seemingly intact  PSYCH:  insight and judgement, memory with significant impairment, affect and mood normal, follows commands readily           ASSESSMENT/PLAN:     Type 2 diabetes mellitus with " complication, with long-term current use of insulin (H)  Vascular dementia with behavioral disturbance  Morbid obesity (H)     Uncontrolled DM at this time.  Ratio at this time: 12:42, therefore; will increase Basaglar slowly.  Nursing reports that patient's habit of sleeping in and (slightly) inconsistent eating habits is no longer an issue and she eats all meals with the other residents at meal times and usually eats 100% of her meal.   Will monitor Novolog usage with increase in Basaglar and may anticipate further increase in Basaglar if levels remain stable.   Obesity contributing to insulin resistance.     Orders:   1.  Increase Basaglar to 8 units subcutaneous BID.  Dx: DM2    Electronically signed by:  LORI Ricks CNP        Sincerely,        LORI Ricks CNP

## 2018-08-31 NOTE — LETTER
"    8/31/2018        RE: Jayne Vasquez  [address] [address] [place] [id number]        [facility]    Chief Complaint   Patient presents with     Nursing Home Acute       HPI:    Jayne Vasquez is a 87 year old  (9/21/1930), who is being seen today for an episodic care visit at [facility].    HPI information obtained from: facility chart records, facility staff and [facility] chart review. Today's concern is:     Type 2 diabetes mellitus with complication, with long-term current use of insulin (H)  Vascular dementia with behavioral disturbance     Patient with history of diabetes.  Prior history of not getting up for breakfast (sleeping in), but nursing reporting that patient now has been eating consistently.    Patient is on basaglar 8 units BID, Novolog sliding scale insulin.    Blood glucose monitoring:  AMs: 146-176 (0 ssi)  Lunch: 304-367 (2-4 ssi)  Dinner: 238-461 (2-10 ssi)    On average, patient has been getting roughly 12 units Novolog per day, thus making her ratio 16:12 for long:short-acting insulin    Patient is a poor historian d/t dementia and cannot comment on symptoms.     REVIEW OF SYSTEMS:  Unobtainable secondary to cognitive impairment.     /71  Pulse 69  Temp 97.1  F (36.2  C)  Resp 14  Ht 5' 2\" (1.575 m)  Wt 172 lb 9.6 oz (78.3 kg)  SpO2 93%  BMI 31.57 kg/m2  GENERAL APPEARANCE:  Alert, in no distress  RESP:  respiratory effort normal, no respiratory distress  CV:  No LE peripheral edema  ABDOMEN:  obesenondistended  M/S:   Gait and station with W/C for mobility, Digits and nails at baseline, reduced muscle mass  SKIN:  Inspection and Palpation of skin and subcutaneous tissue pale, intact  PSYCH:  insight and judgement, memory with significant impairment , affect and mood normal, does not follow commands readily           ASSESSMENT/PLAN:     Type 2 diabetes mellitus with complication, with long-term current use of insulin (H)  Vascular dementia with " behavioral disturbance     Uncontrolled DM at this time with hyperglycemia always at lunch and usually at dinner.  Will add TID AC Novolog and keep sliding scale insulin with goal to eventually DC sliding scale insulin as patient has been having more consistent eating pattern. Will monitor.      Orders:   1.  Novolog 3 units subcutaneous TID AC.  Dx: DM2  Update NP if not eating meals consistently.      Electronically signed by:  LORI Ricks CNP      Sincerely,        LORI Ricks CNP

## 2018-08-31 NOTE — PROGRESS NOTES
"[facility]    Chief Complaint   Patient presents with     Nursing Home Acute       HPI:    Jayne Vasquez is a 87 year old  (9/21/1930), who is being seen today for an episodic care visit at [facility].    HPI information obtained from: facility chart records, facility staff and [facility] chart review. Today's concern is:     Type 2 diabetes mellitus with complication, with long-term current use of insulin (H)  Vascular dementia with behavioral disturbance     Patient with history of diabetes.  Prior history of not getting up for breakfast (sleeping in), but nursing reporting that patient now has been eating consistently.    Patient is on basaglar 8 units BID, Novolog sliding scale insulin.    Blood glucose monitoring:  AMs: 146-176 (0 ssi)  Lunch: 304-367 (2-4 ssi)  Dinner: 238-461 (2-10 ssi)    On average, patient has been getting roughly 12 units Novolog per day, thus making her ratio 16:12 for long:short-acting insulin    Patient is a poor historian d/t dementia and cannot comment on symptoms.     REVIEW OF SYSTEMS:  Unobtainable secondary to cognitive impairment.     Past medical and surgical history reviewed.   Medications reviewed    /71  Pulse 69  Temp 97.1  F (36.2  C)  Resp 14  Ht 5' 2\" (1.575 m)  Wt 172 lb 9.6 oz (78.3 kg)  SpO2 93%  BMI 31.57 kg/m2  GENERAL APPEARANCE:  Alert, in no distress  RESP:  respiratory effort normal, no respiratory distress  CV:  No LE peripheral edema  ABDOMEN:  obesenondistended  M/S:   Gait and station with W/C for mobility, Digits and nails at baseline, reduced muscle mass  SKIN:  Inspection and Palpation of skin and subcutaneous tissue pale, intact  PSYCH:  insight and judgement, memory with significant impairment , affect and mood normal, does not follow commands readily           ASSESSMENT/PLAN:     Type 2 diabetes mellitus with complication, with long-term current use of insulin (H)  Vascular dementia with behavioral disturbance "     Uncontrolled DM at this time with hyperglycemia always at lunch and usually at dinner.  Will add TID AC Novolog and keep sliding scale insulin with goal to eventually DC sliding scale insulin as patient has been having more consistent eating pattern. Will monitor.      Orders:   1.  Novolog 3 units subcutaneous TID AC.  Dx: DM2  Update NP if not eating meals consistently.      Electronically signed by:  LORI Ricks CNP

## 2018-08-31 NOTE — LETTER
"    8/31/2018        RE: Jayne Vasquez  [address] [address] [place] [address]        [facility]    Chief Complaint   Patient presents with     Nursing Home Acute       HPI:    Jayne Vasquez is a 87 year old  (9/21/1930), who is being seen today for an episodic care visit at [facility].    HPI information obtained from: facility chart records, facility staff and [facility] chart review. Today's concern is:     Type 2 diabetes mellitus with complication, with long-term current use of insulin (H)  Vascular dementia with behavioral disturbance     Patient with history of diabetes.  Prior history of not getting up for breakfast (sleeping in), but nursing reporting that patient now has been eating consistently.    Patient is on basaglar 8 units BID, Novolog sliding scale insulin.    Blood glucose monitoring:  AMs: 146-176 (0 ssi)  Lunch: 304-367 (2-4 ssi)  Dinner: 238-461 (2-10 ssi)    On average, patient has been getting roughly 12 units Novolog per day, thus making her ratio 16:12 for long:short-acting insulin    Patient is a poor historian d/t dementia and cannot comment on symptoms.     REVIEW OF SYSTEMS:  Unobtainable secondary to cognitive impairment.     Past medical and surgical history reviewed.   Medications reviewed    /71  Pulse 69  Temp 97.1  F (36.2  C)  Resp 14  Ht 5' 2\" (1.575 m)  Wt 172 lb 9.6 oz (78.3 kg)  SpO2 93%  BMI 31.57 kg/m2  GENERAL APPEARANCE:  Alert, in no distress  RESP:  respiratory effort normal, no respiratory distress  CV:  No LE peripheral edema  ABDOMEN:  obesenondistended  M/S:   Gait and station with W/C for mobility, Digits and nails at baseline, reduced muscle mass  SKIN:  Inspection and Palpation of skin and subcutaneous tissue pale, intact  PSYCH:  insight and judgement, memory with significant impairment , affect and mood normal, does not follow commands readily           ASSESSMENT/PLAN:     Type 2 diabetes mellitus with complication, " with long-term current use of insulin (H)  Vascular dementia with behavioral disturbance     Uncontrolled DM at this time with hyperglycemia always at lunch and usually at dinner.  Will add TID AC Novolog and keep sliding scale insulin with goal to eventually DC sliding scale insulin as patient has been having more consistent eating pattern. Will monitor.      Orders:   1.  Novolog 3 units subcutaneous TID AC.  Dx: DM2  Update NP if not eating meals consistently.      Electronically signed by:  LORI Ricks CNP           Sincerely,        LORI Ricks CNP

## 2018-09-11 NOTE — LETTER
"    9/11/2018        RE: Jayne Vasquez  [address] [address] [place] [address]        [facility]    Chief Complaint   Patient presents with     care home Acute       [facility] Medical Record Number:  [id number]    HPI:    Jayne Vasquez is a 87 year old  (9/21/1930), who is being seen today for an episodic care visit at [facility] .   HPI information obtained from: facility chart records, facility staff and [facility] chart review. Today's concern is:  Type 2 diabetes mellitus with complication, with long-term current use of insulin (H)     Patient on basaglar 8 units BID, Novolog 3 units TID AC, sliding scale insulin.    Blood glucose monitoring:  AMs: 106 x 1, 160-213 (0-2 sliding scale insulin used)  Lunch: largely 308-401 (6-10 units sliding scale insulin)  Dinner: 221-330 (4-6 sliding scale insulin used)    Ratio: 16:9 plus sliding scale insulin (roughly 14 units = 23)    REVIEW OF SYSTEMS:  Unobtainable secondary to cognitive impairment.     Past medical and surgical history reviewed.   Medications reviewed     /70  Pulse 83  Temp 97.3  F (36.3  C)  Resp 18  Ht 5' 2\" (1.575 m)  Wt 192 lb 6.4 oz (87.3 kg)  SpO2 93%  BMI 35.19 kg/m2  GENERAL APPEARANCE:  Alert, in no distress  RESP:  respiratory effort normal, no respiratory distress  CV:  No LE peripheral edema  ABDOMEN:  nondistended  M/S:   Gait and station with W/C for mobility, Digits and nails with arthritic changes, reduced muscle mass  SKIN:  Inspection and Palpation of skin and subcutaneous tissue pale, intact  PSYCH:  insight and judgement, memory with significant impairment, affect and mood normal, does not follow commands readily           ASSESSMENT/PLAN:  Type 2 diabetes mellitus with complication, with long-term current use of insulin (H)     Uncontrolled DM2 with hyperglycemia often.    To maintain more even ratio, can increase Basaglar and monitor for AM blood glucose levels not to be hypoglycemic.  " sliding scale insulin to continue.      Orders:  Increase Novolog to 5 units subcutaneous TID AC.  Dx: DM2    Electronically signed by:  LORI Ricks CNP      Sincerely,        LORI Ricks CNP

## 2018-09-11 NOTE — PROGRESS NOTES
"[facility]    Chief Complaint   Patient presents with     custodial Acute       [facility] Medical Record Number:  [id number]    HPI:    Jayne Vasquez is a 87 year old  (9/21/1930), who is being seen today for an episodic care visit at [facility] .   HPI information obtained from: facility chart records, facility staff and [facility] chart review. Today's concern is:  Type 2 diabetes mellitus with complication, with long-term current use of insulin (H)     Patient on basaglar 8 units BID, Novolog 3 units TID AC, sliding scale insulin.    Blood glucose monitoring:  AMs: 106 x 1, 160-213 (0-2 sliding scale insulin used)  Lunch: largely 308-401 (6-10 units sliding scale insulin)  Dinner: 221-330 (4-6 sliding scale insulin used)    Ratio: 16:9 plus sliding scale insulin (roughly 14 units = 23)    REVIEW OF SYSTEMS:  Unobtainable secondary to cognitive impairment.     Past medical and surgical history reviewed.   Medications reviewed     /70  Pulse 83  Temp 97.3  F (36.3  C)  Resp 18  Ht 5' 2\" (1.575 m)  Wt 192 lb 6.4 oz (87.3 kg)  SpO2 93%  BMI 35.19 kg/m2  GENERAL APPEARANCE:  Alert, in no distress  RESP:  respiratory effort normal, no respiratory distress  CV:  No LE peripheral edema  ABDOMEN:  nondistended  M/S:   Gait and station with W/C for mobility, Digits and nails with arthritic changes, reduced muscle mass  SKIN:  Inspection and Palpation of skin and subcutaneous tissue pale, intact  PSYCH:  insight and judgement, memory with significant impairment, affect and mood normal, does not follow commands readily           ASSESSMENT/PLAN:  Type 2 diabetes mellitus with complication, with long-term current use of insulin (H)     Uncontrolled DM2 with hyperglycemia often.    To maintain more even ratio, can increase Basaglar and monitor for AM blood glucose levels not to be hypoglycemic.  sliding scale insulin to continue.      Orders:  Increase Novolog to 5 units subcutaneous " SIMÓN GREGORY.  Dx: DM2    Electronically signed by:  LORI Ricks CNP

## 2018-09-12 NOTE — PROGRESS NOTES
[facility]    Chief Complaint   Patient presents with     snf Regulatory       [facility] Record Number:  [id number]    HPI:    Jayne Vasquez is a 87 year old  (9/21/1930), who is being seen today for a federally mandated E/M visit at [facility] (formerly [facility]) .  HPI information obtained from: facility staff and [facility] Epic chart review.     Today's concerns are:  -  - Resident seen and examined.   -  Reportedly sleep, appetite and BM are fine.   - RN reports paranoia, visual hallucination mainly at night, has strong family support.    - GNP reports ongoing high BGs reading, novolog increased.   --------------------------------  - - Past Medical, social, family histories, medications, and allergies reviewed and updated  - Medications reviewed: in the chart and EHR.   - Case Management:   I have reviewed the care plan and MDS and do agree with the plan. Patient's desire to return to the community is not present.  Information reviewed:  Medications, vital signs, orders, and nursing notes.    MEDICATIONS:  Current Outpatient Prescriptions   Medication Sig Dispense Refill     Acetaminophen (TYLENOL PO) Take 650 mg by mouth 2 times daily Also TID PRN       amLODIPine (NORVASC) 10 MG tablet Take 1 tablet (10 mg) by mouth daily 90 tablet 3     ASPIRIN PO Take 81 mg by mouth daily       BusPIRone HCl (BUSPAR PO) Take 7.5 mg by mouth 2 times daily        Carvedilol (COREG PO) Take 25 mg by mouth 2 times daily (with meals)       Cranberry-Vitamin C-Inulin (UTI-STAT PO) Take by mouth daily       fluticasone (FLONASE) 50 MCG/ACT nasal spray Spray 1 spray into both nostrils At Bedtime       Furosemide (LASIX PO) Take 20 mg by mouth daily       GABAPENTIN PO Take 600 mg by mouth every morning Also 900 QHS       Glycerin-Dimeth-Surfactants (ONE-STEP SKIN CARE LOTION) LOTN Externally apply topically daily       hypromellose (ARTIFICIAL TEARS) 0.5 % SOLN 1 drop 3 times daily as needed for dry eyes        insulin aspart (NOVOLOG FLEXPEN) 100 UNIT/ML injection Inject 5 Units Subcutaneous 3 times daily (with meals)       insulin aspart (NOVOLOG FLEXPEN) 100 UNIT/ML injection Inject Subcutaneous 3 times daily (with meals) Sliding Scale;   If Blood Sugar is 200 to 249, give 2 Units.  If Blood Sugar is 250 to 299, give 4 Units.  If Blood Sugar is 300 to 349, give 6 Units.  If Blood Sugar is 350 to 400, give 8 Units.  If Blood Sugar is greater than 400, give 10 Units.  If Blood Sugar is greater than 401, call MD.       Insulin Glargine (BASAGLAR KWIKPEN SC) Inject 8 Units Subcutaneous 2 times daily        LevETIRAcetam (KEPPRA PO) Take 500 mg by mouth 2 times daily       LISINOPRIL PO Take 20 mg by mouth daily        nitroglycerin (NITROSTAT) 0.4 MG SL tablet Place 1 tablet (0.4 mg) under the tongue every 5 minutes as needed 25 tablet      nystatin (MYCOSTATIN) 237712 UNIT/GM POWD Apply topically 2 times daily as needed        polyethylene glycol (MIRALAX/GLYCOLAX) powder Take 17 g by mouth daily as needed for constipation        QUEtiapine Fumarate (SEROQUEL PO) Take 25 mg by mouth 2 times daily        QUEtiapine Fumarate (SEROQUEL PO) Take 12.5 mg by mouth daily midday       sennosides (SENOKOT) 8.6 MG tablet Take 2 tablets by mouth 2 times daily        traZODone (DESYREL) 50 MG tablet Take 1 tablet (50 mg) by mouth At Bedtime 31 tablet 12     trolamine salicylate (ASPERCREME) 10 % cream Apply topically 2 times daily as needed for moderate pain (also BID)        Venlafaxine HCl (EFFEXOR XR PO) Take 150 mg by mouth At Bedtime        Medications reviewed:   Medications stopped since last EPIC medication reconciliation:       Reason     hydrocortisone (ANUSOL-HC) 2.5 % rectal cream      ketotifen (ZADITOR/REFRESH ANTI-ITCH) 0.025 % SOLN ophthalmic solution      LEVOTHYROXINE SODIUM PO      Multiple Vitamin (TAB-A-LORETTA PO)      mupirocin (BACTROBAN) 2 % ointment      sorbitol 70 % SOLN solution      ROS:  Unobtainable  "secondary to cognitive impairment.     Exam:  Vitals: /70  Pulse 83  Temp 97.3  F (36.3  C)  Resp 18  Ht 5' 2\" (1.575 m)  Wt 192 lb 6.4 oz (87.3 kg)  SpO2 94%  BMI 35.19 kg/m2  BMI= Body mass index is 35.19 kg/(m^2).  GENERAL APPEARANCE:  tired looking.  RESP:  respiratory effort and palpation of chest normal.    CV:  Palpation and auscultation of heart done , regular rate and rhythm, Systolic murmur, rub, or gallop, no pedal edema  ABDOMEN:  normal bowel sounds, soft, nontender, no hepatosplenomegaly or other masses  M/S:   Ambulate using the wheelchair. Self propel  SKIN:  warm. Inspection of skin and subcutaneous tissue baseline, Palpation of skin and subcutaneous tissue baseline  NEURO:   No neuro focal deficit noted  PSYCH:  confused, tired looking.       Lab/Diagnostic data:   CBC RESULTS:   Recent Labs   Lab Test  09/07/18   0809  05/25/18   0745  03/02/18   0815   WBC  5.2   --   5.5   RBC  3.92   --   3.95   HGB  12.1  10.2*  11.8   HCT  36.2   --   37.2   MCV  92   --   94   MCH  30.9   --   29.9   MCHC  33.4   --   31.7   RDW  12.4   --   12.6   PLT  130*   --   142*   Last Basic Metabolic Panel:  Recent Labs   Lab Test  09/07/18   0809  06/08/18   0740  06/01/18   0805   NA   --   141  142   POTASSIUM   --   4.6  4.4   CHLORIDE   --   102  102   ELIZABETH   --   8.4*  8.5   CO2   --   33*  35*   BUN  35*  29  29   CR  1.33*  1.36*  1.32*   GLC  168*  107*  96     Lab Results   Component Value Date    A1C 5.9 05/25/2018    A1C 7.7 12/27/2017       ASSESSMENT/PLAN  Type 2 diabetes mellitus with complication, with long-term current use of insulin (H)   Lab Results   Component Value Date    A1C 5.9 05/25/2018    A1C 7.7 12/27/2017   - novolog recently increased.   - repeat HbA1C. Keep HbA1C b/w 8-9% (per AGS there is a potential harm in lowering A1C to <6.5 % in older adults with diabetes), life expectancy less than 5 years, tight glucose control is note recommended.  -  In this frail elderly adult " with a limited life expectancy, the goal of  the management is to address the hyperglycemia sx if any,  rather than the  BGs numbers per se.      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      Hx of Nontraumatic hemorrhage of cerebral hemisphere, unspecified laterality (H)  - Late effects from fall resulting in hemorrhage of cerebral hemisphere in the past.   - Pt is a WC self propel with feet. ADLs dependant.    - Stable      Moderate Major Depression:  - on meds, stable.     CKD:  - stage 3  - - Avoid nephrotoxic drugs  - Renal dose the medications.      CAD, hx of MI/ HTN  - stable. Continue meds.      Frail elderly  - Significant  Deficits requiring NH placement. Requiring extensive assistance from nursing. Up for meals only o/w spends the day resting in bed     Vascular dementia with behavior disturbance  - Continue to anticipate needs. Chronic condition, ongoing decline expected.   -  Continue to provide redirection and reassurance as needed. Maintain safe living situation with goals focused on comfort.    Orders:  - See above, otherwise, continue the rest of the current POC.       Electronically signed by:  Jesse Gonzalez MD

## 2018-09-13 NOTE — LETTER
9/13/2018        RE: Jayne Vasquez  [address] [address] [place] [id number]          [facility]    Chief Complaint   Patient presents with     correction Regulatory       [facility] Record Number:  [id number]    HPI:    Jayne Vasquez is a 87 year old  (9/21/1930), who is being seen today for a federally mandated E/M visit at [facility] .  HPI information obtained from: facility staff and [facility] Epic chart review.     Today's concerns are:  -  - Resident seen and examined.   -  Reportedly sleep, appetite and BM are fine.   - RN reports paranoia, visual hallucination mainly at night, has strong family support.    - GNP reports ongoing high BGs reading, novolog increased.   --------------------------------  - - Past Medical, social, family histories, medications, and allergies reviewed and updated  - Medications reviewed: in the chart and EHR.   - Case Management:   I have reviewed the care plan and MDS and do agree with the plan. Patient's desire to return to the community is not present.  Information reviewed:  Medications, vital signs, orders, and nursing notes.    MEDICATIONS:  Current Outpatient Prescriptions   Medication Sig Dispense Refill     Acetaminophen (TYLENOL PO) Take 650 mg by mouth 2 times daily Also TID PRN       amLODIPine (NORVASC) 10 MG tablet Take 1 tablet (10 mg) by mouth daily 90 tablet 3     ASPIRIN PO Take 81 mg by mouth daily       BusPIRone HCl (BUSPAR PO) Take 7.5 mg by mouth 2 times daily        Carvedilol (COREG PO) Take 25 mg by mouth 2 times daily (with meals)       Cranberry-Vitamin C-Inulin (UTI-STAT PO) Take by mouth daily       fluticasone (FLONASE) 50 MCG/ACT nasal spray Spray 1 spray into both nostrils At Bedtime       Furosemide (LASIX PO) Take 20 mg by mouth daily       GABAPENTIN PO Take 600 mg by mouth every morning Also 900 QHS       Glycerin-Dimeth-Surfactants (ONE-STEP SKIN CARE LOTION) LOTN Externally apply topically daily        hypromellose (ARTIFICIAL TEARS) 0.5 % SOLN 1 drop 3 times daily as needed for dry eyes       insulin aspart (NOVOLOG FLEXPEN) 100 UNIT/ML injection Inject 5 Units Subcutaneous 3 times daily (with meals)       insulin aspart (NOVOLOG FLEXPEN) 100 UNIT/ML injection Inject Subcutaneous 3 times daily (with meals) Sliding Scale;   If Blood Sugar is 200 to 249, give 2 Units.  If Blood Sugar is 250 to 299, give 4 Units.  If Blood Sugar is 300 to 349, give 6 Units.  If Blood Sugar is 350 to 400, give 8 Units.  If Blood Sugar is greater than 400, give 10 Units.  If Blood Sugar is greater than 401, call MD.       Insulin Glargine (BASAGLAR KWIKPEN SC) Inject 8 Units Subcutaneous 2 times daily        LevETIRAcetam (KEPPRA PO) Take 500 mg by mouth 2 times daily       LISINOPRIL PO Take 20 mg by mouth daily        nitroglycerin (NITROSTAT) 0.4 MG SL tablet Place 1 tablet (0.4 mg) under the tongue every 5 minutes as needed 25 tablet      nystatin (MYCOSTATIN) 213265 UNIT/GM POWD Apply topically 2 times daily as needed        polyethylene glycol (MIRALAX/GLYCOLAX) powder Take 17 g by mouth daily as needed for constipation        QUEtiapine Fumarate (SEROQUEL PO) Take 25 mg by mouth 2 times daily        QUEtiapine Fumarate (SEROQUEL PO) Take 12.5 mg by mouth daily midday       sennosides (SENOKOT) 8.6 MG tablet Take 2 tablets by mouth 2 times daily        traZODone (DESYREL) 50 MG tablet Take 1 tablet (50 mg) by mouth At Bedtime 31 tablet 12     trolamine salicylate (ASPERCREME) 10 % cream Apply topically 2 times daily as needed for moderate pain (also BID)        Venlafaxine HCl (EFFEXOR XR PO) Take 150 mg by mouth At Bedtime        Medications reviewed:   Medications stopped since last EPIC medication reconciliation:       Reason     hydrocortisone (ANUSOL-HC) 2.5 % rectal cream      ketotifen (ZADITOR/REFRESH ANTI-ITCH) 0.025 % SOLN ophthalmic solution      LEVOTHYROXINE SODIUM PO      Multiple Vitamin (TAB-A-LORETTA PO)       "mupirocin (BACTROBAN) 2 % ointment      sorbitol 70 % SOLN solution      ROS:  Unobtainable secondary to cognitive impairment.     Exam:  Vitals: /70  Pulse 83  Temp 97.3  F (36.3  C)  Resp 18  Ht 5' 2\" (1.575 m)  Wt 192 lb 6.4 oz (87.3 kg)  SpO2 94%  BMI 35.19 kg/m2  BMI= Body mass index is 35.19 kg/(m^2).  GENERAL APPEARANCE:  tired looking.  RESP:  respiratory effort and palpation of chest normal.    CV:  Palpation and auscultation of heart done , regular rate and rhythm, Systolic murmur, rub, or gallop, no pedal edema  ABDOMEN:  normal bowel sounds, soft, nontender, no hepatosplenomegaly or other masses  M/S:   Ambulate using the wheelchair. Self propel  SKIN:  warm. Inspection of skin and subcutaneous tissue baseline, Palpation of skin and subcutaneous tissue baseline  NEURO:   No neuro focal deficit noted  PSYCH:  confused, tired looking.       Lab/Diagnostic data:   CBC RESULTS:   Recent Labs   Lab Test  09/07/18   0809  05/25/18   0745  03/02/18   0815   WBC  5.2   --   5.5   RBC  3.92   --   3.95   HGB  12.1  10.2*  11.8   HCT  36.2   --   37.2   MCV  92   --   94   MCH  30.9   --   29.9   MCHC  33.4   --   31.7   RDW  12.4   --   12.6   PLT  130*   --   142*   Last Basic Metabolic Panel:  Recent Labs   Lab Test  09/07/18   0809  06/08/18   0740  06/01/18   0805   NA   --   141  142   POTASSIUM   --   4.6  4.4   CHLORIDE   --   102  102   ELIZABETH   --   8.4*  8.5   CO2   --   33*  35*   BUN  35*  29  29   CR  1.33*  1.36*  1.32*   GLC  168*  107*  96     Lab Results   Component Value Date    A1C 5.9 05/25/2018    A1C 7.7 12/27/2017       ASSESSMENT/PLAN  Type 2 diabetes mellitus with complication, with long-term current use of insulin (H)   Lab Results   Component Value Date    A1C 5.9 05/25/2018    A1C 7.7 12/27/2017   - novolog recently increased.   - repeat HbA1C. Keep HbA1C b/w 8-9% (per AGS there is a potential harm in lowering A1C to <6.5 % in older adults with diabetes), life expectancy less " than 5 years, tight glucose control is note recommended.  -  In this frail elderly adult with a limited life expectancy, the goal of  the management is to address the hyperglycemia sx if any,  rather than the  BGs numbers per se.      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      Hx of Nontraumatic hemorrhage of cerebral hemisphere, unspecified laterality (H)  - Late effects from fall resulting in hemorrhage of cerebral hemisphere in the past.   - Pt is a WC self propel with feet. ADLs dependant.    - Stable      Moderate Major Depression:  - on meds, stable.     CKD:  - stage 3  - - Avoid nephrotoxic drugs  - Renal dose the medications.      CAD, hx of MI/ HTN  - stable. Continue meds.      Frail elderly  - Significant  Deficits requiring NH placement. Requiring extensive assistance from nursing. Up for meals only o/w spends the day resting in bed     Vascular dementia with behavior disturbance  - Continue to anticipate needs. Chronic condition, ongoing decline expected.   -  Continue to provide redirection and reassurance as needed. Maintain safe living situation with goals focused on comfort.    Orders:  - See above, otherwise, continue the rest of the current POC.       Electronically signed by:  Jesse Gonzalez MD        Sincerely,        Jesse Gonzalez MD

## 2018-10-26 NOTE — PROGRESS NOTES
[place] [facility]    Chief Complaint   Patient presents with     group home Acute       [facility] Record Number:  [id number]  Place of Service where encounter took place:  [facility] ([facility]) [[id number]]    HPI:    Jayne Vasquez is a 88 year old  (9/21/1930), who is being seen today for an episodic care visit.  HPI information obtained from: facility chart records, facility staff and [facility] chart review.Today's concern is:  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)  Patient is on basaglar 8 units BID, Novolog 5 units TID ACE and sliding scale insulin.  Patient has failed discontinue of sliding scale insulin multiple times in past d/t fluctuations in blood glucose levels.  Blood glucose monitoring:  AM: 150-200 (0-2 sliding scale insulin)  Lunch: 260-340 ( mostly 4 units sliding scale insulin)  Dinner: 140-260 mostly (0-2 sliding scale insulin)    Patient is also on gabapentin 600 mg qAM, 900 mg qPM  Patient often states her feet hurt her (or staff ascertain this as she is a poor historian and unable to report a lot d/t cognitive impairment).    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  Patient is inconsolable at these times; has failed GDR multiple times in the past and family has noted in the past that comfort is goal so OK not to attempt GDRs.     Patient currently on buspirone 7.5 mg twice daily, Effexor X are 150 mg nightly, Seroquel 12.5 mg in the afternoon with 25 mg twice daily (am, pm), trazodone 50 mg nightly,  Patient also on gabapentin for neuropathy milligrams every morning and 900 mg nightly.    Nursing notes indicate one instance recent V of weepiness but otherwise no  negative behaviors.    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 p.m., ASA 81 mg daily (also for history of cva), carvedilol 25 mg twice daily, Lasix 20 mg every morning (no KCl supplement, last K 4.6), lisinopril 20 mg daily, nitroglycerin as needed    BPs /50-70s  HRs 80s mostly  Patient unable to comment on symptoms of CP, HA, lightheadedness d/t cognitive impairment     Weights:  10/1/18 - 191.2  10/9/18: 196.2  10/23/18: 197    CKD stage 3, due to effects of DM II and HTN GFR 9/2012 51; 4/2012 59; 12/11 53; 10/2011 59  Last 3 BMPs:   6/1/18: BUN 29, Creat 1.32, GFR 38  6/8/18: BUN 29, Creat 1.36, GFR 37  9/7/18: BUN 35, Creat 1.33, GFR 38        ALLERGIES: Dye [contrast dye]; Fluoxetine; Iodine-131; Methocarbamol; Paroxetine; and Penicillins  Past Medical, Surgical, Family and Social History reviewed and updated in [facility].    Current Outpatient Prescriptions   Medication Sig Dispense Refill     Acetaminophen (TYLENOL PO) Take 650 mg by mouth 2 times daily Also TID PRN       AmLODIPine Besylate (NORVASC PO) Take 7.5 mg by mouth At Bedtime       ASPIRIN PO Take 81 mg by mouth daily       BusPIRone HCl (BUSPAR PO) Take 7.5 mg by mouth 2 times daily        Carvedilol (COREG PO) Take 25 mg by mouth 2 times daily (with meals)       Cranberry-Vitamin C-Inulin (UTI-STAT PO) Take by mouth daily       fluticasone (FLONASE) 50 MCG/ACT nasal spray Spray 1 spray into both nostrils At Bedtime       Furosemide (LASIX PO) Take 20 mg by mouth daily       GABAPENTIN PO Take 600 mg by mouth 2 times daily        Glycerin-Dimeth-Surfactants (ONE-STEP SKIN CARE LOTION) LOTN Externally apply topically daily       hypromellose  (ARTIFICIAL TEARS) 0.5 % SOLN 1 drop 3 times daily as needed for dry eyes       insulin aspart (NOVOLOG FLEXPEN) 100 UNIT/ML injection Inject 5 Units Subcutaneous 3 times daily (with meals)       insulin aspart (NOVOLOG FLEXPEN) 100 UNIT/ML injection Inject Subcutaneous 3 times daily (with meals) Sliding Scale;   If Blood Sugar is 200 to 249, give 2 Units.  If Blood Sugar is 250 to 299, give 4 Units.  If Blood Sugar is 300 to 349, give 6 Units.  If Blood Sugar is 350 to 400, give 8 Units.  If Blood Sugar is greater than 400, give 10 Units.  If Blood Sugar is greater than 401, call MD.       Insulin Glargine (BASAGLAR KWIKPEN SC) Inject 8 Units Subcutaneous 2 times daily        LevETIRAcetam (KEPPRA PO) Take 500 mg by mouth 2 times daily       LISINOPRIL PO Take 20 mg by mouth daily        nitroglycerin (NITROSTAT) 0.4 MG SL tablet Place 1 tablet (0.4 mg) under the tongue every 5 minutes as needed 25 tablet      nystatin (MYCOSTATIN) 676031 UNIT/GM POWD Apply topically 2 times daily as needed        polyethylene glycol (MIRALAX/GLYCOLAX) powder Take 17 g by mouth daily as needed for constipation        QUEtiapine Fumarate (SEROQUEL PO) Take 25 mg by mouth 2 times daily        QUEtiapine Fumarate (SEROQUEL PO) Take 12.5 mg by mouth daily midday       sennosides (SENOKOT) 8.6 MG tablet Take 2 tablets by mouth 2 times daily        traZODone (DESYREL) 50 MG tablet Take 1 tablet (50 mg) by mouth At Bedtime 31 tablet 12     trolamine salicylate (ASPERCREME) 10 % cream Apply topically 2 times daily as needed for moderate pain (also BID)        Venlafaxine HCl (EFFEXOR XR PO) Take 150 mg by mouth At Bedtime        Medications reviewed:  Medications reconciled to facility chart and changes were made to reflect current medications as identified as above med list. Below are the changes that were made:   Medications stopped since last EPIC medication reconciliation:   There are no discontinued medications.    Medications started  "since last [facility] medication reconciliation:  No orders of the defined types were placed in this encounter.    REVIEW OF SYSTEMS:  Unobtainable secondary to cognitive impairment.     Physical Exam:  /77  Pulse 73  Temp 95.2  F (35.1  C)  Resp 18  Ht 5' 2\" (1.575 m)  Wt 197 lb (89.4 kg)  SpO2 90%  BMI 36.03 kg/m2  GENERAL APPEARANCE:  Asleep at start of visit but awoke easily to voice and presence, drifted off to sleep again, in no distress  RESP:  respiratory effort and palpation of chest normal, auscultation of lungs clear, no respiratory distress  CV:  Palpation and auscultation of heart done , rate and rhythm regular, no murmur, no LE peripheral edema  ABDOMEN:  Obese, normal bowel sounds, soft, nontender, MAJOR for hepatosplenomegaly or other masses  M/S:   Gait and station with W/C for mobility, Digits and nails with arthritic changes, reduced muscle mass  SKIN:  Inspection and Palpation of skin and subcutaneous tissue pale, intact  PSYCH:  insight and judgement, memory with significant impairment, affect and mood normal, does not consistently follow commands readily         Recent Labs:   CBC RESULTS:   Recent Labs   Lab Test  09/07/18   0809  05/25/18   0745  03/02/18   0815   WBC  5.2   --   5.5   RBC  3.92   --   3.95   HGB  12.1  10.2*  11.8   HCT  36.2   --   37.2   MCV  92   --   94   MCH  30.9   --   29.9   MCHC  33.4   --   31.7   RDW  12.4   --   12.6   PLT  130*   --   142*       Last Basic Metabolic Panel:  Recent Labs   Lab Test  09/07/18   0809  06/08/18   0740  06/01/18   0805   NA   --   141  142   POTASSIUM   --   4.6  4.4   CHLORIDE   --   102  102   ELIZABETH   --   8.4*  8.5   CO2   --   33*  35*   BUN  35*  29  29   CR  1.33*  1.36*  1.32*   GLC  168*  107*  96       Lab Results   Component Value Date    A1C 5.9 05/25/2018    A1C 7.7 12/27/2017         Assessment/Plan:  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)  May anticipate increase in Novolog but can monitor.  Can " order A1C as last A1C >13 weeks ago.   Will attempt to lower gabapentin as max dosing for gabapentin is 1400 mg daily when GDR <60.  Patient's GFR is 38.    Will monitor.     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    Monitor closely.     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  Can decrease CCB as weight have been increasing so would not want to adjust diuretic at this time.  Can check BMP for monitoring for need to adjust diuretic.   Today patient appears euvolemic.   BP goals are  <140/90 mm Hg.This is higher than ACC and AHA recommendations due to goals of care, risk for hypotension, risk of dizziness and falls, risk of tissue/cerebral hypoperfusion and frailty.      CKD stage 3, due to effects of DM II and HTN GFR 9/2012 51; 4/2012 59; 12/11 53; 10/2011 59  Will order BMP for monitoring as may anticipate change in Lasix dosing.       Orders:  1.  Decrease Gabapentin to 600 mg po BID.  Dx: neuropathy  2.  BMP on Friday, 11/2/18.  Dx: CKD3  3.  Decrease Amlodipine to 7.5 mg po at bedtime.  Dx: HTN  4.  BP & HR every day at least 1 hour after meds x 7 days.  Update NP with results please.  Dx: HTN  5.  Hgb A1C on Friday 11/2/18.  Dx: DM2    Electronically signed by  LORI Ricks CNP

## 2018-10-26 NOTE — LETTER
10/26/2018        RE: Jayne Vasquez  [address] [address] [place] [id number]        [place] [facility]    Chief Complaint   Patient presents with     [facility] Acute       [place] Medical Record Number:  [id number]  Place of Service where encounter took place:  [facility] ([facility]) [[id number]]    HPI:    Jayne Vasquez is a 88 year old  (9/21/1930), who is being seen today for an episodic care visit.  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  Patient has failed discontinue of sliding scale insulin multiple times in past d/t fluctuations in blood glucose levels.  Blood glucose monitoring:  AM: 150-200 (0-2 sliding scale insulin)  Lunch: 260-340 ( mostly 4 units sliding scale insulin)  Dinner: 140-260 mostly (0-2 sliding scale insulin)    Patient is also on gabapentin 600 mg qAM, 900 mg qPM  Patient often states her feet hurt her (or staff ascertain this as she is a poor historian and unable to report a lot d/t cognitive impairment).    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  Patient is inconsolable at these times; has failed GDR multiple times in the past and family has noted in the past that comfort is goal so OK not to attempt GDRs.     Patient currently on buspirone 7.5 mg twice daily, Effexor X are 150 mg nightly, Seroquel 12.5 mg in the afternoon with 25 mg twice daily (am, pm), trazodone 50 mg nightly,  Patient also on gabapentin for neuropathy milligrams every morning and 900 mg  nightly.    Nursing notes indicate one instance recent V of weepiness but otherwise no negative behaviors.    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 p.m., ASA 81 mg daily (also for history of cva), carvedilol 25 mg twice daily, Lasix 20 mg every morning (no KCl supplement, last K 4.6), lisinopril 20 mg daily, nitroglycerin as needed    BPs /50-70s  HRs 80s mostly  Patient unable to comment on symptoms of CP, HA, lightheadedness d/t cognitive impairment     Weights:  10/1/18 - 191.2  10/9/18: 196.2  10/23/18: 197    CKD stage 3, due to effects of DM II and HTN GFR 9/2012 51; 4/2012 59; 12/11 53; 10/2011 59  Last 3 BMPs:   6/1/18: BUN 29, Creat 1.32, GFR 38  6/8/18: BUN 29, Creat 1.36, GFR 37  9/7/18: BUN 35, Creat 1.33, GFR 38        ALLERGIES: Dye [contrast dye]; Fluoxetine; Iodine-131; Methocarbamol; Paroxetine; and Penicillins  Past Medical, Surgical, Family and Social History reviewed and updated in [facility].    Current Outpatient Prescriptions   Medication Sig Dispense Refill     Acetaminophen (TYLENOL PO) Take 650 mg by mouth 2 times daily Also TID PRN       AmLODIPine Besylate (NORVASC PO) Take 7.5 mg by mouth At Bedtime       ASPIRIN PO Take 81 mg by mouth daily       BusPIRone HCl (BUSPAR PO) Take 7.5 mg by mouth 2 times daily        Carvedilol (COREG PO) Take 25 mg by mouth 2 times daily (with meals)       Cranberry-Vitamin C-Inulin (UTI-STAT PO) Take by mouth daily       fluticasone (FLONASE) 50 MCG/ACT nasal spray Spray 1 spray into both nostrils At Bedtime       Furosemide (LASIX PO) Take 20 mg by mouth daily       GABAPENTIN PO Take 600 mg by mouth 2 times daily        Glycerin-Dimeth-Surfactants  (ONE-STEP SKIN CARE LOTION) LOTN Externally apply topically daily       hypromellose (ARTIFICIAL TEARS) 0.5 % SOLN 1 drop 3 times daily as needed for dry eyes       insulin aspart (NOVOLOG FLEXPEN) 100 UNIT/ML injection Inject 5 Units Subcutaneous 3 times daily (with meals)       insulin aspart (NOVOLOG FLEXPEN) 100 UNIT/ML injection Inject Subcutaneous 3 times daily (with meals) Sliding Scale;   If Blood Sugar is 200 to 249, give 2 Units.  If Blood Sugar is 250 to 299, give 4 Units.  If Blood Sugar is 300 to 349, give 6 Units.  If Blood Sugar is 350 to 400, give 8 Units.  If Blood Sugar is greater than 400, give 10 Units.  If Blood Sugar is greater than 401, call MD.       Insulin Glargine (BASAGLAR KWIKPEN SC) Inject 8 Units Subcutaneous 2 times daily        LevETIRAcetam (KEPPRA PO) Take 500 mg by mouth 2 times daily       LISINOPRIL PO Take 20 mg by mouth daily        nitroglycerin (NITROSTAT) 0.4 MG SL tablet Place 1 tablet (0.4 mg) under the tongue every 5 minutes as needed 25 tablet      nystatin (MYCOSTATIN) 493533 UNIT/GM POWD Apply topically 2 times daily as needed        polyethylene glycol (MIRALAX/GLYCOLAX) powder Take 17 g by mouth daily as needed for constipation        QUEtiapine Fumarate (SEROQUEL PO) Take 25 mg by mouth 2 times daily        QUEtiapine Fumarate (SEROQUEL PO) Take 12.5 mg by mouth daily midday       sennosides (SENOKOT) 8.6 MG tablet Take 2 tablets by mouth 2 times daily        traZODone (DESYREL) 50 MG tablet Take 1 tablet (50 mg) by mouth At Bedtime 31 tablet 12     trolamine salicylate (ASPERCREME) 10 % cream Apply topically 2 times daily as needed for moderate pain (also BID)        Venlafaxine HCl (EFFEXOR XR PO) Take 150 mg by mouth At Bedtime        Medications reviewed:  Medications reconciled to facility chart and changes were made to reflect current medications as identified as above med list. Below are the changes that were made:   Medications stopped since last EPIC  "medication reconciliation:   There are no discontinued medications.    Medications started since last EPIC medication reconciliation:  No orders of the defined types were placed in this encounter.    REVIEW OF SYSTEMS:  Unobtainable secondary to cognitive impairment.     Physical Exam:  /77  Pulse 73  Temp 95.2  F (35.1  C)  Resp 18  Ht 5' 2\" (1.575 m)  Wt 197 lb (89.4 kg)  SpO2 90%  BMI 36.03 kg/m2  GENERAL APPEARANCE:  Asleep at start of visit but awoke easily to voice and presence, drifted off to sleep again, in no distress  RESP:  respiratory effort and palpation of chest normal, auscultation of lungs clear, no respiratory distress  CV:  Palpation and auscultation of heart done , rate and rhythm regular, no murmur, no LE peripheral edema  ABDOMEN:  Obese, normal bowel sounds, soft, nontender, MAJOR for hepatosplenomegaly or other masses  M/S:   Gait and station with W/C for mobility, Digits and nails with arthritic changes, reduced muscle mass  SKIN:  Inspection and Palpation of skin and subcutaneous tissue pale, intact  PSYCH:  insight and judgement, memory with significant impairment, affect and mood normal, does not consistently follow commands readily         Recent Labs:   CBC RESULTS:   Recent Labs   Lab Test  09/07/18   0809  05/25/18   0745  03/02/18   0815   WBC  5.2   --   5.5   RBC  3.92   --   3.95   HGB  12.1  10.2*  11.8   HCT  36.2   --   37.2   MCV  92   --   94   MCH  30.9   --   29.9   MCHC  33.4   --   31.7   RDW  12.4   --   12.6   PLT  130*   --   142*       Last Basic Metabolic Panel:  Recent Labs   Lab Test  09/07/18   0809  06/08/18   0740  06/01/18   0805   NA   --   141  142   POTASSIUM   --   4.6  4.4   CHLORIDE   --   102  102   ELIZABETH   --   8.4*  8.5   CO2   --   33*  35*   BUN  35*  29  29   CR  1.33*  1.36*  1.32*   GLC  168*  107*  96       Lab Results   Component Value Date    A1C 5.9 05/25/2018    A1C 7.7 12/27/2017         Assessment/Plan:  Diabetes mellitus type 2 " with neurological manifestations (H)  May anticipate increase in Novolog but can monitor.  Can order A1C as last A1C >13 weeks ago.   Will attempt to lower gabapentin as max dosing for gabapentin is 1400 mg daily when GDR <60.  Patient's GFR is 38.    Will monitor.     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    Monitor closely.     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  Can decrease CCB as weight have been increasing so would not want to adjust diuretic at this time.  Can check BMP for monitoring for need to adjust diuretic.   Today patient appears euvolemic.   BP goals are  <140/90 mm Hg.This is higher than ACC and AHA recommendations due to goals of care, risk for hypotension, risk of dizziness and falls, risk of tissue/cerebral hypoperfusion and frailty.      CKD stage 3, due to effects of DM II and HTN GFR 9/2012 51; 4/2012 59; 12/11 53; 10/2011 59  Will order BMP for monitoring as may anticipate change in Lasix dosing.       Orders:  1.  Decrease Gabapentin to 600 mg po BID.  Dx: neuropathy  2.  BMP on Friday, 11/2/18.  Dx: CKD3  3.  Decrease Amlodipine to 7.5 mg po at bedtime.  Dx: HTN  4.  BP & HR every day at least 1 hour after meds x 7 days.  Update NP with results please.  Dx: HTN  5.  Hgb A1C on Friday 11/2/18.  Dx: DM2    Electronically signed by  LORI Ricks CNP                      Sincerely,        Lisa FONSECA  LORI Lopez CNP

## 2018-11-02 NOTE — PROGRESS NOTES
[facility]    Chief Complaint   Patient presents with     care home Acute       [place] Medical Record Number:  [id number]  Place of Service where encounter took place:  [facility] ([facility] [[id number]]    HPI:    Jayne Vasquez is a 88 year old  (9/21/1930), who is being seen today for an episodic care visit.  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   Last few BMPs:   6/1/18: BUN 29, Creat 1.32, GFR 38  6/8/18: BUN 29, Creat 1.36, GFR 37  9/7/18: BUN 35, Creat 1.33, GFR 38  11/2/18: BUN 36, Creat 1.53, GFR 32    Patient has PMH of significant cardiac disease (see above) including NSTEMI, HFpEF Grade I, HTN, etc.  She is currently on amlodipine 7.5 mg daily, ASA 81 mg daily, lasix 20 mg daily, lisinopril 20 mg daily, nitroglycerin PRN.     Patient is met today in her [facility] LTC room where she denies pain, CP, HA, lightheadedness. She is a poor historian, though, d/t severe cognitive impairment.     BPs 120-160s/70s  HRs 90s mostly    Weights:  10/9/18 - 196.2 lbs  10/23/18 - 197  11/1/18 - 199.8 lbs      ALLERGIES: Dye [contrast dye]; Fluoxetine; Iodine-131; Methocarbamol; Paroxetine; and Penicillins  Past Medical, Surgical, Family and Social History reviewed and updated in [facility].    Current Outpatient Prescriptions   Medication Sig Dispense Refill     Acetaminophen (TYLENOL PO) Take 650 mg by mouth 2 times  daily Also TID PRN       AmLODIPine Besylate (NORVASC PO) Take 7.5 mg by mouth At Bedtime       ASPIRIN PO Take 81 mg by mouth daily       Blood Glucose Monitoring Suppl MISC 3 times daily (before meals)       BusPIRone HCl (BUSPAR PO) Take 7.5 mg by mouth 2 times daily        Carvedilol (COREG PO) Take 25 mg by mouth 2 times daily (with meals)       Cranberry-Vitamin C-Inulin (UTI-STAT PO) Take by mouth daily       fluticasone (FLONASE) 50 MCG/ACT nasal spray Spray 1 spray into both nostrils At Bedtime       Furosemide (LASIX PO) Take 10 mg by mouth daily        GABAPENTIN PO Take 600 mg by mouth 2 times daily        Glycerin-Dimeth-Surfactants (ONE-STEP SKIN CARE LOTION) LOTN Externally apply topically daily       hypromellose (ARTIFICIAL TEARS) 0.5 % SOLN 1 drop 3 times daily as needed for dry eyes       insulin aspart (NOVOLOG FLEXPEN) 100 UNIT/ML injection Inject 5 Units Subcutaneous 3 times daily (with meals)       insulin aspart (NOVOLOG FLEXPEN) 100 UNIT/ML injection Inject Subcutaneous 3 times daily (with meals) Sliding Scale;   If Blood Sugar is 200 to 249, give 2 Units.  If Blood Sugar is 250 to 299, give 4 Units.  If Blood Sugar is 300 to 349, give 6 Units.  If Blood Sugar is 350 to 400, give 8 Units.  If Blood Sugar is greater than 400, give 10 Units.  If Blood Sugar is greater than 401, call MD.       Insulin Glargine (BASAGLAR KWIKPEN SC) Inject 8 Units Subcutaneous 2 times daily        LevETIRAcetam (KEPPRA PO) Take 500 mg by mouth 2 times daily       LISINOPRIL PO Take 20 mg by mouth daily        nitroglycerin (NITROSTAT) 0.4 MG SL tablet Place 1 tablet (0.4 mg) under the tongue every 5 minutes as needed 25 tablet      nystatin (MYCOSTATIN) 874241 UNIT/GM POWD Apply topically 2 times daily as needed        polyethylene glycol (MIRALAX/GLYCOLAX) powder Take 17 g by mouth daily as needed for constipation        QUEtiapine Fumarate (SEROQUEL PO) Take 25 mg by mouth 2 times daily        QUEtiapine  "Fumarate (SEROQUEL PO) Take 12.5 mg by mouth daily midday       sennosides (SENOKOT) 8.6 MG tablet Take 2 tablets by mouth 2 times daily        traZODone (DESYREL) 50 MG tablet Take 1 tablet (50 mg) by mouth At Bedtime 31 tablet 12     trolamine salicylate (ASPERCREME) 10 % cream Apply topically 2 times daily as needed for moderate pain (also BID)        Venlafaxine HCl (EFFEXOR XR PO) Take 150 mg by mouth At Bedtime        Medications reviewed:  Medications reconciled to facility chart and changes were made to reflect current medications as identified as above med list. Below are the changes that were made:   Medications stopped since last EPIC medication reconciliation:   There are no discontinued medications.    Medications started since last [facility] medication reconciliation:  No orders of the defined types were placed in this encounter.    REVIEW OF SYSTEMS:  Unobtainable secondary to cognitive impairment.     Physical Exam:  /73  Pulse 98  Temp 96.7  F (35.9  C)  Resp 16  Ht 5' 2\" (1.575 m)  Wt 199 lb 12.8 oz (90.6 kg)  SpO2 91%  BMI 36.54 kg/m2  GENERAL APPEARANCE:  Asleep, awoke to voice and presence, confused, in no distress  RESP:  respiratory effort and palpation of chest normal, auscultation of lungs clear, no respiratory distress  CV:  Palpation and auscultation of heart done , rate and rhythm regular, no murmur, no LE peripheral edema  ABDOMEN: obese, normal bowel sounds, soft, nontender,   M/S:   Gait and station with W/C for mobility, Digits and nails with arthritic changes, reduced muscle mass  SKIN:  Inspection and Palpation of skin and subcutaneous tissue pale, seemingly intact  PSYCH:  insight and judgement, memory with severe impairment, affect and mood hypoactive today, does not follow commands readily         Recent Labs:   CBC RESULTS:   Recent Labs   Lab Test  09/07/18   0809  05/25/18   0745  03/02/18   0815   WBC  5.2   --   5.5   RBC  3.92   --   3.95   HGB  12.1  10.2*  11.8 "   HCT  36.2   --   37.2   MCV  92   --   94   MCH  30.9   --   29.9   MCHC  33.4   --   31.7   RDW  12.4   --   12.6   PLT  130*   --   142*       Last Basic Metabolic Panel:  Recent Labs   Lab Test  11/02/18   0830  09/07/18   0809  06/08/18   0740   NA  143   --   141   POTASSIUM  4.8   --   4.6   CHLORIDE  104   --   102   ELIZABETH  8.8   --   8.4*   CO2  30   --   33*   BUN  36*  35*  29   CR  1.53*  1.33*  1.36*   GLC  124*  168*  107*       Liver Function Studies -   Recent Labs   Lab Test  06/17/15   1809  06/17/15   1215   PROTTOTAL  7.6  7.4   ALBUMIN  3.6  3.5   BILITOTAL  0.2  0.2   ALKPHOS  91  89   AST  22  23   ALT  23  24       TSH   Date Value Ref Range Status   10/22/2013 2.04 0.4 - 5.0 mU/L Final   10/07/2011 2.58 0.4 - 5.0 mU/L Final   ]    Lab Results   Component Value Date    A1C 7.0 11/02/2018    A1C 5.9 05/25/2018         Assessment/Plan:     Diastolic dysfunction, left ventricle  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  Increase in weight likely nutritional as patient has obese abdomen (which may be worse since than baseline).    Patient is not on BB.  No reason to start at this time but if patient is becoming more hypertensive with reduced Lasix, could add BB as patient has History of NSTEMI.    If kidney function does not improve, could discontinue ACEI and monitor for need.   Will monitor closely.     Orders:  1.  Decrease Lasix to 10 mg po every day.  Dx: CHF  2.  BMP recheck 1 week.  Dx: CHF  3.  Daily HR & BP at least 1 hour after medications x 7 days.  Update NP in  1 week with results please.  Dx: hx NSTEMI, CHF, HTN    Electronically signed by  LORI Ricks CNP

## 2018-11-02 NOTE — LETTER
11/2/2018        RE: Jayne Vasquez  [address] [address] [place] [id number]        [place] [facility]    Chief Complaint   Patient presents with     group home Acute       [place] Medical Record Number:  [id number]  Place of Service where encounter took place:  [facility] ([facility] [[id number]]    HPI:    Jayne Vasquez is a 88 year old  (9/21/1930), who is being seen today for an episodic care visit.  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   Last few BMPs:   6/1/18: BUN 29, Creat 1.32, GFR 38  6/8/18: BUN 29, Creat 1.36, GFR 37  9/7/18: BUN 35, Creat 1.33, GFR 38  11/2/18: BUN 36, Creat 1.53, GFR 32    Patient has PMH of significant cardiac disease (see above) including NSTEMI, HFpEF Grade I, HTN, etc.  She is currently on amlodipine 7.5 mg daily, ASA 81 mg daily, lasix 20 mg daily, lisinopril 20 mg daily, nitroglycerin PRN.     Patient is met today in her SNF LTC room where she denies pain, CP, HA, lightheadedness. She is a poor historian, though, d/t severe cognitive impairment.     BPs 120-160s/70s  HRs 90s mostly    Weights:  10/9/18 - 196.2 lbs  10/23/18 - 197  11/1/18 - 199.8 lbs      ALLERGIES: Dye [contrast dye]; Fluoxetine; Iodine-131; Methocarbamol; Paroxetine; and Penicillins  Past Medical, Surgical, Family and Social History reviewed and updated in [facility].    Current Outpatient Prescriptions    Medication Sig Dispense Refill     Acetaminophen (TYLENOL PO) Take 650 mg by mouth 2 times daily Also TID PRN       AmLODIPine Besylate (NORVASC PO) Take 7.5 mg by mouth At Bedtime       ASPIRIN PO Take 81 mg by mouth daily       Blood Glucose Monitoring Suppl MISC 3 times daily (before meals)       BusPIRone HCl (BUSPAR PO) Take 7.5 mg by mouth 2 times daily        Carvedilol (COREG PO) Take 25 mg by mouth 2 times daily (with meals)       Cranberry-Vitamin C-Inulin (UTI-STAT PO) Take by mouth daily       fluticasone (FLONASE) 50 MCG/ACT nasal spray Spray 1 spray into both nostrils At Bedtime       Furosemide (LASIX PO) Take 10 mg by mouth daily        GABAPENTIN PO Take 600 mg by mouth 2 times daily        Glycerin-Dimeth-Surfactants (ONE-STEP SKIN CARE LOTION) LOTN Externally apply topically daily       hypromellose (ARTIFICIAL TEARS) 0.5 % SOLN 1 drop 3 times daily as needed for dry eyes       insulin aspart (NOVOLOG FLEXPEN) 100 UNIT/ML injection Inject 5 Units Subcutaneous 3 times daily (with meals)       insulin aspart (NOVOLOG FLEXPEN) 100 UNIT/ML injection Inject Subcutaneous 3 times daily (with meals) Sliding Scale;   If Blood Sugar is 200 to 249, give 2 Units.  If Blood Sugar is 250 to 299, give 4 Units.  If Blood Sugar is 300 to 349, give 6 Units.  If Blood Sugar is 350 to 400, give 8 Units.  If Blood Sugar is greater than 400, give 10 Units.  If Blood Sugar is greater than 401, call MD.       Insulin Glargine (BASAGLAR KWIKPEN SC) Inject 8 Units Subcutaneous 2 times daily        LevETIRAcetam (KEPPRA PO) Take 500 mg by mouth 2 times daily       LISINOPRIL PO Take 20 mg by mouth daily        nitroglycerin (NITROSTAT) 0.4 MG SL tablet Place 1 tablet (0.4 mg) under the tongue every 5 minutes as needed 25 tablet      nystatin (MYCOSTATIN) 738602 UNIT/GM POWD Apply topically 2 times daily as needed        polyethylene glycol (MIRALAX/GLYCOLAX) powder Take 17 g by mouth daily as needed for constipation     "    QUEtiapine Fumarate (SEROQUEL PO) Take 25 mg by mouth 2 times daily        QUEtiapine Fumarate (SEROQUEL PO) Take 12.5 mg by mouth daily midday       sennosides (SENOKOT) 8.6 MG tablet Take 2 tablets by mouth 2 times daily        traZODone (DESYREL) 50 MG tablet Take 1 tablet (50 mg) by mouth At Bedtime 31 tablet 12     trolamine salicylate (ASPERCREME) 10 % cream Apply topically 2 times daily as needed for moderate pain (also BID)        Venlafaxine HCl (EFFEXOR XR PO) Take 150 mg by mouth At Bedtime        Medications reviewed:  Medications reconciled to facility chart and changes were made to reflect current medications as identified as above med list. Below are the changes that were made:   Medications stopped since last EPIC medication reconciliation:   There are no discontinued medications.    Medications started since last [facility] medication reconciliation:  No orders of the defined types were placed in this encounter.    REVIEW OF SYSTEMS:  Unobtainable secondary to cognitive impairment.     Physical Exam:  /73  Pulse 98  Temp 96.7  F (35.9  C)  Resp 16  Ht 5' 2\" (1.575 m)  Wt 199 lb 12.8 oz (90.6 kg)  SpO2 91%  BMI 36.54 kg/m2  GENERAL APPEARANCE:  Asleep, awoke to voice and presence, confused, in no distress  RESP:  respiratory effort and palpation of chest normal, auscultation of lungs clear, no respiratory distress  CV:  Palpation and auscultation of heart done , rate and rhythm regular, no murmur, no LE peripheral edema  ABDOMEN: obese, normal bowel sounds, soft, nontender,   M/S:   Gait and station with W/C for mobility, Digits and nails with arthritic changes, reduced muscle mass  SKIN:  Inspection and Palpation of skin and subcutaneous tissue pale, seemingly intact  PSYCH:  insight and judgement, memory with severe impairment, affect and mood hypoactive today, does not follow commands readily         Recent Labs:   CBC RESULTS:   Recent Labs   Lab Test  09/07/18   0809  05/25/18   0745  " 03/02/18   0815   WBC  5.2   --   5.5   RBC  3.92   --   3.95   HGB  12.1  10.2*  11.8   HCT  36.2   --   37.2   MCV  92   --   94   MCH  30.9   --   29.9   MCHC  33.4   --   31.7   RDW  12.4   --   12.6   PLT  130*   --   142*       Last Basic Metabolic Panel:  Recent Labs   Lab Test  11/02/18   0830  09/07/18   0809  06/08/18   0740   NA  143   --   141   POTASSIUM  4.8   --   4.6   CHLORIDE  104   --   102   ELIZABETH  8.8   --   8.4*   CO2  30   --   33*   BUN  36*  35*  29   CR  1.53*  1.33*  1.36*   GLC  124*  168*  107*       Liver Function Studies -   Recent Labs   Lab Test  06/17/15   1809  06/17/15   1215   PROTTOTAL  7.6  7.4   ALBUMIN  3.6  3.5   BILITOTAL  0.2  0.2   ALKPHOS  91  89   AST  22  23   ALT  23  24       TSH   Date Value Ref Range Status   10/22/2013 2.04 0.4 - 5.0 mU/L Final   10/07/2011 2.58 0.4 - 5.0 mU/L Final   ]    Lab Results   Component Value Date    A1C 7.0 11/02/2018    A1C 5.9 05/25/2018         Assessment/Plan:     Diastolic dysfunction, left ventricle  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  Increase in weight likely nutritional as patient has obese abdomen (which may be worse since than baseline).    Patient is not on BB.  No reason to start at this time but if patient is becoming more hypertensive with reduced Lasix, could add BB as patient has History of NSTEMI.    If kidney function does not improve, could discontinue ACEI and monitor for need.   Will monitor closely.     Orders:  1.  Decrease Lasix to 10 mg po every day.  Dx: CHF  2.  BMP recheck 1  week.  Dx: CHF  3.  Daily HR & BP at least 1 hour after medications x 7 days.  Update NP in 1 week with results please.  Dx: hx NSTEMI, CHF, HTN    Electronically signed by  LORI Ricks CNP                      Sincerely,        LORI Ricks CNP

## 2018-11-09 NOTE — LETTER
"    11/9/2018        RE: Jayne Vasquez  [id number] [address] [place] [id number]          [facility]    Chief Complaint   Patient presents with     custodial Regulatory       [place] Medical Record Number:  [id number]  Place of Service where encounter took place:  [facility] [[id number]]    HPI:    Jayne Vasquez is a 88 year old  (9/21/1930), who is being seen today for a federally mandated E/M visit.  HPI information obtained from: facility chart records, facility staff, patient report and [facility] chart review. 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      Blood glucose monitoring:  AM: 125-227 (0 sliding scale insulin used)  Lunch: 205-299 mostly (2-4 units sliding scale insulin)  Dinner: 170-281 (0-6 sliding scale insulin)    Patient has significant neuropathy in her feet  She is on gabapentin 600 mg BID (recently reduced d/t kidney impairment).   Patient today grimaces and says \"Ouch!\" when palpating her ankles for edema.     Vascular dementia with behavioral disturbance  Delusional disorder (H)  Moderate major depression (H)  Paranoia (H)  Insomnia due to medical condition  Significant History of paranoia, hallucinations, etc   See previous notes for details    Patient is on buspirone 7.5 mg BID, Efexor  mg at bedtime, levetiracetam 500 mg BID, seroquel 25 mg in AM, PM, 12.5 mg in afternoon, trazodone 50 mg at bedtime for sleep.      Today patient is quite agitated at exam.  Nursing reports she believes her daughter is hurt and she needs to find help.  Usually this would make Jayne distraught but today she appears " "angry and is rather frustrated and upset at my attempts at an exam today.     BIMS - \"severe\", most recently 1/15.  PHQ9 not able to be assessed.     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  6/17/2015 ECHO with EF 60-65% with LVH     Patient is on amlodipine 7.5 mg at bedtime, ASA 81 mg daily, carvedilol 25 mg BID, Lasix 10 mg daily (recently reduced 11/2/18), lisinopril 20 mg daily, {:[id number]::\"nitroprusside\",\"nitroglycerin\"} PRN    BPs have been quite variable.  Since last change: 130-160s/70-80s but also has many 110s/50-60s and today is 88/56 even with recheck.     HRs 90s mostly  Patient unable to comment on symptoms of CP, HA, lightheadedness     Weights:   10/9/18: 196.2  10/23/18: 197  11/1/18: 199.8  (11/2/18 Lasix reduced from 20 mg daily --> 10 mg daily)  11/6/18: 199.3    CKD stage 3, due to effects of DM II and HTN GFR 9/2012 51; 4/2012 59; 12/11 53; 10/2011 59  Cardio-renal with CHF  Baseline Creat seemingly around 1.3  Last 3 BMPs:   6/8/18: BUN 29, Creat 1.36, GFR 37  9/7/18: BUN 35, Creat 1.33, GFR 38  11/2/18: BUN 36, Creat 1.53, GFR 32  11/9/18: BUN 31, Creat 1.57, GFR 31    Patient recently had Lasix reduced from 20 mg daily to 10 mg daily.     ALLERGIES: Dye [contrast dye]; Fluoxetine; Iodine-131; Methocarbamol; Paroxetine; and Penicillins  PAST MEDICAL HISTORY:  has a past medical history of Anemia, unspecified (11/04/10); Aphasia (02/12/10); Cataracts; Cerebral embolism with cerebral infarction (H) (02/16/10); Chest pain (11/04/10); Confusion (01/30/10); Congestive heart failure, unspecified; Coronary atherosclerosis of " unspecified type of vessel, native or graft (05/20/08); Diabetes mellitus (H); Diabetic eye exam (H) (3/26/15); Diabetic infection of left foot (2/26/2013); Diastolic dysfunction, left ventricle, grade I by Echo (4/2/2012); GERD (gastroesophageal reflux disease) (11/04/10); GI bleed; History of blood transfusion; History of recurrent UTIs; Hyperlipemia (4.22.11); Hypertension; Hyponatremia; Intermediate coronary syndrome (H); Labral tear of long head of biceps tendon; LVH (left ventricular hypertrophy) due to hypertensive disease - mild-moderate (4/2/2012); Open wound of left foot in 4th interdigital space (2/26/2013); Osteoarthrosis, shoulder region (07/19/09); Rotator cuff tear; Seizure disorder, secondary (H) (02/16/10); Subacromial bursitis; Transient cerebral ischemia (3/30/2012); Unspecified cerebral artery occlusion with cerebral infarction; and Vitamin D deficiency (02/08/10). She also has no past medical history of Asthma; Malignant neoplasm (H); or Thyroid disease.  PAST SURGICAL HISTORY:  has a past surgical history that includes appendectomy; Cholecystectomy; Hysterectomy; surgical history of - ; colonoscopy; cataract iol, rt/lt; surgical history of - ; surgical history of -  (5/2008); surgical history of - ; surgical history of - ; colonoscopy (5/31/11); surgical history of -  (5/31/11); cardiac catherization (05/20/08); Esophagoscopy, gastroscopy, duodenoscopy (EGD), combined (4/5/2012); Bypass graft artery coronary (4/9/2012); and Phacoemulsification with standard intraocular lens implant (Right, 4/16/2015).  FAMILY HISTORY: family history includes C.A.D. in her father, mother, and son; Cancer in her mother and sister; Cerebrovascular Disease in her sister; Neurologic Disorder in her son.  SOCIAL HISTORY:  reports that she has never smoked. She has never used smokeless tobacco. She reports that she does not drink alcohol or use illicit drugs.    MEDICATIONS:  Current Outpatient Prescriptions    Medication Sig Dispense Refill     Acetaminophen (TYLENOL PO) Take 650 mg by mouth 2 times daily Also TID PRN       AmLODIPine Besylate (NORVASC PO) Take 7.5 mg by mouth At Bedtime       ASPIRIN PO Take 81 mg by mouth daily       BusPIRone HCl (BUSPAR PO) Take 7.5 mg by mouth 2 times daily        Carvedilol (COREG PO) Take 25 mg by mouth 2 times daily (with meals)       Cranberry-Vitamin C-Inulin (UTI-STAT PO) Take by mouth daily       fluticasone (FLONASE) 50 MCG/ACT nasal spray Spray 1 spray into both nostrils At Bedtime       Furosemide (LASIX PO) Take 10 mg by mouth daily        GABAPENTIN PO Take 600 mg by mouth 2 times daily        Glycerin-Dimeth-Surfactants (ONE-STEP SKIN CARE LOTION) LOTN Externally apply topically daily       hypromellose (ARTIFICIAL TEARS) 0.5 % SOLN 1 drop 3 times daily as needed for dry eyes       insulin aspart (NOVOLOG FLEXPEN) 100 UNIT/ML injection Inject 5 Units Subcutaneous 3 times daily (with meals)       insulin aspart (NOVOLOG FLEXPEN) 100 UNIT/ML injection Inject Subcutaneous 3 times daily (with meals) Sliding Scale;   If Blood Sugar is 200 to 249, give 2 Units.  If Blood Sugar is 250 to 299, give 4 Units.  If Blood Sugar is 300 to 349, give 6 Units.  If Blood Sugar is 350 to 400, give 8 Units.  If Blood Sugar is greater than 400, give 10 Units.  If Blood Sugar is greater than 401, call MD.       Insulin Glargine (BASAGLAR KWIKPEN SC) Inject 8 Units Subcutaneous 2 times daily        LevETIRAcetam (KEPPRA PO) Take 500 mg by mouth 2 times daily       LISINOPRIL PO Take 20 mg by mouth daily        nitroglycerin (NITROSTAT) 0.4 MG SL tablet Place 1 tablet (0.4 mg) under the tongue every 5 minutes as needed 25 tablet      nystatin (MYCOSTATIN) 405273 UNIT/GM POWD Apply topically 2 times daily as needed        polyethylene glycol (MIRALAX/GLYCOLAX) powder Take 17 g by mouth daily as needed for constipation        QUEtiapine Fumarate (SEROQUEL PO) Take 25 mg by mouth 2 times daily    "     QUEtiapine Fumarate (SEROQUEL PO) Take 12.5 mg by mouth daily midday       sennosides (SENOKOT) 8.6 MG tablet Take 2 tablets by mouth 2 times daily        traZODone (DESYREL) 50 MG tablet Take 1 tablet (50 mg) by mouth At Bedtime 31 tablet 12     trolamine salicylate (ASPERCREME) 10 % cream Apply topically 2 times daily as needed for moderate pain (also BID)        Venlafaxine HCl (EFFEXOR XR PO) Take 150 mg by mouth At Bedtime        Blood Glucose Monitoring Suppl MISC 3 times daily (before meals)       Medications reviewed:  Medications reconciled to facility chart and changes were made to reflect current medications as identified as above med list. Below are the changes that were made:   Medications stopped since last EPIC medication reconciliation:   There are no discontinued medications.    Medications started since last [facility] medication reconciliation:  No orders of the defined types were placed in this encounter.    Case Management:  I have reviewed the care plan and MDS and do agree with the plan. Patient's desire to return to the community is not assessible due to cognitive impairment.  Information reviewed:  Medications, vital signs, orders, and nursing notes.    ROS:  Unobtainable secondary to cognitive impairment.     Exam:  Vitals: BP (!) 88/56  Pulse 91  Temp 95.5  F (35.3  C)  Resp 18  Ht 5' 2\" (1.575 m)  Wt 199 lb 4.8 oz (90.4 kg)  SpO2 94%  BMI 36.45 kg/m2  BMI= Body mass index is 36.45 kg/(m^2).  GENERAL APPEARANCE:  Alert, in no distress, upset stoday  RESP:  respiratory effort and palpation of chest normal, auscultation of lungs clear, no respiratory distress  CV:  Palpation and auscultation of heart done , rate and rhythm regular, no murmur, no LE peripheral edema  ABDOMEN:  normal bowel sounds, soft, nontender, no hepatosplenomegaly or other masses  M/S:   Gait and station with W/C for mobility, Digits and nails with arthritic changes, reduced muscle mass  SKIN:  Inspection and " Palpation of skin and subcutaneous tissue pale, PVD changes to LEs  PSYCH:  insight and judgement, memory with severe impairment, affect and mood upset today, does not follow commands readily         Lab/Diagnostic data:     CBC RESULTS:   Recent Labs   Lab Test  09/07/18   0809  05/25/18   0745  03/02/18   0815   WBC  5.2   --   5.5   RBC  3.92   --   3.95   HGB  12.1  10.2*  11.8   HCT  36.2   --   37.2   MCV  92   --   94   MCH  30.9   --   29.9   MCHC  33.4   --   31.7   RDW  12.4   --   12.6   PLT  130*   --   142*       Last Basic Metabolic Panel:  Recent Labs   Lab Test  11/09/18   0733  11/02/18   0830   NA  141  143   POTASSIUM  4.9  4.8   CHLORIDE  103  104   ELIZABETH  8.7  8.8   CO2  28  30   BUN  31*  36*   CR  1.57*  1.53*   GLC  173*  124*       Lab Results   Component Value Date    A1C 7.0 11/02/2018    A1C 5.9 05/25/2018       ASSESSMENT/PLAN  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   Lab Results   Component Value Date    A1C 7.0 11/02/2018    A1C 5.9 05/25/2018    A1C 7.7 12/27/2017    A1C 7.6 07/31/2017    A1C 7.5 04/26/2017     Goal: HgbA1C between 8-9%. Per [facility] there is potential harm in lowering the A1C to <6.5% in older adults with diabetes.     Will monitor for ability to liberalize sliding scale insulin.     Likely patient may need more pain coverage with reduction in Gabapentin (for renal dosing).  She seems to have a significant amount of pain in her LEs today, which also may be making her mood agitated and upset toady. Patient is already on myriad of anti-psychotics and antidepressants so adding duloxetine for neuropathic pain would be ill-advices and may lead to Serotonin syndrome.   Will monitor for need to change to lyrica for better tolerability/efficacy (however will have to renally dose this as well).      Vascular dementia with behavioral  disturbance  Delusional disorder (H)  Moderate major depression (H)  Paranoia (H)  Insomnia due to medical condition  No GDR of buspirone, effexor, levetiracetam, seroquel, or trazodone as patient has been fairly stable with periods in between that are difficult for her nad family has been upset with last few failed GDRs, despite conversations why they are needed.  Patient does have frequent periods of hallucinations, paranoia and current dosing makes these episodes fairly tolerable, although noted that GDR may make them less tolerable.  Will monitor for behaviors and ability to GDR.  Nursing for supportive cares as significant non-pharm interventions are attempted daily.     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  May need time to monitor before making changes.  Will monitor closely.     CKD stage 3, due to effects of DM II and HTN GFR 9/2012 51; 4/2012 59; 12/11 53; 10/2011 59  Will monitor kidney function as time progresses with reduction of Lasix.  Weights will also be followed for cardio-renal relationship.   Will avoid nephrotoxic agents and mindful prescribing with renal dosing.     Orders:  1. BMP on 11/21/18. Dx: CHF    Electronically signed by:  LORI Ricks CNP        Sincerely,        LORI Ricks CNP

## 2018-11-09 NOTE — LETTER
"    11/9/2018        RE: Jayne Vasquez  [address] [address] [place] [id number]          [facility]    Chief Complaint   Patient presents with     correction Regulatory       [facility] Record Number:  [id number]  Place of Service where encounter took place:  [facility] [[id number]]    HPI:    Jayne Vasquez is a 88 year old  (9/21/1930), who is being seen today for a federally mandated E/M visit.  HPI information obtained from: facility chart records, facility staff, patient report and [facility] chart review. 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      Blood glucose monitoring:  AM: 125-227 (0 sliding scale insulin used)  Lunch: 205-299 mostly (2-4 units sliding scale insulin)  Dinner: 170-281 (0-6 sliding scale insulin)    Patient has significant neuropathy in her feet  She is on gabapentin 600 mg BID (recently reduced d/t kidney impairment).   Patient today grimaces and says \"Ouch!\" when palpating her ankles for edema.     Vascular dementia with behavioral disturbance  Delusional disorder (H)  Moderate major depression (H)  Paranoia (H)  Insomnia due to medical condition  Significant History of paranoia, hallucinations, etc   See previous notes for details    Patient is on buspirone 7.5 mg BID, Efexor  mg at bedtime, levetiracetam 500 mg BID, seroquel 25 mg in AM, PM, 12.5 mg in afternoon, trazodone 50 mg at bedtime for sleep.      Today patient is quite agitated at exam.  Nursing reports she believes her daughter is hurt and she needs to find help.  Usually this would make Jayne distraught but today she appears " "angry and is rather frustrated and upset at my attempts at an exam today.     BIMS - \"severe\", most recently 1/15.  PHQ9 not able to be assessed.     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  6/17/2015 ECHO with EF 60-65% with LVH     Patient is on amlodipine 7.5 mg at bedtime, ASA 81 mg daily, carvedilol 25 mg BID, Lasix 10 mg daily (recently reduced 11/2/18), lisinopril 20 mg daily, nitroglycerin PRN    BPs have been quite variable.  Since last change: 130-160s/70-80s but also has many 110s/50-60s and today is 88/56 even with recheck.     HRs 90s mostly  Patient unable to comment on symptoms of CP, HA, lightheadedness     Weights:   10/9/18: 196.2  10/23/18: 197  11/1/18: 199.8  (11/2/18 Lasix reduced from 20 mg daily --> 10 mg daily)  11/6/18: 199.3    CKD stage 3, due to effects of DM II and HTN GFR 9/2012 51; 4/2012 59; 12/11 53; 10/2011 59  Cardio-renal with CHF  Baseline Creat seemingly around 1.3  Last 3 BMPs:   6/8/18: BUN 29, Creat 1.36, GFR 37  9/7/18: BUN 35, Creat 1.33, GFR 38  11/2/18: BUN 36, Creat 1.53, GFR 32  11/9/18: BUN 31, Creat 1.57, GFR 31    Patient recently had Lasix reduced from 20 mg daily to 10 mg daily.     ALLERGIES: Dye [contrast dye]; Fluoxetine; Iodine-131; Methocarbamol; Paroxetine; and Penicillins  PAST MEDICAL HISTORY:  has a past medical history of Anemia, unspecified (11/04/10); Aphasia (02/12/10); Cataracts; Cerebral embolism with cerebral infarction (H) (02/16/10); Chest pain (11/04/10); Confusion (01/30/10); Congestive heart failure, unspecified; Coronary atherosclerosis of unspecified type of vessel, native or " graft (05/20/08); Diabetes mellitus (H); Diabetic eye exam (H) (3/26/15); Diabetic infection of left foot (2/26/2013); Diastolic dysfunction, left ventricle, grade I by Echo (4/2/2012); GERD (gastroesophageal reflux disease) (11/04/10); GI bleed; History of blood transfusion; History of recurrent UTIs; Hyperlipemia (4.22.11); Hypertension; Hyponatremia; Intermediate coronary syndrome (H); Labral tear of long head of biceps tendon; LVH (left ventricular hypertrophy) due to hypertensive disease - mild-moderate (4/2/2012); Open wound of left foot in 4th interdigital space (2/26/2013); Osteoarthrosis, shoulder region (07/19/09); Rotator cuff tear; Seizure disorder, secondary (H) (02/16/10); Subacromial bursitis; Transient cerebral ischemia (3/30/2012); Unspecified cerebral artery occlusion with cerebral infarction; and Vitamin D deficiency (02/08/10). She also has no past medical history of Asthma; Malignant neoplasm (H); or Thyroid disease.  PAST SURGICAL HISTORY:  has a past surgical history that includes appendectomy; Cholecystectomy; Hysterectomy; surgical history of - ; colonoscopy; cataract iol, rt/lt; surgical history of - ; surgical history of -  (5/2008); surgical history of - ; surgical history of - ; colonoscopy (5/31/11); surgical history of -  (5/31/11); cardiac catherization (05/20/08); Esophagoscopy, gastroscopy, duodenoscopy (EGD), combined (4/5/2012); Bypass graft artery coronary (4/9/2012); and Phacoemulsification with standard intraocular lens implant (Right, 4/16/2015).  FAMILY HISTORY: family history includes C.A.D. in her father, mother, and son; Cancer in her mother and sister; Cerebrovascular Disease in her sister; Neurologic Disorder in her son.  SOCIAL HISTORY:  reports that she has never smoked. She has never used smokeless tobacco. She reports that she does not drink alcohol or use illicit drugs.    MEDICATIONS:  Current Outpatient Prescriptions   Medication Sig Dispense Refill      Acetaminophen (TYLENOL PO) Take 650 mg by mouth 2 times daily Also TID PRN       AmLODIPine Besylate (NORVASC PO) Take 7.5 mg by mouth At Bedtime       ASPIRIN PO Take 81 mg by mouth daily       BusPIRone HCl (BUSPAR PO) Take 7.5 mg by mouth 2 times daily        Carvedilol (COREG PO) Take 25 mg by mouth 2 times daily (with meals)       Cranberry-Vitamin C-Inulin (UTI-STAT PO) Take by mouth daily       fluticasone (FLONASE) 50 MCG/ACT nasal spray Spray 1 spray into both nostrils At Bedtime       Furosemide (LASIX PO) Take 10 mg by mouth daily        GABAPENTIN PO Take 600 mg by mouth 2 times daily        Glycerin-Dimeth-Surfactants (ONE-STEP SKIN CARE LOTION) LOTN Externally apply topically daily       hypromellose (ARTIFICIAL TEARS) 0.5 % SOLN 1 drop 3 times daily as needed for dry eyes       insulin aspart (NOVOLOG FLEXPEN) 100 UNIT/ML injection Inject 5 Units Subcutaneous 3 times daily (with meals)       insulin aspart (NOVOLOG FLEXPEN) 100 UNIT/ML injection Inject Subcutaneous 3 times daily (with meals) Sliding Scale;   If Blood Sugar is 200 to 249, give 2 Units.  If Blood Sugar is 250 to 299, give 4 Units.  If Blood Sugar is 300 to 349, give 6 Units.  If Blood Sugar is 350 to 400, give 8 Units.  If Blood Sugar is greater than 400, give 10 Units.  If Blood Sugar is greater than 401, call MD.       Insulin Glargine (BASAGLAR KWIKPEN SC) Inject 8 Units Subcutaneous 2 times daily        LevETIRAcetam (KEPPRA PO) Take 500 mg by mouth 2 times daily       LISINOPRIL PO Take 20 mg by mouth daily        nitroglycerin (NITROSTAT) 0.4 MG SL tablet Place 1 tablet (0.4 mg) under the tongue every 5 minutes as needed 25 tablet      nystatin (MYCOSTATIN) 956014 UNIT/GM POWD Apply topically 2 times daily as needed        polyethylene glycol (MIRALAX/GLYCOLAX) powder Take 17 g by mouth daily as needed for constipation        QUEtiapine Fumarate (SEROQUEL PO) Take 25 mg by mouth 2 times daily        QUEtiapine Fumarate (SEROQUEL  "PO) Take 12.5 mg by mouth daily midday       sennosides (SENOKOT) 8.6 MG tablet Take 2 tablets by mouth 2 times daily        traZODone (DESYREL) 50 MG tablet Take 1 tablet (50 mg) by mouth At Bedtime 31 tablet 12     trolamine salicylate (ASPERCREME) 10 % cream Apply topically 2 times daily as needed for moderate pain (also BID)        Venlafaxine HCl (EFFEXOR XR PO) Take 150 mg by mouth At Bedtime        Blood Glucose Monitoring Suppl MISC 3 times daily (before meals)       Medications reviewed:  Medications reconciled to facility chart and changes were made to reflect current medications as identified as above med list. Below are the changes that were made:   Medications stopped since last EPIC medication reconciliation:   There are no discontinued medications.    Medications started since last [facility] medication reconciliation:  No orders of the defined types were placed in this encounter.    Case Management:  I have reviewed the care plan and MDS and do agree with the plan. Patient's desire to return to the community is not assessible due to cognitive impairment.  Information reviewed:  Medications, vital signs, orders, and nursing notes.    ROS:  Unobtainable secondary to cognitive impairment.     Exam:  Vitals: BP (!) 88/56  Pulse 91  Temp 95.5  F (35.3  C)  Resp 18  Ht 5' 2\" (1.575 m)  Wt 199 lb 4.8 oz (90.4 kg)  SpO2 94%  BMI 36.45 kg/m2  BMI= Body mass index is 36.45 kg/(m^2).  GENERAL APPEARANCE:  Alert, in no distress, upset stoday  RESP:  respiratory effort and palpation of chest normal, auscultation of lungs clear, no respiratory distress  CV:  Palpation and auscultation of heart done , rate and rhythm regular, no murmur, no LE peripheral edema  ABDOMEN:  normal bowel sounds, soft, nontender, no hepatosplenomegaly or other masses  M/S:   Gait and station with W/C for mobility, Digits and nails with arthritic changes, reduced muscle mass  SKIN:  Inspection and Palpation of skin and subcutaneous " tissue pale, PVD changes to LEs  PSYCH:  insight and judgement, memory with severe impairment, affect and mood upset today, does not follow commands readily         Lab/Diagnostic data:     CBC RESULTS:   Recent Labs   Lab Test  09/07/18   0809  05/25/18   0745  03/02/18   0815   WBC  5.2   --   5.5   RBC  3.92   --   3.95   HGB  12.1  10.2*  11.8   HCT  36.2   --   37.2   MCV  92   --   94   MCH  30.9   --   29.9   MCHC  33.4   --   31.7   RDW  12.4   --   12.6   PLT  130*   --   142*       Last Basic Metabolic Panel:  Recent Labs   Lab Test  11/09/18   0733  11/02/18   0830   NA  141  143   POTASSIUM  4.9  4.8   CHLORIDE  103  104   ELIZABETH  8.7  8.8   CO2  28  30   BUN  31*  36*   CR  1.57*  1.53*   GLC  173*  124*       Lab Results   Component Value Date    A1C 7.0 11/02/2018    A1C 5.9 05/25/2018       ASSESSMENT/PLAN  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   Lab Results   Component Value Date    A1C 7.0 11/02/2018    A1C 5.9 05/25/2018    A1C 7.7 12/27/2017    A1C 7.6 07/31/2017    A1C 7.5 04/26/2017     Goal: HgbA1C between 8-9%. Per [employer]GS there is potential harm in lowering the A1C to <6.5% in older adults with diabetes.     Will monitor for ability to liberalize sliding scale insulin.     Likely patient may need more pain coverage with reduction in Gabapentin (for renal dosing).  She seems to have a significant amount of pain in her LEs today, which also may be making her mood agitated and upset toady. Patient is already on myriad of anti-psychotics and antidepressants so adding duloxetine for neuropathic pain would be ill-advices and may lead to Serotonin syndrome.   Will monitor for need to change to lyrica for better tolerability/efficacy (however will have to renally dose this as well).      Vascular dementia with behavioral disturbance  Delusional disorder (H)  Moderate  major depression (H)  Paranoia (H)  Insomnia due to medical condition  No GDR of buspirone, effexor, levetiracetam, seroquel, or trazodone as patient has been fairly stable with periods in between that are difficult for her nad family has been upset with last few failed GDRs, despite conversations why they are needed.  Patient does have frequent periods of hallucinations, paranoia and current dosing makes these episodes fairly tolerable, although noted that GDR may make them less tolerable.  Will monitor for behaviors and ability to GDR.  Nursing for supportive cares as significant non-pharm interventions are attempted daily.     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  May need time to monitor before making changes.  Will monitor closely.     CKD stage 3, due to effects of DM II and HTN GFR 9/2012 51; 4/2012 59; 12/11 53; 10/2011 59  Will monitor kidney function as time progresses with reduction of Lasix.  Weights will also be followed for cardio-renal relationship.   Will avoid nephrotoxic agents and mindful prescribing with renal dosing.     Orders:  1. BMP on 11/21/18. Dx: CHF    Electronically signed by:  LORI Ricks CNP        Sincerely,        LORI Ricks CNP

## 2018-11-09 NOTE — PROGRESS NOTES
"  [facility]    Chief Complaint   Patient presents with     group home Regulatory       [place] Medical Record Number:  [id number]  Place of Service where encounter took place:  [facility] ([facility] [[id number]]    HPI:    Jayne Vasquez is a 88 year old  (9/21/1930), who is being seen today for a federally mandated E/M visit.  HPI information obtained from: facility chart records, facility staff, patient report and [facility] chart review. 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      Blood glucose monitoring:  AM: 125-227 (0 sliding scale insulin used)  Lunch: 205-299 mostly (2-4 units sliding scale insulin)  Dinner: 170-281 (0-6 sliding scale insulin)    Patient has significant neuropathy in her feet  She is on gabapentin 600 mg BID (recently reduced d/t kidney impairment).   Patient today grimaces and says \"Ouch!\" when palpating her ankles for edema.     Vascular dementia with behavioral disturbance  Delusional disorder (H)  Moderate major depression (H)  Paranoia (H)  Insomnia due to medical condition  Significant History of paranoia, hallucinations, etc   See previous notes for details    Patient is on buspirone 7.5 mg BID, Efexor  mg at bedtime, levetiracetam 500 mg BID, seroquel 25 mg in AM, PM, 12.5 mg in afternoon, trazodone 50 mg at bedtime for sleep.      Today patient is quite agitated at exam.  Nursing reports she believes her daughter is hurt and she needs to find help.  Usually this would make Jayne distraught but today she appears angry and is rather frustrated and upset at my attempts at an exam today.     BIMS - " "\"severe\", most recently 1/15.  PHQ9 not able to be assessed.     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  6/17/2015 ECHO with EF 60-65% with LVH     Patient is on amlodipine 7.5 mg at bedtime, ASA 81 mg daily, carvedilol 25 mg BID, Lasix 10 mg daily (recently reduced 11/2/18), lisinopril 20 mg daily, nitroglycerin PRN    BPs have been quite variable.  Since last change: 130-160s/70-80s but also has many 110s/50-60s and today is 88/56 even with recheck.     HRs 90s mostly  Patient unable to comment on symptoms of CP, HA, lightheadedness     Weights:   10/9/18: 196.2  10/23/18: 197  11/1/18: 199.8  (11/2/18 Lasix reduced from 20 mg daily --> 10 mg daily)  11/6/18: 199.3    CKD stage 3, due to effects of DM II and HTN GFR 9/2012 51; 4/2012 59; 12/11 53; 10/2011 59  Cardio-renal with CHF  Baseline Creat seemingly around 1.3  Last 3 BMPs:   6/8/18: BUN 29, Creat 1.36, GFR 37  9/7/18: BUN 35, Creat 1.33, GFR 38  11/2/18: BUN 36, Creat 1.53, GFR 32  11/9/18: BUN 31, Creat 1.57, GFR 31    Patient recently had Lasix reduced from 20 mg daily to 10 mg daily.     ALLERGIES: Dye [contrast dye]; Fluoxetine; Iodine-131; Methocarbamol; Paroxetine; and Penicillins  PAST MEDICAL HISTORY:  has a past medical history of Anemia, unspecified (11/04/10); Aphasia (02/12/10); Cataracts; Cerebral embolism with cerebral infarction (H) (02/16/10); Chest pain (11/04/10); Confusion (01/30/10); Congestive heart failure, unspecified; Coronary atherosclerosis of unspecified type of vessel, native or graft (05/20/08); Diabetes mellitus (H); Diabetic eye exam (H) (3/26/15); Diabetic " infection of left foot (2/26/2013); Diastolic dysfunction, left ventricle, grade I by Echo (4/2/2012); GERD (gastroesophageal reflux disease) (11/04/10); GI bleed; History of blood transfusion; History of recurrent UTIs; Hyperlipemia (4.22.11); Hypertension; Hyponatremia; Intermediate coronary syndrome (H); Labral tear of long head of biceps tendon; LVH (left ventricular hypertrophy) due to hypertensive disease - mild-moderate (4/2/2012); Open wound of left foot in 4th interdigital space (2/26/2013); Osteoarthrosis, shoulder region (07/19/09); Rotator cuff tear; Seizure disorder, secondary (H) (02/16/10); Subacromial bursitis; Transient cerebral ischemia (3/30/2012); Unspecified cerebral artery occlusion with cerebral infarction; and Vitamin D deficiency (02/08/10). She also has no past medical history of Asthma; Malignant neoplasm (H); or Thyroid disease.  PAST SURGICAL HISTORY:  has a past surgical history that includes appendectomy; Cholecystectomy; Hysterectomy; surgical history of - ; colonoscopy; cataract iol, rt/lt; surgical history of - ; surgical history of -  (5/2008); surgical history of - ; surgical history of - ; colonoscopy (5/31/11); surgical history of -  (5/31/11); cardiac catherization (05/20/08); Esophagoscopy, gastroscopy, duodenoscopy (EGD), combined (4/5/2012); Bypass graft artery coronary (4/9/2012); and Phacoemulsification with standard intraocular lens implant (Right, 4/16/2015).  FAMILY HISTORY: family history includes C.A.D. in her father, mother, and son; Cancer in her mother and sister; Cerebrovascular Disease in her sister; Neurologic Disorder in her son.  SOCIAL HISTORY:  reports that she has never smoked. She has never used smokeless tobacco. She reports that she does not drink alcohol or use illicit drugs.    MEDICATIONS:  Current Outpatient Prescriptions   Medication Sig Dispense Refill     Acetaminophen (TYLENOL PO) Take 650 mg by mouth 2 times daily Also TID PRN       AmLODIPine  Besylate (NORVASC PO) Take 7.5 mg by mouth At Bedtime       ASPIRIN PO Take 81 mg by mouth daily       BusPIRone HCl (BUSPAR PO) Take 7.5 mg by mouth 2 times daily        Carvedilol (COREG PO) Take 25 mg by mouth 2 times daily (with meals)       Cranberry-Vitamin C-Inulin (UTI-STAT PO) Take by mouth daily       fluticasone (FLONASE) 50 MCG/ACT nasal spray Spray 1 spray into both nostrils At Bedtime       Furosemide (LASIX PO) Take 10 mg by mouth daily        GABAPENTIN PO Take 600 mg by mouth 2 times daily        Glycerin-Dimeth-Surfactants (ONE-STEP SKIN CARE LOTION) LOTN Externally apply topically daily       hypromellose (ARTIFICIAL TEARS) 0.5 % SOLN 1 drop 3 times daily as needed for dry eyes       insulin aspart (NOVOLOG FLEXPEN) 100 UNIT/ML injection Inject 5 Units Subcutaneous 3 times daily (with meals)       insulin aspart (NOVOLOG FLEXPEN) 100 UNIT/ML injection Inject Subcutaneous 3 times daily (with meals) Sliding Scale;   If Blood Sugar is 200 to 249, give 2 Units.  If Blood Sugar is 250 to 299, give 4 Units.  If Blood Sugar is 300 to 349, give 6 Units.  If Blood Sugar is 350 to 400, give 8 Units.  If Blood Sugar is greater than 400, give 10 Units.  If Blood Sugar is greater than 401, call MD.       Insulin Glargine (BASAGLAR KWIKPEN SC) Inject 8 Units Subcutaneous 2 times daily        LevETIRAcetam (KEPPRA PO) Take 500 mg by mouth 2 times daily       LISINOPRIL PO Take 20 mg by mouth daily        nitroglycerin (NITROSTAT) 0.4 MG SL tablet Place 1 tablet (0.4 mg) under the tongue every 5 minutes as needed 25 tablet      nystatin (MYCOSTATIN) 642262 UNIT/GM POWD Apply topically 2 times daily as needed        polyethylene glycol (MIRALAX/GLYCOLAX) powder Take 17 g by mouth daily as needed for constipation        QUEtiapine Fumarate (SEROQUEL PO) Take 25 mg by mouth 2 times daily        QUEtiapine Fumarate (SEROQUEL PO) Take 12.5 mg by mouth daily midday       sennosides (SENOKOT) 8.6 MG tablet Take 2  "tablets by mouth 2 times daily        traZODone (DESYREL) 50 MG tablet Take 1 tablet (50 mg) by mouth At Bedtime 31 tablet 12     trolamine salicylate (ASPERCREME) 10 % cream Apply topically 2 times daily as needed for moderate pain (also BID)        Venlafaxine HCl (EFFEXOR XR PO) Take 150 mg by mouth At Bedtime        Blood Glucose Monitoring Suppl MISC 3 times daily (before meals)       Medications reviewed:  Medications reconciled to facility chart and changes were made to reflect current medications as identified as above med list. Below are the changes that were made:   Medications stopped since last EPIC medication reconciliation:   There are no discontinued medications.    Medications started since last [facility] medication reconciliation:  No orders of the defined types were placed in this encounter.    Case Management:  I have reviewed the care plan and MDS and do agree with the plan. Patient's desire to return to the community is not assessible due to cognitive impairment.  Information reviewed:  Medications, vital signs, orders, and nursing notes.    ROS:  Unobtainable secondary to cognitive impairment.     Exam:  Vitals: BP (!) 88/56  Pulse 91  Temp 95.5  F (35.3  C)  Resp 18  Ht 5' 2\" (1.575 m)  Wt 199 lb 4.8 oz (90.4 kg)  SpO2 94%  BMI 36.45 kg/m2  BMI= Body mass index is 36.45 kg/(m^2).  GENERAL APPEARANCE:  Alert, in no distress, upset stoday  RESP:  respiratory effort and palpation of chest normal, auscultation of lungs clear, no respiratory distress  CV:  Palpation and auscultation of heart done , rate and rhythm regular, no murmur, no LE peripheral edema  ABDOMEN:  normal bowel sounds, soft, nontender, no hepatosplenomegaly or other masses  M/S:   Gait and station with W/C for mobility, Digits and nails with arthritic changes, reduced muscle mass  SKIN:  Inspection and Palpation of skin and subcutaneous tissue pale, PVD changes to LEs  PSYCH:  insight and judgement, memory with severe " impairment, affect and mood upset today, does not follow commands readily         Lab/Diagnostic data:     CBC RESULTS:   Recent Labs   Lab Test  09/07/18   0809  05/25/18   0745  03/02/18   0815   WBC  5.2   --   5.5   RBC  3.92   --   3.95   HGB  12.1  10.2*  11.8   HCT  36.2   --   37.2   MCV  92   --   94   MCH  30.9   --   29.9   MCHC  33.4   --   31.7   RDW  12.4   --   12.6   PLT  130*   --   142*       Last Basic Metabolic Panel:  Recent Labs   Lab Test  11/09/18   0733  11/02/18   0830   NA  141  143   POTASSIUM  4.9  4.8   CHLORIDE  103  104   ELIZABETH  8.7  8.8   CO2  28  30   BUN  31*  36*   CR  1.57*  1.53*   GLC  173*  124*       Lab Results   Component Value Date    A1C 7.0 11/02/2018    A1C 5.9 05/25/2018       ASSESSMENT/PLAN  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   Lab Results   Component Value Date    A1C 7.0 11/02/2018    A1C 5.9 05/25/2018    A1C 7.7 12/27/2017    A1C 7.6 07/31/2017    A1C 7.5 04/26/2017     Goal: HgbA1C between 8-9%. Per [facility]GS there is potential harm in lowering the A1C to <6.5% in older adults with diabetes.     Will monitor for ability to liberalize sliding scale insulin.     Likely patient may need more pain coverage with reduction in Gabapentin (for renal dosing).  She seems to have a significant amount of pain in her LEs today, which also may be making her mood agitated and upset toady. Patient is already on myriad of anti-psychotics and antidepressants so adding duloxetine for neuropathic pain would be ill-advices and may lead to Serotonin syndrome.   Will monitor for need to change to lyrica for better tolerability/efficacy (however will have to renally dose this as well).      Vascular dementia with behavioral disturbance  Delusional disorder (H)  Moderate major depression (H)  Paranoia (H)  Insomnia due to medical condition  No GDR of  buspirone, effexor, levetiracetam, seroquel, or trazodone as patient has been fairly stable with periods in between that are difficult for her nad family has been upset with last few failed GDRs, despite conversations why they are needed.  Patient does have frequent periods of hallucinations, paranoia and current dosing makes these episodes fairly tolerable, although noted that GDR may make them less tolerable.  Will monitor for behaviors and ability to GDR.  Nursing for supportive cares as significant non-pharm interventions are attempted daily.     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  May need time to monitor before making changes.  Will monitor closely.     CKD stage 3, due to effects of DM II and HTN GFR 9/2012 51; 4/2012 59; 12/11 53; 10/2011 59  Will monitor kidney function as time progresses with reduction of Lasix.  Weights will also be followed for cardio-renal relationship.   Will avoid nephrotoxic agents and mindful prescribing with renal dosing.     Orders:  1. BMP on 11/21/18. Dx: CHF    Electronically signed by:  LORI Ricks CNP

## 2018-11-20 NOTE — PROGRESS NOTES
"[facility]    Chief Complaint   Patient presents with     detention Acute       [place] Medical Record Number:  [id number]  Place of Service where encounter took place:  [facility] ([facility] [[id number]]    HPI:    Jayne Vasquez is a 88 year old  (9/21/1930), who is being seen today for an episodic care visit.  HPI information obtained from: facility chart records, facility staff, patient report and [facility] chart review. Today's concern is:     Vascular dementia with behavioral disturbance  Delusional disorder (H)  Moderate major depression (H)  Paranoia (H)  Insomnia due to medical condition     Patient with significant history of dementia with behaviors including paranoia, hallucinations, delusions and is frequently upset or agitated.  She has failed GDR multiple times and family has voiced frustration and being upset at attempted GDR with consequences of their mom being upset and agitated.      Patient is currently on buspirone 7.5 mg BID, Effexor  mg daily, seroquel 25 mg BID and 12.5 mg daily in the afternoon, trazodone 50 mg qHS  (Gabapentin 600 mg BID for neuropathy)    REVIEW OF SYSTEMS:  Unobtainable secondary to cognitive impairment.     /75  Pulse 90  Temp 95  F (35  C)  Resp 18  Ht 5' 2\" (1.575 m)  Wt 199 lb 6.4 oz (90.4 kg)  SpO2 90%  BMI 36.47 kg/m2  GENERAL APPEARANCE:  Alert, in no distress  RESP:  respiratory effort normal, no respiratory distress  CV:  No LE peripheral edema  ABDOMEN:  Nondistended, obese  M/S:   Gait and station with W/C for mobility, Digits and nails with arthritic changes, reducd muscle mass  SKIN:  Inspection and Palpation of skin and subcutaneous tissue pale, intact  PSYCH:  insight and judgement, memory with significant impairment, affect and mood per baseline, does not follow commands readily           ASSESSMENT/PLAN:     Vascular dementia with behavioral disturbance  Delusional disorder (H)  Moderate major " depression (H)  Paranoia (H)  Insomnia due to medical condition     No GDR at this time as family was very upset at last failed GDR, noting they do not want to see any GDR again.  Can ascertain from conversation with them if they would be willing to attempt again but will refrain at this time as patient has had calm days but also has had days where she is agitated and having delusions.  Will monitor for ability to GDR in future      Electronically signed by:  LORI Ricks CNP

## 2018-11-20 NOTE — LETTER
"    11/20/2018        RE: Jayne Vasquez  [id number] [address] [place] [id number]        [facility]    Chief Complaint   Patient presents with     detention Acute       [place] Medical Record Number:  [id number]  Place of Service where encounter took place:  [facility] ([facility]) [[id number]]    HPI:    Jayne Vasquez is a 88 year old  (9/21/1930), who is being seen today for an episodic care visit.  HPI information obtained from: facility chart records, facility staff, patient report and [facility] chart review. Today's concern is:     Vascular dementia with behavioral disturbance  Delusional disorder (H)  Moderate major depression (H)  Paranoia (H)  Insomnia due to medical condition     Patient with significant history of dementia with behaviors including paranoia, hallucinations, delusions and is frequently upset or agitated.  She has failed GDR multiple times and family has voiced frustration and being upset at attempted GDR with consequences of their mom being upset and agitated.      Patient is currently on buspirone 7.5 mg BID, Effexor  mg daily, seroquel 25 mg BID and 12.5 mg daily in the afternoon, trazodone 50 mg qHS  (Gabapentin 600 mg BID for neuropathy)    REVIEW OF SYSTEMS:  Unobtainable secondary to cognitive impairment.     /75  Pulse 90  Temp 95  F (35  C)  Resp 18  Ht 5' 2\" (1.575 m)  Wt 199 lb 6.4 oz (90.4 kg)  SpO2 90%  BMI 36.47 kg/m2  GENERAL APPEARANCE:  Alert, in no distress  RESP:  respiratory effort normal, no respiratory distress  CV:  No LE peripheral edema  ABDOMEN:  Nondistended, obese  M/S:   Gait and station with W/C for mobility, Digits and nails with arthritic changes, reducd muscle mass  SKIN:  Inspection and Palpation of skin and subcutaneous tissue pale, intact  PSYCH:  insight and judgement, memory with significant impairment, affect and mood per baseline, does not follow commands readily           ASSESSMENT/PLAN:   "   Vascular dementia with behavioral disturbance  Delusional disorder (H)  Moderate major depression (H)  Paranoia (H)  Insomnia due to medical condition     No GDR at this time as family was very upset at last failed GDR, noting they do not want to see any GDR again.  Can ascertain from conversation with them if they would be willing to attempt again but will refrain at this time as patient has had calm days but also has had days where she is agitated and having delusions.  Will monitor for ability to GDR in future      Electronically signed by:  LORI Ricks CNP      Sincerely,        LORI Ricks CNP

## 2018-12-21 NOTE — PROGRESS NOTES
[facility]    Chief Complaint   Patient presents with     half-way Acute       [facility] Medical Record Number:  [id number]  Place of Service where encounter took place:  [facility] ([facility] [[id number]]    HPI:    Jayne Vasquez is a 88 year old  (9/21/1930), who is being seen today for an episodic care visit.  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    About a month ago patient had Lasix reduced from 20 mg daily to 10 mg daily.     Patient is on carvedilol 25 mg BID, ASA 81 mg daily, lisinopril 20 mg daily, amlodipine 7.5 mg nightly, Lasix 10 mg daily, nitroglycerin as needed  BP is variable: 100s/50s at times, 120-150s/ 50s-60s at other times  HRs 80-90s  Patient unable to comment on symptoms of CP, HA, lightheadedness     Weights being done weekly:   12/21 - 202 lbs  12/18 - 202 lbs  12/11 - 201  12/4 - 199.7  11/27 - 179.1  11/20 - 119.4  11/6 - 199.3  10/23 - 197  9/18 - 192.3     Last few BMPs:   6/8/18: BUN 29, Creat 1.36, GFR 37  11/2/18: BUN 36, Creat 1.53, GFR 32  11/9/18: BUN 31, Creat 1.57, GFR 31  11/21/18: BUN 36, Creat 1.51, GFR 33          ALLERGIES: Dye [contrast dye]; Fluoxetine; Iodine-131; Methocarbamol; Paroxetine; and Penicillins  Past Medical, Surgical, Family and Social History reviewed and updated in [facility].    Current Outpatient Medications   Medication Sig Dispense Refill     Acetaminophen (TYLENOL PO) Take 650 mg by  mouth 2 times daily Also TID PRN       AmLODIPine Besylate (NORVASC PO) Take 7.5 mg by mouth At Bedtime       ASPIRIN PO Take 81 mg by mouth daily       Blood Glucose Monitoring Suppl MISC 3 times daily (before meals)       BusPIRone HCl (BUSPAR PO) Take 7.5 mg by mouth 2 times daily        Carvedilol (COREG PO) Take 25 mg by mouth 2 times daily (with meals)       Cranberry-Vitamin C-Inulin (UTI-STAT PO) Take by mouth daily       fluticasone (FLONASE) 50 MCG/ACT nasal spray Spray 1 spray into both nostrils At Bedtime       Furosemide (LASIX PO) Take 10 mg by mouth daily        GABAPENTIN PO Take 600 mg by mouth 2 times daily        Glycerin-Dimeth-Surfactants (ONE-STEP SKIN CARE LOTION) LOTN Externally apply topically daily       hypromellose (ARTIFICIAL TEARS) 0.5 % SOLN 1 drop 3 times daily as needed for dry eyes       insulin aspart (NOVOLOG FLEXPEN) 100 UNIT/ML injection Inject 5 Units Subcutaneous 3 times daily (with meals)       insulin aspart (NOVOLOG FLEXPEN) 100 UNIT/ML injection Inject Subcutaneous 3 times daily (with meals) Sliding Scale;   If Blood Sugar is 200 to 249, give 2 Units.  If Blood Sugar is 250 to 299, give 4 Units.  If Blood Sugar is 300 to 349, give 6 Units.  If Blood Sugar is 350 to 400, give 8 Units.  If Blood Sugar is greater than 400, give 10 Units.  If Blood Sugar is greater than 401, call MD.       Insulin Glargine (BASAGLAR KWIKPEN SC) Inject 8 Units Subcutaneous 2 times daily        LevETIRAcetam (KEPPRA PO) Take 500 mg by mouth 2 times daily       LISINOPRIL PO Take 20 mg by mouth daily        nitroglycerin (NITROSTAT) 0.4 MG SL tablet Place 1 tablet (0.4 mg) under the tongue every 5 minutes as needed 25 tablet      nystatin (MYCOSTATIN) 950196 UNIT/GM POWD Apply topically 2 times daily as needed        polyethylene glycol (MIRALAX/GLYCOLAX) powder Take 17 g by mouth daily as needed for constipation        QUEtiapine Fumarate (SEROQUEL PO) Take 25 mg by mouth 2 times daily         "QUEtiapine Fumarate (SEROQUEL PO) Take 12.5 mg by mouth daily midday       QUEtiapine Fumarate (SEROQUEL PO) Take 25 mg by mouth 2 times daily        sennosides (SENOKOT) 8.6 MG tablet Take 2 tablets by mouth 2 times daily        traZODone (DESYREL) 50 MG tablet Take 1 tablet (50 mg) by mouth At Bedtime 31 tablet 12     trolamine salicylate (ASPERCREME) 10 % cream Apply topically 2 times daily as needed for moderate pain (also BID)        Venlafaxine HCl (EFFEXOR XR PO) Take 150 mg by mouth At Bedtime        Medications reviewed:  Medications reconciled to facility chart and changes were made to reflect current medications as identified as above med list. Below are the changes that were made:   Medications stopped since last EPIC medication reconciliation:   There are no discontinued medications.    Medications started since last [facility] medication reconciliation:  No orders of the defined types were placed in this encounter.    REVIEW OF SYSTEMS:  Unobtainable secondary to cognitive impairment.     Physical Exam:  /51   Pulse 93   Temp 98.5  F (36.9  C)   Resp 16   Ht 1.575 m (5' 2\")   Wt 91.6 kg (202 lb)   SpO2 93%   BMI 36.95 kg/m    GENERAL APPEARANCE:  Alert, in no distress, pleasant, confused  RESP:  respiratory effort and palpation of chest normal, auscultation of lungs clear , no respiratory distress  CV:  Palpation and auscultation of heart done , rate and rhythm regular, no murmur, no LE peripheral edema  ABDOMEN:  Obese, normal bowel sounds, soft, nontender, MAJOR for hepatosplenomegaly or other masses d/t body habitus  M/S:   Gait and station with W/C fo rmobility, Digits and nails with arthritic changes, reduced muscle mass  SKIN:  Inspection and Palpation of skin and subcutaneous tissue pale, intact  PSYCH:  insight and judgement, memory with impairment , affect and mood normal, does not follow commands readily         Recent Labs:     CBC RESULTS:   Recent Labs   Lab Test 09/07/18  0809 " 05/25/18  0745 03/02/18  0815   WBC 5.2  --  5.5   RBC 3.92  --  3.95   HGB 12.1 10.2* 11.8   HCT 36.2  --  37.2   MCV 92  --  94   MCH 30.9  --  29.9   MCHC 33.4  --  31.7   RDW 12.4  --  12.6   *  --  142*       Last Basic Metabolic Panel:  Recent Labs   Lab Test 11/21/18  0800 11/09/18  0733    141   POTASSIUM 4.7 4.9   CHLORIDE 104 103   ELIZABETH 8.1* 8.7   CO2 28 28   BUN 36* 31*   CR 1.51* 1.57*   * 173*       Liver Function Studies -   Recent Labs   Lab Test 06/17/15  1809 06/17/15  1215   PROTTOTAL 7.6 7.4   ALBUMIN 3.6 3.5   BILITOTAL 0.2 0.2   ALKPHOS 91 89   AST 22 23   ALT 23 24       TSH   Date Value Ref Range Status   10/22/2013 2.04 0.4 - 5.0 mU/L Final   10/07/2011 2.58 0.4 - 5.0 mU/L Final   ]    Lab Results   Component Value Date    A1C 7.0 11/02/2018    A1C 5.9 05/25/2018         Assessment/Plan:     Diastolic dysfunction, left ventricle  Coronary artery disease involving native coronary artery of native heart without angina pectoris  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    Nursing staff weighed patient and double checked weight of W/C as patient has new cushion on W/C - still 202 lbs.   Can obtain labs for monitoring and monitor for titration needs.      Orders:  1. BMP on 12/24/18. Dx: CHF     Electronically signed by  LORI Ricks CNP

## 2018-12-21 NOTE — LETTER
12/21/2018        RE: Jayne Vasquez  [address] [address] [place] [id number]        [facility]    Chief Complaint   Patient presents with     MCFP Acute       [facility] Record Number:  [id number]  Place of Service where encounter took place:  [facility] ([facility] [[id number]]    HPI:    Jayne Vasquez is a 88 year old  (9/21/1930), who is being seen today for an episodic care visit.  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    About a month ago patient had Lasix reduced from 20 mg daily to 10 mg daily.     Patient is on carvedilol 25 mg BID, ASA 81 mg daily, lisinopril 20 mg daily, amlodipine 7.5 mg nightly, Lasix 10 mg daily, nitroglycerin as needed  BP is variable: 100s/50s at times, 120-150s/ 50s-60s at other times  HRs 80-90s  Patient unable to comment on symptoms of CP, HA, lightheadedness     Weights being done weekly:   12/21 - 202 lbs  12/18 - 202 lbs  12/11 - 201  12/4 - 199.7  11/27 - 179.1  11/20 - 119.4  11/6 - 199.3  10/23 - 197  9/18 - 192.3     Last few BMPs:   6/8/18: BUN 29, Creat 1.36, GFR 37  11/2/18: BUN 36, Creat 1.53, GFR 32  11/9/18: BUN 31, Creat 1.57, GFR 31  11/21/18: BUN 36, Creat 1.51, GFR 33          ALLERGIES: Dye [contrast dye]; Fluoxetine; Iodine-131; Methocarbamol; Paroxetine; and Penicillins  Past Medical, Surgical, Family and Social History reviewed and updated in [facility].    Current Outpatient  Medications   Medication Sig Dispense Refill     Acetaminophen (TYLENOL PO) Take 650 mg by mouth 2 times daily Also TID PRN       AmLODIPine Besylate (NORVASC PO) Take 7.5 mg by mouth At Bedtime       ASPIRIN PO Take 81 mg by mouth daily       Blood Glucose Monitoring Suppl MISC 3 times daily (before meals)       BusPIRone HCl (BUSPAR PO) Take 7.5 mg by mouth 2 times daily        Carvedilol (COREG PO) Take 25 mg by mouth 2 times daily (with meals)       Cranberry-Vitamin C-Inulin (UTI-STAT PO) Take by mouth daily       fluticasone (FLONASE) 50 MCG/ACT nasal spray Spray 1 spray into both nostrils At Bedtime       Furosemide (LASIX PO) Take 10 mg by mouth daily        GABAPENTIN PO Take 600 mg by mouth 2 times daily        Glycerin-Dimeth-Surfactants (ONE-STEP SKIN CARE LOTION) LOTN Externally apply topically daily       hypromellose (ARTIFICIAL TEARS) 0.5 % SOLN 1 drop 3 times daily as needed for dry eyes       insulin aspart (NOVOLOG FLEXPEN) 100 UNIT/ML injection Inject 5 Units Subcutaneous 3 times daily (with meals)       insulin aspart (NOVOLOG FLEXPEN) 100 UNIT/ML injection Inject Subcutaneous 3 times daily (with meals) Sliding Scale;   If Blood Sugar is 200 to 249, give 2 Units.  If Blood Sugar is 250 to 299, give 4 Units.  If Blood Sugar is 300 to 349, give 6 Units.  If Blood Sugar is 350 to 400, give 8 Units.  If Blood Sugar is greater than 400, give 10 Units.  If Blood Sugar is greater than 401, call MD.       Insulin Glargine (BASAGLAR KWIKPEN SC) Inject 8 Units Subcutaneous 2 times daily        LevETIRAcetam (KEPPRA PO) Take 500 mg by mouth 2 times daily       LISINOPRIL PO Take 20 mg by mouth daily        nitroglycerin (NITROSTAT) 0.4 MG SL tablet Place 1 tablet (0.4 mg) under the tongue every 5 minutes as needed 25 tablet      nystatin (MYCOSTATIN) 766438 UNIT/GM POWD Apply topically 2 times daily as needed        polyethylene glycol (MIRALAX/GLYCOLAX) powder Take 17 g by mouth daily as needed for  "constipation        QUEtiapine Fumarate (SEROQUEL PO) Take 25 mg by mouth 2 times daily        QUEtiapine Fumarate (SEROQUEL PO) Take 12.5 mg by mouth daily midday       QUEtiapine Fumarate (SEROQUEL PO) Take 25 mg by mouth 2 times daily        sennosides (SENOKOT) 8.6 MG tablet Take 2 tablets by mouth 2 times daily        traZODone (DESYREL) 50 MG tablet Take 1 tablet (50 mg) by mouth At Bedtime 31 tablet 12     trolamine salicylate (ASPERCREME) 10 % cream Apply topically 2 times daily as needed for moderate pain (also BID)        Venlafaxine HCl (EFFEXOR XR PO) Take 150 mg by mouth At Bedtime        Medications reviewed:  Medications reconciled to facility chart and changes were made to reflect current medications as identified as above med list. Below are the changes that were made:   Medications stopped since last EPIC medication reconciliation:   There are no discontinued medications.    Medications started since last [facility] medication reconciliation:  No orders of the defined types were placed in this encounter.    REVIEW OF SYSTEMS:  Unobtainable secondary to cognitive impairment.     Physical Exam:  /51   Pulse 93   Temp 98.5  F (36.9  C)   Resp 16   Ht 1.575 m (5' 2\")   Wt 91.6 kg (202 lb)   SpO2 93%   BMI 36.95 kg/m     GENERAL APPEARANCE:  Alert, in no distress, pleasant, confused  RESP:  respiratory effort and palpation of chest normal, auscultation of lungs clear , no respiratory distress  CV:  Palpation and auscultation of heart done , rate and rhythm regular, no murmur, no LE peripheral edema  ABDOMEN:  Obese, normal bowel sounds, soft, nontender, MAJOR for hepatosplenomegaly or other masses d/t body habitus  M/S:   Gait and station with W/C fo rmobility, Digits and nails with arthritic changes, reduced muscle mass  SKIN:  Inspection and Palpation of skin and subcutaneous tissue pale, intact  PSYCH:  insight and judgement, memory with impairment , affect and mood normal, does not follow " commands readily         Recent Labs:     CBC RESULTS:   Recent Labs   Lab Test 09/07/18  0809 05/25/18  0745 03/02/18  0815   WBC 5.2  --  5.5   RBC 3.92  --  3.95   HGB 12.1 10.2* 11.8   HCT 36.2  --  37.2   MCV 92  --  94   MCH 30.9  --  29.9   MCHC 33.4  --  31.7   RDW 12.4  --  12.6   *  --  142*       Last Basic Metabolic Panel:  Recent Labs   Lab Test 11/21/18  0800 11/09/18  0733    141   POTASSIUM 4.7 4.9   CHLORIDE 104 103   ELIZABETH 8.1* 8.7   CO2 28 28   BUN 36* 31*   CR 1.51* 1.57*   * 173*       Liver Function Studies -   Recent Labs   Lab Test 06/17/15  1809 06/17/15  1215   PROTTOTAL 7.6 7.4   ALBUMIN 3.6 3.5   BILITOTAL 0.2 0.2   ALKPHOS 91 89   AST 22 23   ALT 23 24       TSH   Date Value Ref Range Status   10/22/2013 2.04 0.4 - 5.0 mU/L Final   10/07/2011 2.58 0.4 - 5.0 mU/L Final   ]    Lab Results   Component Value Date    A1C 7.0 11/02/2018    A1C 5.9 05/25/2018         Assessment/Plan:     Diastolic dysfunction, left ventricle  Coronary artery disease involving native coronary artery of native heart without angina pectoris  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    Nursing staff weighed patient and double checked weight of W/C as patient has new cushion on W/C - still 202 lbs.   Can obtain labs for monitoring and monitor for titration needs.      Orders:  1. BMP on 12/24/18. Dx: CHF     Electronically signed by  LORI Ricks CNP                      Sincerely,        LORI Ricks CNP

## 2019-01-01 ENCOUNTER — HOSPITAL LABORATORY (OUTPATIENT)
Dept: NURSING HOME | Facility: OTHER | Age: 84
End: 2019-01-01

## 2019-01-01 ENCOUNTER — NURSING HOME VISIT (OUTPATIENT)
Dept: GERIATRICS | Facility: CLINIC | Age: 84
End: 2019-01-01
Payer: COMMERCIAL

## 2019-01-01 ENCOUNTER — MEDICAL CORRESPONDENCE (OUTPATIENT)
Dept: HEALTH INFORMATION MANAGEMENT | Facility: CLINIC | Age: 84
End: 2019-01-01

## 2019-01-01 ENCOUNTER — APPOINTMENT (OUTPATIENT)
Dept: GENERAL RADIOLOGY | Facility: CLINIC | Age: 84
DRG: 189 | End: 2019-01-01
Attending: FAMILY MEDICINE
Payer: COMMERCIAL

## 2019-01-01 ENCOUNTER — HOSPITAL ENCOUNTER (INPATIENT)
Facility: CLINIC | Age: 84
LOS: 1 days | DRG: 189 | End: 2019-04-03
Attending: FAMILY MEDICINE | Admitting: HOSPITALIST
Payer: COMMERCIAL

## 2019-01-01 ENCOUNTER — APPOINTMENT (OUTPATIENT)
Dept: CARDIOLOGY | Facility: CLINIC | Age: 84
DRG: 189 | End: 2019-01-01
Attending: HOSPITALIST
Payer: COMMERCIAL

## 2019-01-01 ENCOUNTER — TRANSFERRED RECORDS (OUTPATIENT)
Dept: HEALTH INFORMATION MANAGEMENT | Facility: CLINIC | Age: 84
End: 2019-01-01

## 2019-01-01 VITALS
BODY MASS INDEX: 34.66 KG/M2 | WEIGHT: 203 LBS | HEIGHT: 64 IN | TEMPERATURE: 96.6 F | DIASTOLIC BLOOD PRESSURE: 56 MMHG | HEART RATE: 92 BPM | RESPIRATION RATE: 14 BRPM | OXYGEN SATURATION: 93 % | SYSTOLIC BLOOD PRESSURE: 116 MMHG

## 2019-01-01 VITALS
DIASTOLIC BLOOD PRESSURE: 62 MMHG | HEIGHT: 62 IN | WEIGHT: 202 LBS | OXYGEN SATURATION: 96 % | HEART RATE: 89 BPM | TEMPERATURE: 98 F | RESPIRATION RATE: 16 BRPM | SYSTOLIC BLOOD PRESSURE: 98 MMHG | BODY MASS INDEX: 37.17 KG/M2

## 2019-01-01 VITALS
DIASTOLIC BLOOD PRESSURE: 77 MMHG | OXYGEN SATURATION: 91 % | HEIGHT: 62 IN | SYSTOLIC BLOOD PRESSURE: 180 MMHG | BODY MASS INDEX: 37.17 KG/M2 | WEIGHT: 202 LBS | HEART RATE: 79 BPM | TEMPERATURE: 98.6 F | RESPIRATION RATE: 20 BRPM

## 2019-01-01 VITALS
OXYGEN SATURATION: 88 % | HEIGHT: 62 IN | DIASTOLIC BLOOD PRESSURE: 60 MMHG | TEMPERATURE: 98 F | WEIGHT: 204 LBS | SYSTOLIC BLOOD PRESSURE: 142 MMHG | RESPIRATION RATE: 16 BRPM | HEART RATE: 80 BPM | BODY MASS INDEX: 37.54 KG/M2

## 2019-01-01 VITALS
SYSTOLIC BLOOD PRESSURE: 121 MMHG | DIASTOLIC BLOOD PRESSURE: 61 MMHG | RESPIRATION RATE: 14 BRPM | OXYGEN SATURATION: 94 % | HEART RATE: 79 BPM | TEMPERATURE: 97.3 F | BODY MASS INDEX: 35.57 KG/M2 | WEIGHT: 207.23 LBS

## 2019-01-01 DIAGNOSIS — F41.9 ANXIETY: ICD-10-CM

## 2019-01-01 DIAGNOSIS — F22 PARANOIA (H): ICD-10-CM

## 2019-01-01 DIAGNOSIS — F01.518 VASCULAR DEMENTIA WITH BEHAVIORAL DISTURBANCE (H): ICD-10-CM

## 2019-01-01 DIAGNOSIS — I51.9 DIASTOLIC DYSFUNCTION, LEFT VENTRICLE: ICD-10-CM

## 2019-01-01 DIAGNOSIS — F22 DELUSIONAL DISORDER (H): ICD-10-CM

## 2019-01-01 DIAGNOSIS — N18.30 ANEMIA DUE TO STAGE 3 CHRONIC KIDNEY DISEASE (H): ICD-10-CM

## 2019-01-01 DIAGNOSIS — E11.49 DIABETES MELLITUS TYPE 2 WITH NEUROLOGICAL MANIFESTATIONS (H): Primary | ICD-10-CM

## 2019-01-01 DIAGNOSIS — D61.818 PANCYTOPENIA (H): ICD-10-CM

## 2019-01-01 DIAGNOSIS — I48.20 CHRONIC ATRIAL FIBRILLATION (H): ICD-10-CM

## 2019-01-01 DIAGNOSIS — I51.9 DIASTOLIC DYSFUNCTION, LEFT VENTRICLE: Primary | ICD-10-CM

## 2019-01-01 DIAGNOSIS — F06.0 PSYCHOTIC DISORDER WITH HALLUCINATIONS DUE TO KNOWN PHYSIOLOGICAL CONDITION: ICD-10-CM

## 2019-01-01 DIAGNOSIS — D63.1 ANEMIA DUE TO STAGE 3 CHRONIC KIDNEY DISEASE (H): ICD-10-CM

## 2019-01-01 DIAGNOSIS — I13.0 HYPERTENSIVE HEART AND KIDNEY DISEASE WITH HF AND WITH CKD STAGE I-IV (H): ICD-10-CM

## 2019-01-01 DIAGNOSIS — I50.9 ACUTE ON CHRONIC CONGESTIVE HEART FAILURE, UNSPECIFIED HEART FAILURE TYPE (H): ICD-10-CM

## 2019-01-01 DIAGNOSIS — I34.0 MITRAL VALVE INSUFFICIENCY, UNSPECIFIED ETIOLOGY: ICD-10-CM

## 2019-01-01 DIAGNOSIS — I35.0 AORTIC STENOSIS, MILD: ICD-10-CM

## 2019-01-01 DIAGNOSIS — K59.01 SLOW TRANSIT CONSTIPATION: ICD-10-CM

## 2019-01-01 DIAGNOSIS — E78.5 HYPERLIPIDEMIA LDL GOAL <100: ICD-10-CM

## 2019-01-01 DIAGNOSIS — F41.9 ANXIETY: Primary | ICD-10-CM

## 2019-01-01 DIAGNOSIS — I25.10 CORONARY ARTERY DISEASE INVOLVING NATIVE CORONARY ARTERY OF NATIVE HEART WITHOUT ANGINA PECTORIS: ICD-10-CM

## 2019-01-01 DIAGNOSIS — I25.2 OLD MYOCARDIAL INFARCTION: ICD-10-CM

## 2019-01-01 DIAGNOSIS — N18.4 STAGE 4 CHRONIC KIDNEY DISEASE (H): ICD-10-CM

## 2019-01-01 DIAGNOSIS — F32.1 MODERATE MAJOR DEPRESSION (H): ICD-10-CM

## 2019-01-01 DIAGNOSIS — E66.01 MORBID OBESITY (H): ICD-10-CM

## 2019-01-01 DIAGNOSIS — I61.2 NONTRAUMATIC HEMORRHAGE OF CEREBRAL HEMISPHERE, UNSPECIFIED LATERALITY (H): ICD-10-CM

## 2019-01-01 DIAGNOSIS — E11.8 TYPE 2 DIABETES MELLITUS WITH COMPLICATION, WITH LONG-TERM CURRENT USE OF INSULIN (H): ICD-10-CM

## 2019-01-01 DIAGNOSIS — I70.219 ATHEROSCLEROTIC PERIPHERAL VASCULAR DISEASE WITH INTERMITTENT CLAUDICATION (H): ICD-10-CM

## 2019-01-01 DIAGNOSIS — G47.01 INSOMNIA DUE TO MEDICAL CONDITION: ICD-10-CM

## 2019-01-01 DIAGNOSIS — N18.30 CKD (CHRONIC KIDNEY DISEASE) STAGE 3, GFR 30-59 ML/MIN (H): ICD-10-CM

## 2019-01-01 DIAGNOSIS — J96.01 ACUTE RESPIRATORY FAILURE WITH HYPOXIA (H): Primary | ICD-10-CM

## 2019-01-01 DIAGNOSIS — I48.20 CHRONIC ATRIAL FIBRILLATION (H): Primary | ICD-10-CM

## 2019-01-01 DIAGNOSIS — I27.20 PULMONARY HTN (H): ICD-10-CM

## 2019-01-01 DIAGNOSIS — R54 FRAIL ELDERLY: ICD-10-CM

## 2019-01-01 DIAGNOSIS — K21.9 GASTROESOPHAGEAL REFLUX DISEASE, ESOPHAGITIS PRESENCE NOT SPECIFIED: ICD-10-CM

## 2019-01-01 DIAGNOSIS — R56.9 CONVULSIONS, UNSPECIFIED CONVULSION TYPE (H): ICD-10-CM

## 2019-01-01 DIAGNOSIS — Z79.4 TYPE 2 DIABETES MELLITUS WITH COMPLICATION, WITH LONG-TERM CURRENT USE OF INSULIN (H): ICD-10-CM

## 2019-01-01 DIAGNOSIS — J18.9 PNEUMONIA OF RIGHT LUNG DUE TO INFECTIOUS ORGANISM, UNSPECIFIED PART OF LUNG: ICD-10-CM

## 2019-01-01 DIAGNOSIS — R07.9 CHEST PAIN, UNSPECIFIED TYPE: ICD-10-CM

## 2019-01-01 DIAGNOSIS — J44.9 CHRONIC OBSTRUCTIVE PULMONARY DISEASE, UNSPECIFIED COPD TYPE (H): ICD-10-CM

## 2019-01-01 DIAGNOSIS — J16.8 PNEUMONIA DUE TO OTHER SPECIFIED INFECTIOUS ORGANISMS: ICD-10-CM

## 2019-01-01 DIAGNOSIS — R52 PAIN: ICD-10-CM

## 2019-01-01 LAB
ALBUMIN SERPL-MCNC: 3.4 G/DL (ref 3.4–5)
ALBUMIN UR-MCNC: 30 MG/DL
ALP SERPL-CCNC: 96 U/L (ref 40–150)
ALT SERPL W P-5'-P-CCNC: 41 U/L (ref 0–50)
ANION GAP SERPL CALCULATED.3IONS-SCNC: 10 MMOL/L (ref 3–14)
ANION GAP SERPL CALCULATED.3IONS-SCNC: 10 MMOL/L (ref 3–14)
ANION GAP SERPL CALCULATED.3IONS-SCNC: 3 MMOL/L (ref 3–14)
ANION GAP SERPL CALCULATED.3IONS-SCNC: 6 MMOL/L (ref 3–14)
ANION GAP SERPL CALCULATED.3IONS-SCNC: 8 MMOL/L (ref 3–14)
ANION GAP SERPL CALCULATED.3IONS-SCNC: 8 MMOL/L (ref 3–14)
APPEARANCE UR: CLEAR
AST SERPL W P-5'-P-CCNC: 39 U/L (ref 0–45)
BACTERIA #/AREA URNS HPF: ABNORMAL /HPF
BACTERIA SPEC CULT: NO GROWTH
BACTERIA SPEC CULT: NORMAL
BASE EXCESS BLDV CALC-SCNC: 2.1 MMOL/L
BASE EXCESS BLDV CALC-SCNC: 2.5 MMOL/L
BASOPHILS # BLD AUTO: 0 10E9/L (ref 0–0.2)
BASOPHILS NFR BLD AUTO: 0.2 %
BASOPHILS NFR BLD AUTO: 0.2 %
BASOPHILS NFR BLD AUTO: 0.4 %
BILIRUB SERPL-MCNC: 0.4 MG/DL (ref 0.2–1.3)
BILIRUB UR QL STRIP: NEGATIVE
BUN SERPL-MCNC: 31 MG/DL (ref 7–30)
BUN SERPL-MCNC: 36 MG/DL (ref 7–30)
BUN SERPL-MCNC: 41 MG/DL (ref 7–30)
BUN SERPL-MCNC: 44 MG/DL (ref 7–30)
BUN SERPL-MCNC: 59 MG/DL (ref 7–30)
BUN SERPL-MCNC: 60 MG/DL (ref 7–30)
BUN SERPL-MCNC: 60 MG/DL (ref 7–30)
CALCIUM SERPL-MCNC: 8.3 MG/DL (ref 8.5–10.1)
CALCIUM SERPL-MCNC: 8.4 MG/DL (ref 8.5–10.1)
CALCIUM SERPL-MCNC: 8.4 MG/DL (ref 8.5–10.1)
CALCIUM SERPL-MCNC: 8.5 MG/DL (ref 8.5–10.1)
CALCIUM SERPL-MCNC: 8.5 MG/DL (ref 8.5–10.1)
CALCIUM SERPL-MCNC: 8.6 MG/DL (ref 8.5–10.1)
CHLORIDE SERPL-SCNC: 101 MMOL/L (ref 94–109)
CHLORIDE SERPL-SCNC: 103 MMOL/L (ref 94–109)
CHLORIDE SERPL-SCNC: 105 MMOL/L (ref 94–109)
CHLORIDE SERPL-SCNC: 105 MMOL/L (ref 94–109)
CHLORIDE SERPL-SCNC: 106 MMOL/L (ref 94–109)
CHLORIDE SERPL-SCNC: 106 MMOL/L (ref 94–109)
CO2 SERPL-SCNC: 26 MMOL/L (ref 20–32)
CO2 SERPL-SCNC: 26 MMOL/L (ref 20–32)
CO2 SERPL-SCNC: 27 MMOL/L (ref 20–32)
CO2 SERPL-SCNC: 28 MMOL/L (ref 20–32)
CO2 SERPL-SCNC: 29 MMOL/L (ref 20–32)
CO2 SERPL-SCNC: 34 MMOL/L (ref 20–32)
COLOR UR AUTO: YELLOW
CREAT SERPL-MCNC: 1.56 MG/DL (ref 0.52–1.04)
CREAT SERPL-MCNC: 1.57 MG/DL (ref 0.52–1.04)
CREAT SERPL-MCNC: 1.7 MG/DL (ref 0.52–1.04)
CREAT SERPL-MCNC: 1.93 MG/DL (ref 0.52–1.04)
CREAT SERPL-MCNC: 2.25 MG/DL (ref 0.52–1.04)
CREAT SERPL-MCNC: 2.29 MG/DL (ref 0.52–1.04)
CREAT SERPL-MCNC: 2.3 MG/DL (ref 0.52–1.04)
DIFFERENTIAL METHOD BLD: ABNORMAL
EOSINOPHIL NFR BLD AUTO: 0.4 %
EOSINOPHIL NFR BLD AUTO: 1 %
EOSINOPHIL NFR BLD AUTO: 1.2 %
ERYTHROCYTE [DISTWIDTH] IN BLOOD BY AUTOMATED COUNT: 12.4 % (ref 10–15)
ERYTHROCYTE [DISTWIDTH] IN BLOOD BY AUTOMATED COUNT: 12.4 % (ref 10–15)
ERYTHROCYTE [DISTWIDTH] IN BLOOD BY AUTOMATED COUNT: 12.6 % (ref 10–15)
ERYTHROCYTE [DISTWIDTH] IN BLOOD BY AUTOMATED COUNT: 13.2 % (ref 10–15)
ERYTHROCYTE [DISTWIDTH] IN BLOOD BY AUTOMATED COUNT: 13.5 % (ref 10–15)
FLUAV+FLUBV AG SPEC QL: NEGATIVE
FOLATE SERPL-MCNC: 17 NG/ML
GFR SERPL CREATININE-BSD FRML MDRD: 18 ML/MIN/{1.73_M2}
GFR SERPL CREATININE-BSD FRML MDRD: 18 ML/MIN/{1.73_M2}
GFR SERPL CREATININE-BSD FRML MDRD: 19 ML/MIN/{1.73_M2}
GFR SERPL CREATININE-BSD FRML MDRD: 23 ML/MIN/{1.73_M2}
GFR SERPL CREATININE-BSD FRML MDRD: 26 ML/MIN/{1.73_M2}
GFR SERPL CREATININE-BSD FRML MDRD: 29 ML/MIN/{1.73_M2}
GFR SERPL CREATININE-BSD FRML MDRD: 29 ML/MIN/{1.73_M2}
GLUCOSE BLDC GLUCOMTR-MCNC: 181 MG/DL (ref 70–99)
GLUCOSE BLDC GLUCOMTR-MCNC: 187 MG/DL (ref 70–99)
GLUCOSE BLDC GLUCOMTR-MCNC: 199 MG/DL (ref 70–99)
GLUCOSE BLDC GLUCOMTR-MCNC: 212 MG/DL (ref 70–99)
GLUCOSE BLDC GLUCOMTR-MCNC: 217 MG/DL (ref 70–99)
GLUCOSE BLDC GLUCOMTR-MCNC: 229 MG/DL (ref 70–99)
GLUCOSE BLDC GLUCOMTR-MCNC: 240 MG/DL (ref 70–99)
GLUCOSE BLDC GLUCOMTR-MCNC: 269 MG/DL (ref 70–99)
GLUCOSE SERPL-MCNC: 143 MG/DL (ref 70–99)
GLUCOSE SERPL-MCNC: 178 MG/DL (ref 70–99)
GLUCOSE SERPL-MCNC: 214 MG/DL (ref 70–99)
GLUCOSE SERPL-MCNC: 218 MG/DL (ref 70–99)
GLUCOSE SERPL-MCNC: 239 MG/DL (ref 70–99)
GLUCOSE SERPL-MCNC: 305 MG/DL (ref 70–99)
GLUCOSE UR STRIP-MCNC: NEGATIVE MG/DL
HBA1C MFR BLD: 6.4 % (ref 0–5.6)
HBA1C MFR BLD: 6.6 % (ref 0–5.6)
HCO3 BLDV-SCNC: 28 MMOL/L (ref 21–28)
HCO3 BLDV-SCNC: 30 MMOL/L (ref 21–28)
HCT VFR BLD AUTO: 24.7 % (ref 35–47)
HCT VFR BLD AUTO: 27.5 % (ref 35–47)
HCT VFR BLD AUTO: 28 % (ref 35–47)
HCT VFR BLD AUTO: 29.8 % (ref 35–47)
HCT VFR BLD AUTO: 31.9 % (ref 35–47)
HGB BLD-MCNC: 7.5 G/DL (ref 11.7–15.7)
HGB BLD-MCNC: 7.8 G/DL (ref 11.7–15.7)
HGB BLD-MCNC: 8.5 G/DL (ref 11.7–15.7)
HGB BLD-MCNC: 8.8 G/DL (ref 11.7–15.7)
HGB BLD-MCNC: 9.4 G/DL (ref 11.7–15.7)
HGB BLD-MCNC: 9.9 G/DL (ref 11.7–15.7)
HGB UR QL STRIP: NEGATIVE
HYALINE CASTS #/AREA URNS LPF: 4 /LPF (ref 0–2)
IMM GRANULOCYTES # BLD: 0.1 10E9/L (ref 0–0.4)
IMM GRANULOCYTES # BLD: 0.1 10E9/L (ref 0–0.4)
IMM GRANULOCYTES # BLD: 0.2 10E9/L (ref 0–0.4)
IMM GRANULOCYTES NFR BLD: 1.7 %
IMM GRANULOCYTES NFR BLD: 1.8 %
IMM GRANULOCYTES NFR BLD: 4.2 %
KETONES UR STRIP-MCNC: NEGATIVE MG/DL
LACTATE BLD-SCNC: 0.8 MMOL/L (ref 0.7–2)
LACTATE BLD-SCNC: 0.9 MMOL/L (ref 0.7–2)
LACTATE BLD-SCNC: 1 MMOL/L (ref 0.7–2)
LACTATE BLD-SCNC: 1.4 MMOL/L (ref 0.7–2)
LEUKOCYTE ESTERASE UR QL STRIP: ABNORMAL
LYMPHOCYTES # BLD AUTO: 1.2 10E9/L (ref 0.8–5.3)
LYMPHOCYTES # BLD AUTO: 1.2 10E9/L (ref 0.8–5.3)
LYMPHOCYTES # BLD AUTO: 1.4 10E9/L (ref 0.8–5.3)
LYMPHOCYTES NFR BLD AUTO: 21.4 %
LYMPHOCYTES NFR BLD AUTO: 25.6 %
LYMPHOCYTES NFR BLD AUTO: 35.1 %
Lab: NORMAL
MCH RBC QN AUTO: 30.9 PG (ref 26.5–33)
MCH RBC QN AUTO: 31 PG (ref 26.5–33)
MCH RBC QN AUTO: 31.4 PG (ref 26.5–33)
MCH RBC QN AUTO: 31.7 PG (ref 26.5–33)
MCH RBC QN AUTO: 31.8 PG (ref 26.5–33)
MCHC RBC AUTO-ENTMCNC: 30.4 G/DL (ref 31.5–36.5)
MCHC RBC AUTO-ENTMCNC: 30.9 G/DL (ref 31.5–36.5)
MCHC RBC AUTO-ENTMCNC: 31 G/DL (ref 31.5–36.5)
MCHC RBC AUTO-ENTMCNC: 31.4 G/DL (ref 31.5–36.5)
MCHC RBC AUTO-ENTMCNC: 31.5 G/DL (ref 31.5–36.5)
MCV RBC AUTO: 100 FL (ref 78–100)
MCV RBC AUTO: 100 FL (ref 78–100)
MCV RBC AUTO: 101 FL (ref 78–100)
MCV RBC AUTO: 102 FL (ref 78–100)
MCV RBC AUTO: 102 FL (ref 78–100)
MONOCYTES # BLD AUTO: 0.3 10E9/L (ref 0–1.3)
MONOCYTES # BLD AUTO: 0.4 10E9/L (ref 0–1.3)
MONOCYTES # BLD AUTO: 0.5 10E9/L (ref 0–1.3)
MONOCYTES NFR BLD AUTO: 7.1 %
MONOCYTES NFR BLD AUTO: 8.9 %
MONOCYTES NFR BLD AUTO: 9.3 %
MUCOUS THREADS #/AREA URNS LPF: PRESENT /LPF
NEUTROPHILS # BLD AUTO: 2.1 10E9/L (ref 1.6–8.3)
NEUTROPHILS # BLD AUTO: 2.9 10E9/L (ref 1.6–8.3)
NEUTROPHILS # BLD AUTO: 3.7 10E9/L (ref 1.6–8.3)
NEUTROPHILS NFR BLD AUTO: 52.5 %
NEUTROPHILS NFR BLD AUTO: 61.7 %
NEUTROPHILS NFR BLD AUTO: 67.3 %
NITRATE UR QL: NEGATIVE
NRBC # BLD AUTO: 0 10*3/UL
NRBC BLD AUTO-RTO: 0 /100
NT-PROBNP SERPL-MCNC: ABNORMAL PG/ML (ref 0–1800)
O2/TOTAL GAS SETTING VFR VENT: 55 %
O2/TOTAL GAS SETTING VFR VENT: 75 %
PCO2 BLDV: 51 MM HG (ref 40–50)
PCO2 BLDV: 58 MM HG (ref 40–50)
PH BLDV: 7.32 PH (ref 7.32–7.43)
PH BLDV: 7.35 PH (ref 7.32–7.43)
PH UR STRIP: 5 PH (ref 5–7)
PLATELET # BLD AUTO: 103 10E9/L (ref 150–450)
PLATELET # BLD AUTO: 107 10E9/L (ref 150–450)
PLATELET # BLD AUTO: 126 10E9/L (ref 150–450)
PLATELET # BLD AUTO: 91 10E9/L (ref 150–450)
PLATELET # BLD AUTO: 96 10E9/L (ref 150–450)
PO2 BLDV: 30 MM HG (ref 25–47)
PO2 BLDV: 45 MM HG (ref 25–47)
POTASSIUM SERPL-SCNC: 4.3 MMOL/L (ref 3.4–5.3)
POTASSIUM SERPL-SCNC: 4.3 MMOL/L (ref 3.4–5.3)
POTASSIUM SERPL-SCNC: 4.7 MMOL/L (ref 3.4–5.3)
POTASSIUM SERPL-SCNC: 4.7 MMOL/L (ref 3.4–5.3)
POTASSIUM SERPL-SCNC: 4.9 MMOL/L (ref 3.4–5.3)
POTASSIUM SERPL-SCNC: 5.3 MMOL/L (ref 3.4–5.3)
PROCALCITONIN SERPL-MCNC: 0.4 NG/ML
PROT SERPL-MCNC: 8.2 G/DL (ref 6.8–8.8)
RBC # BLD AUTO: 2.43 10E12/L (ref 3.8–5.2)
RBC # BLD AUTO: 2.74 10E12/L (ref 3.8–5.2)
RBC # BLD AUTO: 2.8 10E12/L (ref 3.8–5.2)
RBC # BLD AUTO: 2.96 10E12/L (ref 3.8–5.2)
RBC # BLD AUTO: 3.12 10E12/L (ref 3.8–5.2)
RBC #/AREA URNS AUTO: 5 /HPF (ref 0–2)
SODIUM SERPL-SCNC: 137 MMOL/L (ref 133–144)
SODIUM SERPL-SCNC: 139 MMOL/L (ref 133–144)
SODIUM SERPL-SCNC: 140 MMOL/L (ref 133–144)
SODIUM SERPL-SCNC: 141 MMOL/L (ref 133–144)
SODIUM SERPL-SCNC: 141 MMOL/L (ref 133–144)
SODIUM SERPL-SCNC: 143 MMOL/L (ref 133–144)
SOURCE: ABNORMAL
SP GR UR STRIP: 1.01 (ref 1–1.03)
SPECIMEN SOURCE: NORMAL
SQUAMOUS #/AREA URNS AUTO: 1 /HPF (ref 0–1)
TRANS CELLS #/AREA URNS HPF: <1 /HPF
TROPONIN I SERPL-MCNC: 0.1 UG/L (ref 0–0.04)
TROPONIN I SERPL-MCNC: 0.1 UG/L (ref 0–0.04)
TROPONIN I SERPL-MCNC: 0.11 UG/L (ref 0–0.04)
TROPONIN I SERPL-MCNC: 0.13 UG/L (ref 0–0.04)
UROBILINOGEN UR STRIP-MCNC: 0 MG/DL (ref 0–2)
VIT B12 SERPL-MCNC: 645 PG/ML (ref 193–986)
WBC # BLD AUTO: 3.6 10E9/L (ref 4–11)
WBC # BLD AUTO: 4.1 10E9/L (ref 4–11)
WBC # BLD AUTO: 4.8 10E9/L (ref 4–11)
WBC # BLD AUTO: 5.4 10E9/L (ref 4–11)
WBC # BLD AUTO: 5.4 10E9/L (ref 4–11)
WBC #/AREA URNS AUTO: 25 /HPF (ref 0–5)

## 2019-01-01 PROCEDURE — 82803 BLOOD GASES ANY COMBINATION: CPT | Performed by: FAMILY MEDICINE

## 2019-01-01 PROCEDURE — 25800030 ZZH RX IP 258 OP 636: Performed by: HOSPITALIST

## 2019-01-01 PROCEDURE — 71045 X-RAY EXAM CHEST 1 VIEW: CPT | Mod: TC

## 2019-01-01 PROCEDURE — 99309 SBSQ NF CARE MODERATE MDM 30: CPT | Performed by: NURSE PRACTITIONER

## 2019-01-01 PROCEDURE — 84145 PROCALCITONIN (PCT): CPT | Performed by: FAMILY MEDICINE

## 2019-01-01 PROCEDURE — 99318 ZZC ANNUAL NURSING FAC ASSESSMNT, STABLE: CPT | Performed by: NURSE PRACTITIONER

## 2019-01-01 PROCEDURE — C9113 INJ PANTOPRAZOLE SODIUM, VIA: HCPCS | Performed by: HOSPITALIST

## 2019-01-01 PROCEDURE — 87081 CULTURE SCREEN ONLY: CPT | Performed by: HOSPITALIST

## 2019-01-01 PROCEDURE — 83036 HEMOGLOBIN GLYCOSYLATED A1C: CPT | Performed by: HOSPITALIST

## 2019-01-01 PROCEDURE — 85025 COMPLETE CBC W/AUTO DIFF WBC: CPT | Performed by: FAMILY MEDICINE

## 2019-01-01 PROCEDURE — 25800030 ZZH RX IP 258 OP 636: Performed by: FAMILY MEDICINE

## 2019-01-01 PROCEDURE — 93306 TTE W/DOPPLER COMPLETE: CPT | Mod: 26 | Performed by: INTERNAL MEDICINE

## 2019-01-01 PROCEDURE — 99285 EMERGENCY DEPT VISIT HI MDM: CPT | Mod: 25 | Performed by: FAMILY MEDICINE

## 2019-01-01 PROCEDURE — 84484 ASSAY OF TROPONIN QUANT: CPT | Performed by: FAMILY MEDICINE

## 2019-01-01 PROCEDURE — 83605 ASSAY OF LACTIC ACID: CPT | Performed by: HOSPITALIST

## 2019-01-01 PROCEDURE — 25000125 ZZHC RX 250: Performed by: HOSPITALIST

## 2019-01-01 PROCEDURE — 25000128 H RX IP 250 OP 636: Performed by: HOSPITALIST

## 2019-01-01 PROCEDURE — 36415 COLL VENOUS BLD VENIPUNCTURE: CPT | Performed by: FAMILY MEDICINE

## 2019-01-01 PROCEDURE — 99207 ZZC APP CREDIT; MD BILLING SHARED VISIT: CPT | Performed by: FAMILY MEDICINE

## 2019-01-01 PROCEDURE — 36415 COLL VENOUS BLD VENIPUNCTURE: CPT | Performed by: HOSPITALIST

## 2019-01-01 PROCEDURE — 25000132 ZZH RX MED GY IP 250 OP 250 PS 637: Performed by: HOSPITALIST

## 2019-01-01 PROCEDURE — 85018 HEMOGLOBIN: CPT | Performed by: FAMILY MEDICINE

## 2019-01-01 PROCEDURE — 25000128 H RX IP 250 OP 636: Performed by: FAMILY MEDICINE

## 2019-01-01 PROCEDURE — 87804 INFLUENZA ASSAY W/OPTIC: CPT | Performed by: FAMILY MEDICINE

## 2019-01-01 PROCEDURE — 83880 ASSAY OF NATRIURETIC PEPTIDE: CPT | Performed by: FAMILY MEDICINE

## 2019-01-01 PROCEDURE — 99207 ZZC APP CREDIT; MD BILLING SHARED VISIT: CPT | Performed by: HOSPITALIST

## 2019-01-01 PROCEDURE — 94660 CPAP INITIATION&MGMT: CPT

## 2019-01-01 PROCEDURE — 87040 BLOOD CULTURE FOR BACTERIA: CPT | Performed by: FAMILY MEDICINE

## 2019-01-01 PROCEDURE — 25000131 ZZH RX MED GY IP 250 OP 636 PS 637: Performed by: HOSPITALIST

## 2019-01-01 PROCEDURE — 99238 HOSP IP/OBS DSCHRG MGMT 30/<: CPT | Performed by: FAMILY MEDICINE

## 2019-01-01 PROCEDURE — 25000132 ZZH RX MED GY IP 250 OP 250 PS 637: Performed by: FAMILY MEDICINE

## 2019-01-01 PROCEDURE — 00000146 ZZHCL STATISTIC GLUCOSE BY METER IP

## 2019-01-01 PROCEDURE — 40000275 ZZH STATISTIC RCP TIME EA 10 MIN

## 2019-01-01 PROCEDURE — 40000895 ZZH STATISTIC SLP IP EVAL DEFER: Performed by: SPEECH-LANGUAGE PATHOLOGIST

## 2019-01-01 PROCEDURE — 84484 ASSAY OF TROPONIN QUANT: CPT | Performed by: HOSPITALIST

## 2019-01-01 PROCEDURE — 99285 EMERGENCY DEPT VISIT HI MDM: CPT | Mod: 25

## 2019-01-01 PROCEDURE — 25000125 ZZHC RX 250: Performed by: FAMILY MEDICINE

## 2019-01-01 PROCEDURE — 99223 1ST HOSP IP/OBS HIGH 75: CPT | Mod: AI | Performed by: HOSPITALIST

## 2019-01-01 PROCEDURE — 20000003 ZZH R&B ICU

## 2019-01-01 PROCEDURE — 80053 COMPREHEN METABOLIC PANEL: CPT | Performed by: FAMILY MEDICINE

## 2019-01-01 PROCEDURE — 96365 THER/PROPH/DIAG IV INF INIT: CPT

## 2019-01-01 PROCEDURE — 96375 TX/PRO/DX INJ NEW DRUG ADDON: CPT

## 2019-01-01 PROCEDURE — 85027 COMPLETE CBC AUTOMATED: CPT | Performed by: HOSPITALIST

## 2019-01-01 PROCEDURE — 94640 AIRWAY INHALATION TREATMENT: CPT

## 2019-01-01 PROCEDURE — 99310 SBSQ NF CARE HIGH MDM 45: CPT | Performed by: FAMILY MEDICINE

## 2019-01-01 PROCEDURE — 80048 BASIC METABOLIC PNL TOTAL CA: CPT | Performed by: HOSPITALIST

## 2019-01-01 PROCEDURE — 83605 ASSAY OF LACTIC ACID: CPT | Performed by: FAMILY MEDICINE

## 2019-01-01 PROCEDURE — 93306 TTE W/DOPPLER COMPLETE: CPT

## 2019-01-01 RX ORDER — ACETAMINOPHEN 325 MG/1
650 TABLET ORAL EVERY 4 HOURS PRN
Status: DISCONTINUED | OUTPATIENT
Start: 2019-01-01 | End: 2019-01-01 | Stop reason: HOSPADM

## 2019-01-01 RX ORDER — AMOXICILLIN 250 MG
1 CAPSULE ORAL 2 TIMES DAILY PRN
Status: DISCONTINUED | OUTPATIENT
Start: 2019-01-01 | End: 2019-01-01 | Stop reason: CLARIF

## 2019-01-01 RX ORDER — CARVEDILOL 6.25 MG/1
12.5 TABLET ORAL 2 TIMES DAILY WITH MEALS
Status: DISCONTINUED | OUTPATIENT
Start: 2019-01-01 | End: 2019-01-01

## 2019-01-01 RX ORDER — NICOTINE POLACRILEX 4 MG
15-30 LOZENGE BUCCAL
Status: DISCONTINUED | OUTPATIENT
Start: 2019-01-01 | End: 2019-01-01 | Stop reason: CLARIF

## 2019-01-01 RX ORDER — INSULIN GLARGINE 100 [IU]/ML
4 INJECTION, SOLUTION SUBCUTANEOUS 2 TIMES DAILY
Status: DISCONTINUED | OUTPATIENT
Start: 2019-01-01 | End: 2019-01-01 | Stop reason: CLARIF

## 2019-01-01 RX ORDER — ALBUTEROL SULFATE 5 MG/ML
2.5 SOLUTION RESPIRATORY (INHALATION) EVERY 4 HOURS PRN
Status: DISCONTINUED | OUTPATIENT
Start: 2019-01-01 | End: 2019-01-01 | Stop reason: CLARIF

## 2019-01-01 RX ORDER — DEXTROSE MONOHYDRATE 25 G/50ML
25-50 INJECTION, SOLUTION INTRAVENOUS
Status: DISCONTINUED | OUTPATIENT
Start: 2019-01-01 | End: 2019-01-01 | Stop reason: CLARIF

## 2019-01-01 RX ORDER — LORAZEPAM 2 MG/ML
.5-1 INJECTION INTRAMUSCULAR
Status: DISCONTINUED | OUTPATIENT
Start: 2019-01-01 | End: 2019-01-01 | Stop reason: HOSPADM

## 2019-01-01 RX ORDER — FLUTICASONE PROPIONATE 50 MCG
1 SPRAY, SUSPENSION (ML) NASAL AT BEDTIME
Status: DISCONTINUED | OUTPATIENT
Start: 2019-01-01 | End: 2019-01-01

## 2019-01-01 RX ORDER — MORPHINE SULFATE 2 MG/ML
2-4 INJECTION, SOLUTION INTRAMUSCULAR; INTRAVENOUS
Status: DISCONTINUED | OUTPATIENT
Start: 2019-01-01 | End: 2019-01-01 | Stop reason: HOSPADM

## 2019-01-01 RX ORDER — FUROSEMIDE 10 MG/ML
40 INJECTION INTRAMUSCULAR; INTRAVENOUS DAILY
Status: DISCONTINUED | OUTPATIENT
Start: 2019-01-01 | End: 2019-01-01

## 2019-01-01 RX ORDER — LIDOCAINE 40 MG/G
CREAM TOPICAL
Status: DISCONTINUED | OUTPATIENT
Start: 2019-01-01 | End: 2019-01-01 | Stop reason: HOSPADM

## 2019-01-01 RX ORDER — ONDANSETRON 4 MG/1
4 TABLET, ORALLY DISINTEGRATING ORAL EVERY 6 HOURS PRN
Status: DISCONTINUED | OUTPATIENT
Start: 2019-01-01 | End: 2019-01-01 | Stop reason: HOSPADM

## 2019-01-01 RX ORDER — NALOXONE HYDROCHLORIDE 0.4 MG/ML
.1-.4 INJECTION, SOLUTION INTRAMUSCULAR; INTRAVENOUS; SUBCUTANEOUS
Status: DISCONTINUED | OUTPATIENT
Start: 2019-01-01 | End: 2019-01-01 | Stop reason: HOSPADM

## 2019-01-01 RX ORDER — CEFTRIAXONE 1 G/1
1 INJECTION, POWDER, FOR SOLUTION INTRAMUSCULAR; INTRAVENOUS EVERY 24 HOURS
Status: DISCONTINUED | OUTPATIENT
Start: 2019-01-01 | End: 2019-01-01

## 2019-01-01 RX ORDER — ONDANSETRON 2 MG/ML
4 INJECTION INTRAMUSCULAR; INTRAVENOUS EVERY 6 HOURS PRN
Status: DISCONTINUED | OUTPATIENT
Start: 2019-01-01 | End: 2019-01-01 | Stop reason: HOSPADM

## 2019-01-01 RX ORDER — TRAZODONE HYDROCHLORIDE 50 MG/1
50 TABLET, FILM COATED ORAL AT BEDTIME
Status: DISCONTINUED | OUTPATIENT
Start: 2019-01-01 | End: 2019-01-01

## 2019-01-01 RX ORDER — IPRATROPIUM BROMIDE AND ALBUTEROL SULFATE 2.5; .5 MG/3ML; MG/3ML
3 SOLUTION RESPIRATORY (INHALATION)
Status: DISCONTINUED | OUTPATIENT
Start: 2019-01-01 | End: 2019-01-01 | Stop reason: CLARIF

## 2019-01-01 RX ORDER — LEVOFLOXACIN 5 MG/ML
500 INJECTION, SOLUTION INTRAVENOUS
Status: DISCONTINUED | OUTPATIENT
Start: 2019-04-05 | End: 2019-01-01 | Stop reason: CLARIF

## 2019-01-01 RX ORDER — MORPHINE SULFATE 100 MG/5ML
5-10 SOLUTION ORAL
Status: DISCONTINUED | OUTPATIENT
Start: 2019-01-01 | End: 2019-01-01 | Stop reason: HOSPADM

## 2019-01-01 RX ORDER — NITROGLYCERIN 0.4 MG/1
0.4 TABLET SUBLINGUAL EVERY 5 MIN PRN
Status: DISCONTINUED | OUTPATIENT
Start: 2019-01-01 | End: 2019-01-01 | Stop reason: CLARIF

## 2019-01-01 RX ORDER — SODIUM CHLORIDE 9 MG/ML
INJECTION, SOLUTION INTRAVENOUS CONTINUOUS
Status: DISCONTINUED | OUTPATIENT
Start: 2019-01-01 | End: 2019-01-01 | Stop reason: HOSPADM

## 2019-01-01 RX ORDER — METOPROLOL TARTRATE 1 MG/ML
2.5 INJECTION, SOLUTION INTRAVENOUS EVERY 8 HOURS
Status: DISCONTINUED | OUTPATIENT
Start: 2019-01-01 | End: 2019-01-01 | Stop reason: CLARIF

## 2019-01-01 RX ORDER — ASPIRIN 81 MG/1
81 TABLET, CHEWABLE ORAL DAILY
Status: DISCONTINUED | OUTPATIENT
Start: 2019-01-01 | End: 2019-01-01

## 2019-01-01 RX ORDER — ACETAMINOPHEN 650 MG/1
650 SUPPOSITORY RECTAL EVERY 4 HOURS PRN
Status: DISCONTINUED | OUTPATIENT
Start: 2019-01-01 | End: 2019-01-01 | Stop reason: HOSPADM

## 2019-01-01 RX ORDER — CARVEDILOL 25 MG/1
25 TABLET ORAL 2 TIMES DAILY WITH MEALS
Status: DISCONTINUED | OUTPATIENT
Start: 2019-01-01 | End: 2019-01-01

## 2019-01-01 RX ORDER — LORAZEPAM 2 MG/ML
1 INJECTION INTRAMUSCULAR
Status: DISCONTINUED | OUTPATIENT
Start: 2019-01-01 | End: 2019-01-01 | Stop reason: CLARIF

## 2019-01-01 RX ORDER — MORPHINE SULFATE 10 MG/5ML
5-10 SOLUTION ORAL
Status: DISCONTINUED | OUTPATIENT
Start: 2019-01-01 | End: 2019-01-01 | Stop reason: HOSPADM

## 2019-01-01 RX ORDER — HEPARIN SODIUM 5000 [USP'U]/.5ML
5000 INJECTION, SOLUTION INTRAVENOUS; SUBCUTANEOUS EVERY 8 HOURS
Status: DISCONTINUED | OUTPATIENT
Start: 2019-01-01 | End: 2019-01-01

## 2019-01-01 RX ORDER — QUETIAPINE FUMARATE 25 MG/1
25 TABLET, FILM COATED ORAL 2 TIMES DAILY
Status: DISCONTINUED | OUTPATIENT
Start: 2019-01-01 | End: 2019-01-01

## 2019-01-01 RX ORDER — AZITHROMYCIN 250 MG/1
250 TABLET, FILM COATED ORAL DAILY
Status: ON HOLD | COMMUNITY
End: 2019-01-01

## 2019-01-01 RX ORDER — FUROSEMIDE 10 MG/ML
40 INJECTION INTRAMUSCULAR; INTRAVENOUS EVERY 8 HOURS
Status: DISCONTINUED | OUTPATIENT
Start: 2019-01-01 | End: 2019-01-01

## 2019-01-01 RX ORDER — NALOXONE HYDROCHLORIDE 0.4 MG/ML
.1-.4 INJECTION, SOLUTION INTRAMUSCULAR; INTRAVENOUS; SUBCUTANEOUS
Status: DISCONTINUED | OUTPATIENT
Start: 2019-01-01 | End: 2019-01-01 | Stop reason: CLARIF

## 2019-01-01 RX ORDER — LEVETIRACETAM 500 MG/1
500 TABLET ORAL 2 TIMES DAILY
Status: DISCONTINUED | OUTPATIENT
Start: 2019-01-01 | End: 2019-01-01

## 2019-01-01 RX ORDER — AMOXICILLIN 250 MG
2 CAPSULE ORAL 2 TIMES DAILY PRN
Status: DISCONTINUED | OUTPATIENT
Start: 2019-01-01 | End: 2019-01-01 | Stop reason: CLARIF

## 2019-01-01 RX ORDER — VENLAFAXINE HYDROCHLORIDE 150 MG/1
150 CAPSULE, EXTENDED RELEASE ORAL AT BEDTIME
Status: DISCONTINUED | OUTPATIENT
Start: 2019-01-01 | End: 2019-01-01

## 2019-01-01 RX ORDER — HALOPERIDOL 5 MG/ML
2 INJECTION INTRAMUSCULAR EVERY 6 HOURS PRN
Status: DISCONTINUED | OUTPATIENT
Start: 2019-01-01 | End: 2019-01-01

## 2019-01-01 RX ORDER — ASPIRIN 300 MG/1
300 SUPPOSITORY RECTAL DAILY
Status: DISCONTINUED | OUTPATIENT
Start: 2019-01-01 | End: 2019-01-01 | Stop reason: CLARIF

## 2019-01-01 RX ORDER — LEVOFLOXACIN 5 MG/ML
750 INJECTION, SOLUTION INTRAVENOUS ONCE
Status: COMPLETED | OUTPATIENT
Start: 2019-01-01 | End: 2019-01-01

## 2019-01-01 RX ORDER — CEFTRIAXONE 1 G/1
1000 INJECTION, POWDER, FOR SOLUTION INTRAMUSCULAR; INTRAVENOUS ONCE
Status: ON HOLD | COMMUNITY
End: 2019-01-01

## 2019-01-01 RX ORDER — ACETAMINOPHEN 325 MG/1
650 TABLET ORAL 2 TIMES DAILY
Status: DISCONTINUED | OUTPATIENT
Start: 2019-01-01 | End: 2019-01-01

## 2019-01-01 RX ORDER — LORAZEPAM 0.5 MG/1
.5-1 TABLET ORAL
Status: DISCONTINUED | OUTPATIENT
Start: 2019-01-01 | End: 2019-01-01 | Stop reason: HOSPADM

## 2019-01-01 RX ORDER — FUROSEMIDE 10 MG/ML
40 INJECTION INTRAMUSCULAR; INTRAVENOUS ONCE
Status: COMPLETED | OUTPATIENT
Start: 2019-01-01 | End: 2019-01-01

## 2019-01-01 RX ORDER — CLONAZEPAM 0.5 MG/1
0.25 TABLET ORAL 2 TIMES DAILY
Qty: 30 TABLET | Refills: 1 | Status: SHIPPED | OUTPATIENT
Start: 2019-01-01 | End: 2019-01-01

## 2019-01-01 RX ADMIN — ACETAMINOPHEN 650 MG: 650 SUPPOSITORY RECTAL at 08:11

## 2019-01-01 RX ADMIN — CEFTRIAXONE SODIUM 1 G: 1 INJECTION, POWDER, FOR SOLUTION INTRAMUSCULAR; INTRAVENOUS at 08:01

## 2019-01-01 RX ADMIN — INSULIN ASPART 2 UNITS: 100 INJECTION, SOLUTION INTRAVENOUS; SUBCUTANEOUS at 23:11

## 2019-01-01 RX ADMIN — DEXTROSE AND SODIUM CHLORIDE: 5; 900 INJECTION, SOLUTION INTRAVENOUS at 14:29

## 2019-01-01 RX ADMIN — METOPROLOL TARTRATE 2.5 MG: 5 INJECTION, SOLUTION INTRAVENOUS at 23:09

## 2019-01-01 RX ADMIN — LEVOFLOXACIN 750 MG: 5 INJECTION, SOLUTION INTRAVENOUS at 14:30

## 2019-01-01 RX ADMIN — FUROSEMIDE 40 MG: 10 INJECTION, SOLUTION INTRAVENOUS at 09:26

## 2019-01-01 RX ADMIN — PANTOPRAZOLE SODIUM 40 MG: 40 INJECTION, POWDER, FOR SOLUTION INTRAVENOUS at 09:29

## 2019-01-01 RX ADMIN — AZITHROMYCIN MONOHYDRATE 500 MG: 500 INJECTION, POWDER, LYOPHILIZED, FOR SOLUTION INTRAVENOUS at 22:43

## 2019-01-01 RX ADMIN — QUETIAPINE 25 MG: 25 TABLET ORAL at 10:47

## 2019-01-01 RX ADMIN — LEVETIRACETAM 500 MG: 100 INJECTION, SOLUTION INTRAVENOUS at 07:29

## 2019-01-01 RX ADMIN — INSULIN ASPART 1 UNITS: 100 INJECTION, SOLUTION INTRAVENOUS; SUBCUTANEOUS at 14:35

## 2019-01-01 RX ADMIN — FUROSEMIDE 40 MG: 10 INJECTION, SOLUTION INTRAVENOUS at 11:24

## 2019-01-01 RX ADMIN — IPRATROPIUM BROMIDE AND ALBUTEROL SULFATE 3 ML: .5; 3 SOLUTION RESPIRATORY (INHALATION) at 00:54

## 2019-01-01 RX ADMIN — METOPROLOL TARTRATE 2.5 MG: 5 INJECTION, SOLUTION INTRAVENOUS at 06:29

## 2019-01-01 RX ADMIN — IPRATROPIUM BROMIDE AND ALBUTEROL SULFATE 3 ML: .5; 3 SOLUTION RESPIRATORY (INHALATION) at 03:27

## 2019-01-01 RX ADMIN — MORPHINE SULFATE 10 MG: 100 SOLUTION ORAL at 17:35

## 2019-01-01 RX ADMIN — INSULIN ASPART 2 UNITS: 100 INJECTION, SOLUTION INTRAVENOUS; SUBCUTANEOUS at 02:14

## 2019-01-01 RX ADMIN — ASPIRIN 81 MG 81 MG: 81 TABLET ORAL at 10:47

## 2019-01-01 RX ADMIN — LEVETIRACETAM 500 MG: 100 INJECTION, SOLUTION INTRAVENOUS at 20:24

## 2019-01-01 RX ADMIN — ASPIRIN 300 MG: 300 SUPPOSITORY RECTAL at 14:37

## 2019-01-01 RX ADMIN — ACETAMINOPHEN 650 MG: 650 SUPPOSITORY RECTAL at 19:44

## 2019-01-01 RX ADMIN — METOPROLOL TARTRATE 2.5 MG: 5 INJECTION, SOLUTION INTRAVENOUS at 14:30

## 2019-01-01 RX ADMIN — INSULIN GLARGINE 8 UNITS: 100 INJECTION, SOLUTION SUBCUTANEOUS at 10:47

## 2019-01-01 RX ADMIN — INSULIN ASPART 2 UNITS: 100 INJECTION, SOLUTION INTRAVENOUS; SUBCUTANEOUS at 06:03

## 2019-01-01 RX ADMIN — INSULIN ASPART 2 UNITS: 100 INJECTION, SOLUTION INTRAVENOUS; SUBCUTANEOUS at 06:30

## 2019-01-01 RX ADMIN — FUROSEMIDE 40 MG: 10 INJECTION, SOLUTION INTRAVENOUS at 02:00

## 2019-01-01 RX ADMIN — DEXMEDETOMIDINE HYDROCHLORIDE 0.2 MCG/KG/HR: 100 INJECTION, SOLUTION INTRAVENOUS at 23:20

## 2019-01-01 RX ADMIN — PANTOPRAZOLE SODIUM 40 MG: 40 INJECTION, POWDER, FOR SOLUTION INTRAVENOUS at 08:18

## 2019-01-01 RX ADMIN — LEVETIRACETAM 500 MG: 500 TABLET, FILM COATED ORAL at 10:47

## 2019-01-01 RX ADMIN — CEFTRIAXONE SODIUM 1 G: 1 INJECTION, POWDER, FOR SOLUTION INTRAMUSCULAR; INTRAVENOUS at 09:31

## 2019-01-01 RX ADMIN — DEXMEDETOMIDINE HYDROCHLORIDE 0.3 MCG/KG/HR: 100 INJECTION, SOLUTION INTRAVENOUS at 14:27

## 2019-01-01 RX ADMIN — INSULIN ASPART 2 UNITS: 100 INJECTION, SOLUTION INTRAVENOUS; SUBCUTANEOUS at 10:14

## 2019-01-01 RX ADMIN — TAZOBACTAM SODIUM AND PIPERACILLIN SODIUM 2.25 G: 250; 2 INJECTION, SOLUTION INTRAVENOUS at 16:33

## 2019-01-01 RX ADMIN — INSULIN ASPART 3 UNITS: 100 INJECTION, SOLUTION INTRAVENOUS; SUBCUTANEOUS at 10:47

## 2019-01-01 RX ADMIN — DEXTROSE AND SODIUM CHLORIDE: 5; 900 INJECTION, SOLUTION INTRAVENOUS at 12:20

## 2019-01-01 RX ADMIN — INSULIN ASPART 1 UNITS: 100 INJECTION, SOLUTION INTRAVENOUS; SUBCUTANEOUS at 17:57

## 2019-01-01 RX ADMIN — FUROSEMIDE 40 MG: 10 INJECTION, SOLUTION INTRAVENOUS at 11:30

## 2019-01-01 RX ADMIN — METOPROLOL TARTRATE 2.5 MG: 5 INJECTION, SOLUTION INTRAVENOUS at 14:27

## 2019-01-01 RX ADMIN — INSULIN ASPART 3 UNITS: 100 INJECTION, SOLUTION INTRAVENOUS; SUBCUTANEOUS at 14:29

## 2019-01-01 RX ADMIN — ASPIRIN 300 MG: 300 SUPPOSITORY RECTAL at 09:33

## 2019-01-01 RX ADMIN — ACETAMINOPHEN 650 MG: 650 SUPPOSITORY RECTAL at 00:10

## 2019-01-01 RX ADMIN — AZITHROMYCIN MONOHYDRATE 500 MG: 500 INJECTION, POWDER, LYOPHILIZED, FOR SOLUTION INTRAVENOUS at 02:05

## 2019-01-01 ASSESSMENT — ACTIVITIES OF DAILY LIVING (ADL)
ADLS_ACUITY_SCORE: 28
ADLS_ACUITY_SCORE: 26
ADLS_ACUITY_SCORE: 26
ADLS_ACUITY_SCORE: 27
ADLS_ACUITY_SCORE: 26
ADLS_ACUITY_SCORE: 28

## 2019-01-01 ASSESSMENT — MIFFLIN-ST. JEOR
SCORE: 1299.52
SCORE: 1299.52
SCORE: 1335.8
SCORE: 1308.59

## 2019-01-04 NOTE — LETTER
1/4/2019        RE: Jayne Vasquez  [address] [address] [place] [id number]        [facility]    Chief Complaint   Patient presents with     correction Acute       [facility] Medical Record Number:  [id number]  Place of Service where encounter took place:  [facility] ([facility] [[id number]]    HPI:    Jayne Vasquez is a 88 year old  (9/21/1930), who is being seen today for an episodic care visit.  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 Patient has difficult body habitus to assess fluid status.       Patient is on carvedilol 25 mg BID, ASA 81 mg daily, lisinopril 20 mg daily, amlodipine 7.5 mg nightly, Lasix 10 mg daily, nitroglycerin as needed  BP is variable: 98-120s/60-70s since med change  HRs 89-95 since last med change  Patient unable to comment on symptoms of CP, HA, lightheadedness     Weights being done weekly:   1/4 (after breakfast - 202  1/3 - 203 (unknown time of day)  1/1 - 196  12/25/18 - 190.2  12/21 - 202 lbs  12/18 - 202 lbs  12/11 - 201  12/4 - 199.7  11/27 - 179.1  11/20 - 119.4  11/6 - 199.3  10/23 - 197  9/18 - 192.3     Last few BMPs:   6/8/18: BUN 29, Creat 1.36, GFR 37  11/2/18: BUN 36, Creat 1.53, GFR 32  11/9/18: BUN 31, Creat 1.57, GFR 31  11/21/18: BUN 36, Creat 1.51, GFR 33  12/24/18: BUN 36, Creat 1.62, GFR 28 (K  4.8)  01/04/19: BUN 44, Creat 1.93, GFR 23          ALLERGIES: Dye [contrast dye]; Fluoxetine; Iodine-131; Methocarbamol; Paroxetine; and Penicillins  Past Medical, Surgical, Family and Social History reviewed and updated in EPIC.    Current Outpatient Medications   Medication Sig Dispense Refill     Acetaminophen (TYLENOL PO) Take 650 mg by mouth 2 times daily Also TID PRN       AmLODIPine Besylate (NORVASC PO) Take 7.5 mg by mouth At Bedtime       ASPIRIN PO Take 81 mg by mouth daily       BusPIRone HCl (BUSPAR PO) Take 7.5 mg by mouth 2 times daily        Carvedilol (COREG PO) Take 25 mg by mouth 2 times daily (with meals)       Cranberry-Vitamin C-Inulin (UTI-STAT PO) Take by mouth daily       fluticasone (FLONASE) 50 MCG/ACT nasal spray Spray 1 spray into both nostrils At Bedtime       Furosemide (LASIX PO) Take 20 mg by mouth daily        GABAPENTIN PO Take 600 mg by mouth 2 times daily        Glycerin-Dimeth-Surfactants (ONE-STEP SKIN CARE LOTION) LOTN Externally apply topically daily       hypromellose (ARTIFICIAL TEARS) 0.5 % SOLN 1 drop 3 times daily as needed for dry eyes       insulin aspart (NOVOLOG FLEXPEN) 100 UNIT/ML injection Inject 5 Units Subcutaneous 3 times daily (with meals)       insulin aspart (NOVOLOG FLEXPEN) 100 UNIT/ML injection Inject Subcutaneous 3 times daily (with meals) Sliding Scale;   If Blood Sugar is 200 to 249, give 2 Units.  If Blood Sugar is 250 to 299, give 4 Units.  If Blood Sugar is 300 to 349, give 6 Units.  If Blood Sugar is 350 to 400, give 8 Units.  If Blood Sugar is greater than 400, give 10 Units.  If Blood Sugar is greater than 401, call MD.       Insulin Glargine (BASAGLAR KWIKPEN SC) Inject 8 Units Subcutaneous 2 times daily        LevETIRAcetam (KEPPRA PO) Take 500 mg by mouth 2 times daily       LISINOPRIL PO Take 20 mg by mouth daily        nitroglycerin (NITROSTAT) 0.4 MG SL tablet Place 1 tablet (0.4 mg) under the tongue every 5 minutes as needed 25 tablet   "    nystatin (MYCOSTATIN) 487760 UNIT/GM POWD Apply topically 2 times daily as needed        polyethylene glycol (MIRALAX/GLYCOLAX) powder Take 17 g by mouth daily as needed for constipation        QUEtiapine Fumarate (SEROQUEL PO) Take 25 mg by mouth 2 times daily        QUEtiapine Fumarate (SEROQUEL PO) Take 12.5 mg by mouth daily midday       sennosides (SENOKOT) 8.6 MG tablet Take 2 tablets by mouth 2 times daily        traZODone (DESYREL) 50 MG tablet Take 1 tablet (50 mg) by mouth At Bedtime 31 tablet 12     trolamine salicylate (ASPERCREME) 10 % cream Apply topically 2 times daily as needed for moderate pain (also BID)        Venlafaxine HCl (EFFEXOR XR PO) Take 150 mg by mouth At Bedtime        Blood Glucose Monitoring Suppl MISC 3 times daily (before meals)       QUEtiapine Fumarate (SEROQUEL PO) Take 25 mg by mouth 2 times daily        Medications reviewed:  Medications reconciled to facility chart and changes were made to reflect current medications as identified as above med list. Below are the changes that were made:   Medications stopped since last EPIC medication reconciliation:   See previous visits    Medications started since last [facility] medication reconciliation:  See previous visits    REVIEW OF SYSTEMS:  Unobtainable secondary to cognitive impairment.     Physical Exam:  BP 98/62   Pulse 89   Temp 98  F (36.7  C)   Resp 16   Ht 1.575 m (5' 2\")   Wt 91.6 kg (202 lb)   SpO2 96%   BMI 36.95 kg/m     GENERAL APPEARANCE:  Alert, in no distress, pleasant, confused  RESP:  respiratory effort and palpation of chest normal, auscultation of lungs clear , no respiratory distress  CV:  Palpation and auscultation of heart done , rate and rhythm regular, no murmur, no LE peripheral edema  ABDOMEN:  Obese, normal bowel sounds, soft, nontender, MAJOR for hepatosplenomegaly or other masses d/t body habitus  M/S:   Gait and station with W/C for mobility, Digits and nails with arthritic changes, reduced muscle " mass  SKIN:  Inspection and Palpation of skin and subcutaneous tissue pale, intact  PSYCH:  insight and judgement, memory with impairment , affect and mood normal, does not follow commands readily         Recent Labs:     CBC RESULTS:   Recent Labs   Lab Test 09/07/18  0809 05/25/18  0745 03/02/18  0815   WBC 5.2  --  5.5   RBC 3.92  --  3.95   HGB 12.1 10.2* 11.8   HCT 36.2  --  37.2   MCV 92  --  94   MCH 30.9  --  29.9   MCHC 33.4  --  31.7   RDW 12.4  --  12.6   *  --  142*     Last Comprehensive Metabolic Panel:  Sodium   Date Value Ref Range Status   01/04/2019 141 133 - 144 mmol/L Final     Potassium   Date Value Ref Range Status   01/04/2019 4.9 3.4 - 5.3 mmol/L Final     Chloride   Date Value Ref Range Status   01/04/2019 105 94 - 109 mmol/L Final     Carbon Dioxide   Date Value Ref Range Status   01/04/2019 28 20 - 32 mmol/L Final     Anion Gap   Date Value Ref Range Status   01/04/2019 8 3 - 14 mmol/L Final     Glucose   Date Value Ref Range Status   01/04/2019 178 (H) 70 - 99 mg/dL Final     Urea Nitrogen   Date Value Ref Range Status   01/04/2019 44 (H) 7 - 30 mg/dL Final     Creatinine   Date Value Ref Range Status   01/04/2019 1.93 (H) 0.52 - 1.04 mg/dL Final     GFR Estimate   Date Value Ref Range Status   01/04/2019 23 (L) >60 mL/min/[1.73_m2] Final     Comment:     Non  GFR Calc  Starting 12/18/2018, serum creatinine based estimated GFR (eGFR) will be   calculated using the Chronic Kidney Disease Epidemiology Collaboration   (CKD-EPI) equation.       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 and family does nto believe patient is eating (or drinking) more than usual.    Extensive discussion with family who wishes patient to remain on 20 mg daily with recheck in 2 weeks off labs.  Patient's family upset with Lasix titration.      Possible overdiuresis versus vascular congestion.  Will keep patient on 20 mg daily of Lasix as she does have increased weight (possible congestion) but will monitor weights more frequently.  If needed, can assess BMP sooner than 2 weeks.     Orders:  1.  BMP in 2 weeks.  Dx: CHF  2.  Weights - 3 x/week before breakfast, after AM void x 2 weeks, then return to weekly.  Dx: CHF    Electronically signed by  LORI Ricks CNP                    Sincerely,        LORI Ricks CNP

## 2019-01-04 NOTE — PROGRESS NOTES
[facility]    Chief Complaint   Patient presents with     correction Acute       [facility] Record Number:  [id number]  Place of Service where encounter took place:  [facility] ([facility] [[id number]]    HPI:    Jayne Vasquez is a 88 year old  (9/21/1930), who is being seen today for an episodic care visit.  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 Patient has difficult body habitus to assess fluid status.       Patient is on carvedilol 25 mg BID, ASA 81 mg daily, lisinopril 20 mg daily, amlodipine 7.5 mg nightly, Lasix 10 mg daily, nitroglycerin as needed  BP is variable: 98-120s/60-70s since med change  HRs 89-95 since last med change  Patient unable to comment on symptoms of CP, HA, lightheadedness     Weights being done weekly:   1/4 (after breakfast - 202  1/3 - 203 (unknown time of day)  1/1 - 196  12/25/18 - 190.2  12/21 - 202 lbs  12/18 - 202 lbs  12/11 - 201  12/4 - 199.7  11/27 - 179.1  11/20 - 119.4  11/6 - 199.3  10/23 - 197  9/18 - 192.3     Last few BMPs:   6/8/18: BUN 29, Creat 1.36, GFR 37  11/2/18: BUN 36, Creat 1.53, GFR 32  11/9/18: BUN 31, Creat 1.57, GFR 31  11/21/18: BUN 36, Creat 1.51, GFR 33  12/24/18: BUN 36, Creat 1.62, GFR 28 (K 4.8)  01/04/19: BUN 44, Creat 1.93, GFR 23          ALLERGIES: Dye [contrast dye];  Fluoxetine; Iodine-131; Methocarbamol; Paroxetine; and Penicillins  Past Medical, Surgical, Family and Social History reviewed and updated in [facility].    Current Outpatient Medications   Medication Sig Dispense Refill     Acetaminophen (TYLENOL PO) Take 650 mg by mouth 2 times daily Also TID PRN       AmLODIPine Besylate (NORVASC PO) Take 7.5 mg by mouth At Bedtime       ASPIRIN PO Take 81 mg by mouth daily       BusPIRone HCl (BUSPAR PO) Take 7.5 mg by mouth 2 times daily        Carvedilol (COREG PO) Take 25 mg by mouth 2 times daily (with meals)       Cranberry-Vitamin C-Inulin (UTI-STAT PO) Take by mouth daily       fluticasone (FLONASE) 50 MCG/ACT nasal spray Spray 1 spray into both nostrils At Bedtime       Furosemide (LASIX PO) Take 20 mg by mouth daily        GABAPENTIN PO Take 600 mg by mouth 2 times daily        Glycerin-Dimeth-Surfactants (ONE-STEP SKIN CARE LOTION) LOTN Externally apply topically daily       hypromellose (ARTIFICIAL TEARS) 0.5 % SOLN 1 drop 3 times daily as needed for dry eyes       insulin aspart (NOVOLOG FLEXPEN) 100 UNIT/ML injection Inject 5 Units Subcutaneous 3 times daily (with meals)       insulin aspart (NOVOLOG FLEXPEN) 100 UNIT/ML injection Inject Subcutaneous 3 times daily (with meals) Sliding Scale;   If Blood Sugar is 200 to 249, give 2 Units.  If Blood Sugar is 250 to 299, give 4 Units.  If Blood Sugar is 300 to 349, give 6 Units.  If Blood Sugar is 350 to 400, give 8 Units.  If Blood Sugar is greater than 400, give 10 Units.  If Blood Sugar is greater than 401, call MD.       Insulin Glargine (BASAGLAR KWIKPEN SC) Inject 8 Units Subcutaneous 2 times daily        LevETIRAcetam (KEPPRA PO) Take 500 mg by mouth 2 times daily       LISINOPRIL PO Take 20 mg by mouth daily        nitroglycerin (NITROSTAT) 0.4 MG SL tablet Place 1 tablet (0.4 mg) under the tongue every 5 minutes as needed 25 tablet      nystatin (MYCOSTATIN) 204521 UNIT/GM POWD Apply topically 2 times daily as needed  "       polyethylene glycol (MIRALAX/GLYCOLAX) powder Take 17 g by mouth daily as needed for constipation        QUEtiapine Fumarate (SEROQUEL PO) Take 25 mg by mouth 2 times daily        QUEtiapine Fumarate (SEROQUEL PO) Take 12.5 mg by mouth daily midday       sennosides (SENOKOT) 8.6 MG tablet Take 2 tablets by mouth 2 times daily        traZODone (DESYREL) 50 MG tablet Take 1 tablet (50 mg) by mouth At Bedtime 31 tablet 12     trolamine salicylate (ASPERCREME) 10 % cream Apply topically 2 times daily as needed for moderate pain (also BID)        Venlafaxine HCl (EFFEXOR XR PO) Take 150 mg by mouth At Bedtime        Blood Glucose Monitoring Suppl MISC 3 times daily (before meals)       QUEtiapine Fumarate (SEROQUEL PO) Take 25 mg by mouth 2 times daily        Medications reviewed:  Medications reconciled to facility chart and changes were made to reflect current medications as identified as above med list. Below are the changes that were made:   Medications stopped since last EPIC medication reconciliation:   See previous visits    Medications started since last [facility] medication reconciliation:  See previous visits    REVIEW OF SYSTEMS:  Unobtainable secondary to cognitive impairment.     Physical Exam:  BP 98/62   Pulse 89   Temp 98  F (36.7  C)   Resp 16   Ht 1.575 m (5' 2\")   Wt 91.6 kg (202 lb)   SpO2 96%   BMI 36.95 kg/m    GENERAL APPEARANCE:  Alert, in no distress, pleasant, confused  RESP:  respiratory effort and palpation of chest normal, auscultation of lungs clear , no respiratory distress  CV:  Palpation and auscultation of heart done , rate and rhythm regular, no murmur, no LE peripheral edema  ABDOMEN:  Obese, normal bowel sounds, soft, nontender, MAJOR for hepatosplenomegaly or other masses d/t body habitus  M/S:   Gait and station with W/C for mobility, Digits and nails with arthritic changes, reduced muscle mass  SKIN:  Inspection and Palpation of skin and subcutaneous tissue pale, " intact  PSYCH:  insight and judgement, memory with impairment , affect and mood normal, does not follow commands readily         Recent Labs:     CBC RESULTS:   Recent Labs   Lab Test 09/07/18  0809 05/25/18  0745 03/02/18  0815   WBC 5.2  --  5.5   RBC 3.92  --  3.95   HGB 12.1 10.2* 11.8   HCT 36.2  --  37.2   MCV 92  --  94   MCH 30.9  --  29.9   MCHC 33.4  --  31.7   RDW 12.4  --  12.6   *  --  142*     Last Comprehensive Metabolic Panel:  Sodium   Date Value Ref Range Status   01/04/2019 141 133 - 144 mmol/L Final     Potassium   Date Value Ref Range Status   01/04/2019 4.9 3.4 - 5.3 mmol/L Final     Chloride   Date Value Ref Range Status   01/04/2019 105 94 - 109 mmol/L Final     Carbon Dioxide   Date Value Ref Range Status   01/04/2019 28 20 - 32 mmol/L Final     Anion Gap   Date Value Ref Range Status   01/04/2019 8 3 - 14 mmol/L Final     Glucose   Date Value Ref Range Status   01/04/2019 178 (H) 70 - 99 mg/dL Final     Urea Nitrogen   Date Value Ref Range Status   01/04/2019 44 (H) 7 - 30 mg/dL Final     Creatinine   Date Value Ref Range Status   01/04/2019 1.93 (H) 0.52 - 1.04 mg/dL Final     GFR Estimate   Date Value Ref Range Status   01/04/2019 23 (L) >60 mL/min/[1.73_m2] Final     Comment:     Non  GFR Calc  Starting 12/18/2018, serum creatinine based estimated GFR (eGFR) will be   calculated using the Chronic Kidney Disease Epidemiology Collaboration   (CKD-EPI) equation.       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    Extensive discussion with family who wishes patient to remain on 20 mg daily with recheck in 2 weeks off labs.  Patient's family upset with Lasix titration.      Possible overdiuresis versus vascular congestion.  Will keep patient on 20 mg daily of Lasix as she does have increased weight (possible congestion) but will monitor weights more frequently.  If needed, can assess BMP sooner than 2 weeks.     Orders:  1.  BMP in 2 weeks.  Dx: CHF  2.  Weights - 3 x/week before breakfast, after AM void x 2 weeks, then return to weekly.  Dx: CHF    Electronically signed by  LORI Ricks CNP

## 2019-01-08 NOTE — Clinical Note
"CHRIS Gonzalez.  You see this lady on Thursday.  I'll keep you updated on her labs and EKG results.  She just doesn't \"look right\" and now requiring O2, more behaviors.  Hopefully we have some answers before you see her on Thursday, or you can see what you think about her.  THANK YOU!Lisa"

## 2019-01-08 NOTE — LETTER
1/8/2019        RE: Jayne Vasquez  [address] [address] [place] [id number]        [facility]    Chief Complaint   Patient presents with     intermediate Acute       [facility] Record Number:  [id number]  Place of Service where encounter took place:  [facility] ([facility] [[id number]]    HPI:    Jayne Vasquez is a 88 year old  (9/21/1930), who is being seen today for an episodic care visit.  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    Patient had Lasix reduced from 20 mg daily to 10 mg daily, noted weight increase and then on 12/24/18 Lasix returned to 20 mg daily. Patient has difficult body habitus to assess fluid status.        Patient is on carvedilol 25 mg BID, ASA 81 mg daily, lisinopril 20 mg daily, amlodipine 7.5 mg nightly, Lasix 10 mg daily, nitroglycerin as needed    Nursing noting that patient has not been well lately but has vague symptoms.  She has recent hypoxia with Sats in low 80s on RA, O2 has been applied.  Nursing reporting that PTA to SNF patient had been O2-dependent but was weaned off.  Likely this was in part d/t medical management once admitted to SNF and  nursing monitoring.      SBP fluctuating significantly 90s-180s, depending on level of agitation.    HR irregular at today's visit, 70s per report from nursing.    Sats now 90% on 2 LPM.   Patient has been afebrile  Today patient reporting CP (may be poor historian related to cognitive impairment).  Nursing also reporting patient has been tired.     Weight overall up but consistently around 202 lbs.   01/08/19: 202 lbs  Weights being done weekly:   1/4 (after breakfast)- 202  1/3 - 203 (unknown time of day)  1/1 - 196 12/25/18 - 190.2  12/21 - 202 lbs  12/18 - 202 lbs  12/11 - 201 12/4 - 199.7  11/27 - 179.1  11/20 - 119.4  11/6 - 199.3  10/23 - 197  9/18 - 192.3     Patient visited today and is unable to comment on symptoms d/t cognitive impairment.       ALLERGIES: Dye [contrast dye]; Fluoxetine; Iodine-131; Methocarbamol; Paroxetine; and Penicillins  Past Medical, Surgical, Family and Social History reviewed and updated in [facility].    Current Outpatient Medications   Medication Sig Dispense Refill     Acetaminophen (TYLENOL PO) Take 650 mg by mouth 2 times daily Also TID PRN       AmLODIPine Besylate (NORVASC PO) Take 7.5 mg by mouth At Bedtime       ASPIRIN PO Take 81 mg by mouth daily       Blood Glucose Monitoring Suppl MISC 3 times daily (before meals)       BusPIRone HCl (BUSPAR PO) Take 7.5 mg by mouth 2 times daily        Carvedilol (COREG PO) Take 25 mg by mouth 2 times daily (with meals)       Cranberry-Vitamin C-Inulin (UTI-STAT PO) Take by mouth daily       fluticasone (FLONASE) 50 MCG/ACT nasal spray Spray 1 spray into both nostrils At Bedtime       Furosemide (LASIX PO) Take 20 mg by mouth daily        GABAPENTIN PO Take 600 mg by mouth 2 times daily        Glycerin-Dimeth-Surfactants (ONE-STEP SKIN CARE LOTION) LOTN Externally apply topically daily       hypromellose (ARTIFICIAL TEARS) 0.5 % SOLN 1 drop 3 times daily as needed for dry eyes       insulin aspart (NOVOLOG FLEXPEN) 100 UNIT/ML injection  Inject 5 Units Subcutaneous 3 times daily (with meals)       insulin aspart (NOVOLOG FLEXPEN) 100 UNIT/ML injection Inject Subcutaneous 3 times daily (with meals) Sliding Scale;   If Blood Sugar is 200 to 249, give 2 Units.  If Blood Sugar is 250 to 299, give 4 Units.  If Blood Sugar is 300 to 349, give 6 Units.  If Blood Sugar is 350 to 400, give 8 Units.  If Blood Sugar is greater than 400, give 10 Units.  If Blood Sugar is greater than 401, call MD.       Insulin Glargine (BASAGLAR KWIKPEN SC) Inject 8 Units Subcutaneous 2 times daily        LevETIRAcetam (KEPPRA PO) Take 500 mg by mouth 2 times daily       LISINOPRIL PO Take 20 mg by mouth daily        nitroglycerin (NITROSTAT) 0.4 MG SL tablet Place 1 tablet (0.4 mg) under the tongue every 5 minutes as needed 25 tablet      nystatin (MYCOSTATIN) 306776 UNIT/GM POWD Apply topically 2 times daily as needed        polyethylene glycol (MIRALAX/GLYCOLAX) powder Take 17 g by mouth daily as needed for constipation        QUEtiapine Fumarate (SEROQUEL PO) Take 25 mg by mouth 2 times daily        QUEtiapine Fumarate (SEROQUEL PO) Take 12.5 mg by mouth daily midday       sennosides (SENOKOT) 8.6 MG tablet Take 2 tablets by mouth 2 times daily        traZODone (DESYREL) 50 MG tablet Take 1 tablet (50 mg) by mouth At Bedtime 31 tablet 12     trolamine salicylate (ASPERCREME) 10 % cream Apply topically 2 times daily as needed for moderate pain (also BID)        Venlafaxine HCl (EFFEXOR XR PO) Take 150 mg by mouth At Bedtime        QUEtiapine Fumarate (SEROQUEL PO) Take 25 mg by mouth 2 times daily        Medications reviewed:  Medications reconciled to facility chart and changes were made to reflect current medications as identified as above med list. Below are the changes that were made:   Medications stopped since last EPIC medication reconciliation:   There are no discontinued medications.    Medications started since last [facility] medication reconciliation:  No orders of  "the defined types were placed in this encounter.    REVIEW OF SYSTEMS:  Unobtainable secondary to cognitive impairment.     Physical Exam:  /77   Pulse 79   Temp 98.6  F (37  C)   Resp 20   Ht 1.575 m (5' 2\")   Wt 91.6 kg (202 lb)   SpO2 91%   BMI 36.95 kg/m     GENERAL APPEARANCE:  Alert but rired, in no distress, confused per baseline, deconditioned  RESP:  respiratory effort and palpation of chest normal, auscultation of lungs clear , no respiratory distress, O2 in place at 2 LPM via NC  CV:  Palpation and auscultation of heart done , rate and rhythm irregular, no murmur, no LE peripheral edema  ABDOMEN:  Obese, normal bowel sounds, soft, nontender, MAJOR for hepatosplenomegaly or other masses  M/S:   Gait and station with W/C for mobility, Digits and nails with arthritic changes, reduced muscle mass  SKIN:  Inspection and Palpation of skin and subcutaneous tissue paler than usual, no diaphoresis, no rash appreciated  PSYCH:  insight and judgement, memory with severe impairment, affect and mood per baseline but ppossibly more anxious today at visit, does not follow commands readily         Recent Labs:     CBC RESULTS:   Recent Labs   Lab Test 09/07/18  0809 05/25/18  0745 03/02/18  0815   WBC 5.2  --  5.5   RBC 3.92  --  3.95   HGB 12.1 10.2* 11.8   HCT 36.2  --  37.2   MCV 92  --  94   MCH 30.9  --  29.9   MCHC 33.4  --  31.7   RDW 12.4  --  12.6   *  --  142*       Last Basic Metabolic Panel:  Recent Labs   Lab Test 01/04/19  0800 12/24/18  0715    140   POTASSIUM 4.9 4.8   CHLORIDE 105 103   ELIZABETH 8.5 8.4*   CO2 28 29   BUN 44* 36*   CR 1.93* 1.62*   * 136*       Lab Results   Component Value Date    A1C 7.0 11/02/2018    A1C 5.9 05/25/2018         Assessment/Plan:     Chronic atrial fibrillation (H)  Pulmonary HTN (H)  Diastolic dysfunction, left ventricle  Chest pain, unspecified type  Coronary artery disease involving native coronary artery of native heart without angina " pectoris  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  Do not want to increase BP meds or even add PRN at this time as often patient's BP will significantly drop after agitation has subsided.    Can order stat labs and EKG for monitoring.  Patient also has history of CVA/TIAs.  Seemingly no significant neuro changes today at visit but patient does not follow commands consistently to perform thorough neuro exam.  Patient is on ASA 81 mg daily for ppx.     Possible CHF exacerbation vs dehydration vs infection vs progressive decline vs (?). Will monitor closely with results of labs and EKG for monitoring.  Patient is DNR/DNI on POLST.     Likely no PNA or influenza as no other symptoms except hypoxia. LS clear. No cough. Afebrile. If symptoms present, recommend CHEST XRAY and flu swab to be sent.     Orders:  1.  BMP, CBC w/diff STAT.  Dx: CP, hypoxia, dementia, lethargy, CKD, CHF  2.  EKG STAT.  DX: irregular heart rhythm/A fib, hx CABG, CHF, HTN  3.  Daily weights x 7 days.  Dx: CHF    Electronically signed by  LORI Ricks CNP                      Sincerely,        LORI Ricks CNP

## 2019-01-08 NOTE — PROGRESS NOTES
[facility]    Chief Complaint   Patient presents with     FCI Acute       [facility] Record Number:  [id number]  Place of Service where encounter took place:  [facility] ([facility] [[id number]]    HPI:    Jayne Vasquez is a 88 year old  (9/21/1930), who is being seen today for an episodic care visit.  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    Patient had Lasix reduced from 20 mg daily to 10 mg daily, noted weight increase and then on 12/24/18 Lasix returned to 20 mg daily. Patient has difficult body habitus to assess fluid status.        Patient is on carvedilol 25 mg BID, ASA 81 mg daily, lisinopril 20 mg daily, amlodipine 7.5 mg nightly, Lasix 10 mg daily, nitroglycerin as needed    Nursing noting that patient has not been well lately but has vague symptoms.  She has recent hypoxia with Sats in low 80s on RA, O2 has been applied.  Nursing reporting that PTA to SNF patient had been O2-dependent but was weaned off.  Likely this was in part d/t medical management once admitted to SNF and nursing monitoring.      SBP fluctuating significantly 90s-180s, depending on level of  agitation.    HR irregular at today's visit, 70s per report from nursing.    Sats now 90% on 2 LPM.   Patient has been afebrile  Today patient reporting CP (may be poor historian related to cognitive impairment).  Nursing also reporting patient has been tired.     Weight overall up but consistently around 202 lbs.   01/08/19: 202 lbs  Weights being done weekly:   1/4 (after breakfast)- 202  1/3 - 203 (unknown time of day)  1/1 - 196 12/25/18 - 190.2  12/21 - 202 lbs  12/18 - 202 lbs  12/11 - 201 12/4 - 199.7  11/27 - 179.1  11/20 - 119.4  11/6 - 199.3  10/23 - 197 9/18 - 192.3     Patient visited today and is unable to comment on symptoms d/t cognitive impairment.       ALLERGIES: Dye [contrast dye]; Fluoxetine; Iodine-131; Methocarbamol; Paroxetine; and Penicillins  Past Medical, Surgical, Family and Social History reviewed and updated in [facility].    Current Outpatient Medications   Medication Sig Dispense Refill     Acetaminophen (TYLENOL PO) Take 650 mg by mouth 2 times daily Also TID PRN       AmLODIPine Besylate (NORVASC PO) Take 7.5 mg by mouth At Bedtime       ASPIRIN PO Take 81 mg by mouth daily       Blood Glucose Monitoring Suppl MISC 3 times daily (before meals)       BusPIRone HCl (BUSPAR PO) Take 7.5 mg by mouth 2 times daily        Carvedilol (COREG PO) Take 25 mg by mouth 2 times daily (with meals)       Cranberry-Vitamin C-Inulin (UTI-STAT PO) Take by mouth daily       fluticasone (FLONASE) 50 MCG/ACT nasal spray Spray 1 spray into both nostrils At Bedtime       Furosemide (LASIX PO) Take 20 mg by mouth daily        GABAPENTIN PO Take 600 mg by mouth 2 times daily        Glycerin-Dimeth-Surfactants (ONE-STEP SKIN CARE LOTION) LOTN Externally apply topically daily       hypromellose (ARTIFICIAL TEARS) 0.5 % SOLN 1 drop 3 times daily as needed for dry eyes       insulin aspart (NOVOLOG FLEXPEN) 100 UNIT/ML injection Inject 5 Units Subcutaneous 3 times daily (with meals)       insulin aspart (NOVOLOG  FLEXPEN) 100 UNIT/ML injection Inject Subcutaneous 3 times daily (with meals) Sliding Scale;   If Blood Sugar is 200 to 249, give 2 Units.  If Blood Sugar is 250 to 299, give 4 Units.  If Blood Sugar is 300 to 349, give 6 Units.  If Blood Sugar is 350 to 400, give 8 Units.  If Blood Sugar is greater than 400, give 10 Units.  If Blood Sugar is greater than 401, call MD.       Insulin Glargine (BASAGLAR KWIKPEN SC) Inject 8 Units Subcutaneous 2 times daily        LevETIRAcetam (KEPPRA PO) Take 500 mg by mouth 2 times daily       LISINOPRIL PO Take 20 mg by mouth daily        nitroglycerin (NITROSTAT) 0.4 MG SL tablet Place 1 tablet (0.4 mg) under the tongue every 5 minutes as needed 25 tablet      nystatin (MYCOSTATIN) 852067 UNIT/GM POWD Apply topically 2 times daily as needed        polyethylene glycol (MIRALAX/GLYCOLAX) powder Take 17 g by mouth daily as needed for constipation        QUEtiapine Fumarate (SEROQUEL PO) Take 25 mg by mouth 2 times daily        QUEtiapine Fumarate (SEROQUEL PO) Take 12.5 mg by mouth daily midday       sennosides (SENOKOT) 8.6 MG tablet Take 2 tablets by mouth 2 times daily        traZODone (DESYREL) 50 MG tablet Take 1 tablet (50 mg) by mouth At Bedtime 31 tablet 12     trolamine salicylate (ASPERCREME) 10 % cream Apply topically 2 times daily as needed for moderate pain (also BID)        Venlafaxine HCl (EFFEXOR XR PO) Take 150 mg by mouth At Bedtime        QUEtiapine Fumarate (SEROQUEL PO) Take 25 mg by mouth 2 times daily        Medications reviewed:  Medications reconciled to facility chart and changes were made to reflect current medications as identified as above med list. Below are the changes that were made:   Medications stopped since last EPIC medication reconciliation:   There are no discontinued medications.    Medications started since last [facility] medication reconciliation:  No orders of the defined types were placed in this encounter.    REVIEW OF SYSTEMS:  Unobtainable  "secondary to cognitive impairment.     Physical Exam:  /77   Pulse 79   Temp 98.6  F (37  C)   Resp 20   Ht 1.575 m (5' 2\")   Wt 91.6 kg (202 lb)   SpO2 91%   BMI 36.95 kg/m    GENERAL APPEARANCE:  Alert but rired, in no distress, confused per baseline, deconditioned  RESP:  respiratory effort and palpation of chest normal, auscultation of lungs clear , no respiratory distress, O2 in place at 2 LPM via NC  CV:  Palpation and auscultation of heart done , rate and rhythm irregular, no murmur, no LE peripheral edema  ABDOMEN:  Obese, normal bowel sounds, soft, nontender, MAJOR for hepatosplenomegaly or other masses  M/S:   Gait and station with W/C for mobility, Digits and nails with arthritic changes, reduced muscle mass  SKIN:  Inspection and Palpation of skin and subcutaneous tissue paler than usual, no diaphoresis, no rash appreciated  PSYCH:  insight and judgement, memory with severe impairment, affect and mood per baseline but ppossibly more anxious today at visit, does not follow commands readily         Recent Labs:     CBC RESULTS:   Recent Labs   Lab Test 09/07/18  0809 05/25/18  0745 03/02/18  0815   WBC 5.2  --  5.5   RBC 3.92  --  3.95   HGB 12.1 10.2* 11.8   HCT 36.2  --  37.2   MCV 92  --  94   MCH 30.9  --  29.9   MCHC 33.4  --  31.7   RDW 12.4  --  12.6   *  --  142*       Last Basic Metabolic Panel:  Recent Labs   Lab Test 01/04/19  0800 12/24/18  0715    140   POTASSIUM 4.9 4.8   CHLORIDE 105 103   ELIZABETH 8.5 8.4*   CO2 28 29   BUN 44* 36*   CR 1.93* 1.62*   * 136*       Lab Results   Component Value Date    A1C 7.0 11/02/2018    A1C 5.9 05/25/2018         Assessment/Plan:     Chronic atrial fibrillation (H)  Pulmonary HTN (H)  Diastolic dysfunction, left ventricle  Chest pain, unspecified type  Coronary artery disease involving native coronary artery of native heart without angina pectoris  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  Do not want to increase BP meds or even add PRN at this time as often patient's BP will significantly drop after agitation has subsided.    Can order stat labs and EKG for monitoring.  Patient also has history of CVA/TIAs.  Seemingly no significant neuro changes today at visit but patient does not follow commands consistently to perform thorough neuro exam.  Patient is on ASA 81 mg daily for ppx.     Possible CHF exacerbation vs dehydration vs infection vs progressive decline vs (?). Will monitor closely with results of labs and EKG for monitoring.  Patient is DNR/DNI on POLST.     Likely no PNA or influenza as no other symptoms except hypoxia. LS clear. No cough. Afebrile. If symptoms present, recommend CHEST XRAY and flu swab to be sent.     Nursing later noted that when toileting patient, she c/o dysuria (burning with urination).  Will send UA/UC.     Orders:  1.  BMP, CBC w/diff STAT.  Dx: CP, hypoxia, dementia, lethargy, CKD, CHF  2.  EKG STAT.  DX: irregular heart rhythm/A fib, hx CABG, CHF, HTN  3.  Daily weights x 7 days.  Dx: CHF  4. UA/UC Dx: dysuria    Electronically signed by  LORI Ricks CNP

## 2019-01-09 NOTE — PROGRESS NOTES
[facility]  Chief Complaint   Patient presents with     prison Regulatory   [facility] Record Number:  [id number]  Location: [facility].   HPI:    Jayne Vasquez is a 87 year old  (9/21/1930), who is being seen today for a federally mandated E/M visit at [facility] .  HPI information obtained from: facility staff and [facility] Epic chart review.     Today's concerns are:  -  - Resident seen and examined.  NP reports Resident is declining, has hypoxia required O2 supplement, started on levaquin yesterday for possible pna, blood work no revealing, EKG showed some T wave changes in the lateral lead. UA sent out for UCx.   -  RN reports that SpO2% drops to mid 70's w/o O2, up around 90 on 3 liter of O2. -RN reports Resident had outing last week, and since her return she has been tired and no energy, and has congested lung. .   - Resident seen and examined, reports feeling tired, and does not know what is going on with her. Denies feeling warm or chills. Denies chest pain.   --------------------------------  - - Past Medical, social, family histories, medications, and allergies reviewed and updated  - Medications reviewed: in the chart and EHR.   - Case Management:   I have reviewed the care plan and MDS and do agree with the plan. Patient's desire to return to the community is not present.  Information reviewed:  Medications, vital signs, orders, and nursing notes.    MEDICATIONS:  Current Outpatient Medications   Medication Sig Dispense Refill     Acetaminophen (TYLENOL PO) Take 650 mg by mouth 2 times daily Also TID PRN       AmLODIPine Besylate (NORVASC PO) Take 7.5 mg by mouth At Bedtime       ASPIRIN PO Take 81 mg by mouth daily       BusPIRone HCl (BUSPAR PO) Take 7.5 mg by mouth 2 times daily        Carvedilol (COREG PO) Take 25 mg by mouth 2 times daily (with meals)       Cranberry-Vitamin C-Inulin (UTI-STAT PO) Take by mouth daily       fluticasone (FLONASE) 50  MCG/ACT nasal spray Spray 1 spray into both nostrils At Bedtime       Furosemide (LASIX PO) Take 20 mg by mouth daily        GABAPENTIN PO Take 600 mg by mouth 2 times daily        Glycerin-Dimeth-Surfactants (ONE-STEP SKIN CARE LOTION) LOTN Externally apply topically daily       hypromellose (ARTIFICIAL TEARS) 0.5 % SOLN 1 drop 3 times daily as needed for dry eyes       insulin aspart (NOVOLOG FLEXPEN) 100 UNIT/ML injection Inject 5 Units Subcutaneous 3 times daily (with meals)       insulin aspart (NOVOLOG FLEXPEN) 100 UNIT/ML injection Inject Subcutaneous 3 times daily (with meals) Sliding Scale;   If Blood Sugar is 200 to 249, give 2 Units.  If Blood Sugar is 250 to 299, give 4 Units.  If Blood Sugar is 300 to 349, give 6 Units.  If Blood Sugar is 350 to 400, give 8 Units.  If Blood Sugar is greater than 400, give 10 Units.  If Blood Sugar is greater than 401, call MD.       Insulin Glargine (BASAGLAR KWIKPEN SC) Inject 8 Units Subcutaneous 2 times daily        LevETIRAcetam (KEPPRA PO) Take 500 mg by mouth 2 times daily       LISINOPRIL PO Take 20 mg by mouth daily        nitroglycerin (NITROSTAT) 0.4 MG SL tablet Place 1 tablet (0.4 mg) under the tongue every 5 minutes as needed 25 tablet      nystatin (MYCOSTATIN) 235352 UNIT/GM POWD Apply topically 2 times daily as needed        polyethylene glycol (MIRALAX/GLYCOLAX) powder Take 17 g by mouth daily as needed for constipation        QUEtiapine Fumarate (SEROQUEL PO) Take 25 mg by mouth 2 times daily        QUEtiapine Fumarate (SEROQUEL PO) Take 12.5 mg by mouth daily midday       sennosides (SENOKOT) 8.6 MG tablet Take 2 tablets by mouth 2 times daily        traZODone (DESYREL) 50 MG tablet Take 1 tablet (50 mg) by mouth At Bedtime 31 tablet 12     trolamine salicylate (ASPERCREME) 10 % cream Apply topically 2 times daily as needed for moderate pain (also BID)        Venlafaxine HCl (EFFEXOR XR PO) Take 150 mg by mouth At Bedtime        Blood Glucose  "Monitoring Suppl MISC 3 times daily (before meals)       QUEtiapine Fumarate (SEROQUEL PO) Take 25 mg by mouth 2 times daily        Medications reviewed: Medications stopped since last EPIC medication reconciliation:     hydrocortisone (ANUSOL-HC) 2.5 % rectal cream      ketotifen (ZADITOR/REFRESH ANTI-ITCH) 0.025 % SOLN ophthalmic solution      LEVOTHYROXINE SODIUM PO      Multiple Vitamin (TAB-A-LORETTA PO)      mupirocin (BACTROBAN) 2 % ointment      sorbitol 70 % SOLN solution      ROS: very limited secondary to cognitive impairment, and negative except as in HPI.     Exam:  Vitals: /60   Pulse 80   Temp 98  F (36.7  C)   Resp 16   Ht 1.575 m (5' 2\")   Wt 92.5 kg (204 lb)   SpO2 (!) 88%   BMI 37.31 kg/m    BMI= Body mass index is 37.31 kg/m .  GENERAL APPEARANCE:  very tired looking.  RESP:  respiratory effort and palpation of chest normal.    CV:  Palpation and auscultation of heart done , regular rate and rhythm, Systolic murmur, rub, or gallop, no pedal edema  ABDOMEN:  normal bowel sounds, soft, nontender, no hepatosplenomegaly or other masses  M/S:  no joint deformity noted on observation.   SKIN:  warm. Inspection of skin and subcutaneous tissue baseline, Palpation of skin and subcutaneous tissue baseline  NEURO:   No neuro focal deficit noted  PSYCH:  confused, very tired looking.       Lab/Diagnostic data:   CBC RESULTS:   Recent Labs   Lab Test 01/08/19  1445 09/07/18  0809   WBC 4.8 5.2   RBC 2.96* 3.92   HGB 9.4* 12.1   HCT 29.8* 36.2   * 92   MCH 31.8 30.9   MCHC 31.5 33.4   RDW 13.5 12.4   * 130*       Last Basic Metabolic Panel:  Recent Labs   Lab Test 01/08/19  1445 01/04/19  0800    141   POTASSIUM 4.7 4.9   CHLORIDE 105 105   ELIZABETH 8.5 8.5   CO2 29 28   BUN 41* 44*   CR 1.70* 1.93*   * 178*       Lab Results   Component Value Date    A1C 7.0 11/02/2018    A1C 5.9 05/25/2018   ===============================================================  ASSESSMENT/PLAN:  # " Acute hypoxic respiratory failure:  - on going since her return from outing several days ago. Continued to require O2.  Blood work up unrevealing. Started on levaquin yesterday by NP.   - possible viral respiratory infection. However, no wheezing.   - will order CXR, discussed with NP, to monitor, and if no improvement to send to the hospital.   - In this frail elderly with very limited life expectancy we recommend comfort care/hospice.     # Pancytopenia:  - mild, new, possible 2/2 to ongoing infection.  Will need to monitor lab.     Type 2 diabetes mellitus with complication, with long-term current use of insulin (H)   - HbA1C 7.0. Over controlled in this frail elderly with limited life expectancy.   -  Keep HbA1C b/w 8-9% (per AGS there is a potential harm in lowering A1C to <6.5 % in older adults with diabetes), life expectancy less than 5 years, tight glucose control is note recommended.  -  In this frail elderly adult with a limited life expectancy, the goal of  the management is to address the hyperglycemia sx if any,  rather than the  BGs numbers per se.      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 Now on levaquin, monitor for worsening cognitive status.   - on multiple neuroleptic medications.      Hx of Nontraumatic hemorrhage of cerebral hemisphere, unspecified laterality (H)  - Late effects from fall resulting in hemorrhage of cerebral hemisphere in the past.   - Pt is a WC self propel with feet. ADLs dependant.    - Stable      Moderate Major Depression:  - on meds, stable.     CKD:  - stage IV  - - Avoid nephrotoxic drugs  - Renal dose the medications.      CAD, hx of MI/ HTN  - - EKG showed new slight T wave inversion in the lateral lead, but not steep enough, no Q wave. Already on ASA, Coreg and lisinopril, and lasix    Frail elderly  - Significant  Deficits requiring NH placement. Requiring extensive  assistance from nursing. Up for meals only o/w spends the day resting in bed     Vascular dementia with behavior disturbance  - Continue to anticipate needs. Chronic condition, ongoing decline expected.   -  Continue to provide redirection and reassurance as needed. Maintain safe living situation with goals focused on comfort.    Orders:  - will order CXR 2 view  - See above, otherwise, continue the rest of the current POC.     Electronically signed by:  Jesse Gonzalez MD

## 2019-01-10 NOTE — LETTER
1/10/2019        RE: Jayne Vasquez  [address] [address] [place] [id number]            [facility]  Chief Complaint   Patient presents with     skilled nursing Regulatory   [facility] Medical Record Number:  [id number]  Location: [facility].   HPI:    Jayne Vasquez is a 87 year old  (9/21/1930), who is being seen today for a federally mandated E/M visit at [facility] .  HPI information obtained from: facility staff and [facility] chart review.     Today's concerns are:  -  - Resident seen and examined.  NP reports Resident is declining, has hypoxia required O2 supplement, started on levaquin yesterday for possible pna, blood work no revealing, EKG showed some T wave changes in the lateral lead. UA sent out for UCx.   -  RN reports that SpO2% drops to mid 70's w/o O2, up around 90 on 3 liter of O2. -RN reports Resident had outing last week, and since her return she has been tired and no energy, and has congested lung. .   - Resident seen and examined, reports feeling tired, and does not know what is going on with her. Denies feeling warm or chills. Denies chest pain.   --------------------------------  - - Past Medical, social, family histories, medications, and allergies reviewed and updated  - Medications reviewed: in the chart and EHR.   - Case Management:   I have reviewed the care plan and MDS and do agree with the plan. Patient's desire to return to the community is not present.  Information reviewed:  Medications, vital signs, orders, and nursing notes.    MEDICATIONS:  Current Outpatient Medications   Medication Sig Dispense Refill     Acetaminophen (TYLENOL PO) Take 650 mg by mouth 2 times daily Also TID PRN       AmLODIPine Besylate (NORVASC PO) Take 7.5 mg by mouth At Bedtime       ASPIRIN PO Take 81 mg by mouth daily       BusPIRone HCl (BUSPAR PO) Take 7.5 mg by mouth 2 times daily        Carvedilol (COREG PO) Take 25 mg by mouth 2 times daily (with meals)        Cranberry-Vitamin C-Inulin (UTI-STAT PO) Take by mouth daily       fluticasone (FLONASE) 50 MCG/ACT nasal spray Spray 1 spray into both nostrils At Bedtime       Furosemide (LASIX PO) Take 20 mg by mouth daily        GABAPENTIN PO Take 600 mg by mouth 2 times daily        Glycerin-Dimeth-Surfactants (ONE-STEP SKIN CARE LOTION) LOTN Externally apply topically daily       hypromellose (ARTIFICIAL TEARS) 0.5 % SOLN 1 drop 3 times daily as needed for dry eyes       insulin aspart (NOVOLOG FLEXPEN) 100 UNIT/ML injection Inject 5 Units Subcutaneous 3 times daily (with meals)       insulin aspart (NOVOLOG FLEXPEN) 100 UNIT/ML injection Inject Subcutaneous 3 times daily (with meals) Sliding Scale;   If Blood Sugar is 200 to 249, give 2 Units.  If Blood Sugar is 250 to 299, give 4 Units.  If Blood Sugar is 300 to 349, give 6 Units.  If Blood Sugar is 350 to 400, give 8 Units.  If Blood Sugar is greater than 400, give 10 Units.  If Blood Sugar is greater than 401, call MD.       Insulin Glargine (BASAGLAR KWIKPEN SC) Inject 8 Units Subcutaneous 2 times daily        LevETIRAcetam (KEPPRA PO) Take 500 mg by mouth 2 times daily       LISINOPRIL PO Take 20 mg by mouth daily        nitroglycerin (NITROSTAT) 0.4 MG SL tablet Place 1 tablet (0.4 mg) under the tongue every 5 minutes as needed 25 tablet      nystatin (MYCOSTATIN) 062904 UNIT/GM POWD Apply topically 2 times daily as needed        polyethylene glycol (MIRALAX/GLYCOLAX) powder Take 17 g by mouth daily as needed for constipation        QUEtiapine Fumarate (SEROQUEL PO) Take 25 mg by mouth 2 times daily        QUEtiapine Fumarate (SEROQUEL PO) Take 12.5 mg by mouth daily midday       sennosides (SENOKOT) 8.6 MG tablet Take 2 tablets by mouth 2 times daily        traZODone (DESYREL) 50 MG tablet Take 1 tablet (50 mg) by mouth At Bedtime 31 tablet 12     trolamine salicylate (ASPERCREME) 10 % cream Apply topically 2 times daily as needed for moderate pain (also BID)         "Venlafaxine HCl (EFFEXOR XR PO) Take 150 mg by mouth At Bedtime        Blood Glucose Monitoring Suppl MISC 3 times daily (before meals)       QUEtiapine Fumarate (SEROQUEL PO) Take 25 mg by mouth 2 times daily        Medications reviewed: Medications stopped since last EPIC medication reconciliation:     hydrocortisone (ANUSOL-HC) 2.5 % rectal cream      ketotifen (ZADITOR/REFRESH ANTI-ITCH) 0.025 % SOLN ophthalmic solution      LEVOTHYROXINE SODIUM PO      Multiple Vitamin (TAB-A-LORETTA PO)      mupirocin (BACTROBAN) 2 % ointment      sorbitol 70 % SOLN solution      ROS: very limited secondary to cognitive impairment, and negative except as in HPI.     Exam:  Vitals: /60   Pulse 80   Temp 98  F (36.7  C)   Resp 16   Ht 1.575 m (5' 2\")   Wt 92.5 kg (204 lb)   SpO2 (!) 88%   BMI 37.31 kg/m     BMI= Body mass index is 37.31 kg/m .  GENERAL APPEARANCE:  very tired looking.  RESP:  respiratory effort and palpation of chest normal.    CV:  Palpation and auscultation of heart done , regular rate and rhythm, Systolic murmur, rub, or gallop, no pedal edema  ABDOMEN:  normal bowel sounds, soft, nontender, no hepatosplenomegaly or other masses  M/S:  no joint deformity noted on observation.   SKIN:  warm. Inspection of skin and subcutaneous tissue baseline, Palpation of skin and subcutaneous tissue baseline  NEURO:   No neuro focal deficit noted  PSYCH:  confused, very tired looking.       Lab/Diagnostic data:   CBC RESULTS:   Recent Labs   Lab Test 01/08/19  1445 09/07/18  0809   WBC 4.8 5.2   RBC 2.96* 3.92   HGB 9.4* 12.1   HCT 29.8* 36.2   * 92   MCH 31.8 30.9   MCHC 31.5 33.4   RDW 13.5 12.4   * 130*       Last Basic Metabolic Panel:  Recent Labs   Lab Test 01/08/19  1445 01/04/19  0800    141   POTASSIUM 4.7 4.9   CHLORIDE 105 105   ELIZABETH 8.5 8.5   CO2 29 28   BUN 41* 44*   CR 1.70* 1.93*   * 178*       Lab Results   Component Value Date    A1C 7.0 11/02/2018    A1C 5.9 05/25/2018 "   ===============================================================  ASSESSMENT/PLAN:  # Acute hypoxic respiratory failure:  - on going since her return from outing several days ago. Continued to require O2.  Blood work up unrevealing. Started on levaquin yesterday by NP.   - possible viral respiratory infection. However, no wheezing.   - will order CXR, discussed with NP, to monitor, and if no improvement to send to the hospital.   - In this frail elderly with very limited life expectancy we recommend comfort care/hospice.     # Pancytopenia:  - mild, new, possible 2/2 to ongoing infection.  Will need to monitor lab.     Type 2 diabetes mellitus with complication, with long-term current use of insulin (H)   - HbA1C 7.0. Over controlled in this frail elderly with limited life expectancy.   -  Keep HbA1C b/w 8-9% (per AGS there is a potential harm in lowering A1C to <6.5 % in older adults with diabetes), life expectancy less than 5 years, tight glucose control is note recommended.  -  In this frail elderly adult with a limited life expectancy, the goal of  the management is to address the hyperglycemia sx if any,  rather than the  BGs numbers per se.      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 Now on levaquin, monitor for worsening cognitive status.   - on multiple neuroleptic medications.      Hx of Nontraumatic hemorrhage of cerebral hemisphere, unspecified laterality (H)  - Late effects from fall resulting in hemorrhage of cerebral hemisphere in the past.   - Pt is a WC self propel with feet. ADLs dependant.    - Stable      Moderate Major Depression:  - on meds, stable.     CKD:  - stage IV  - - Avoid nephrotoxic drugs  - Renal dose the medications.      CAD, hx of MI/ HTN  - - EKG showed new slight T wave inversion in the lateral lead, but not steep enough, no Q wave. Already on ASA, Coreg and lisinopril, and  lasix    Frail elderly  - Significant  Deficits requiring NH placement. Requiring extensive assistance from nursing. Up for meals only o/w spends the day resting in bed     Vascular dementia with behavior disturbance  - Continue to anticipate needs. Chronic condition, ongoing decline expected.   -  Continue to provide redirection and reassurance as needed. Maintain safe living situation with goals focused on comfort.    Orders:  - will order CXR 2 view  - See above, otherwise, continue the rest of the current POC.     Electronically signed by:  Jesse Gonzalez MD  Sincerely,        Jesse Gonzalez MD

## 2019-03-08 NOTE — PROGRESS NOTES
[facility]  Chief Complaint   Patient presents with     Annual Comprehensive Nursing Home     [facility] Record Number:  [id number]  Place of Service where encounter took place:  [facility] [[id number]]    HPI:    Jayne Vasquez  is a 88 year old  (9/21/1930), who is being seen today for an annual comprehensive visit. HPI information obtained from: facility chart records, facility staff, patient report and [facility] chart review.  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)  Lab Results   Component Value Date    A1C 7.0 11/02/2018    A1C 5.9 05/25/2018    A1C 7.7 12/27/2017    A1C 7.6 07/31/2017    A1C 7.5 04/26/2017     Metformin DC'd in 2017  Patient on basal glargine 8 units twice daily, NovoLog 5 units 3 times daily before meals and sliding scale insulin    Blood glucose monitoring:  AMs: 138-220 (0-2 units sliding scale insulin)  Lunch: 189-280 (2-4 units sliding scale insulin)  Dinner: 239-326 (2-6 sliding scale insulin)    Patient has history of neuropathy in her feet - on Gabapentin 600 mg BID (weaned down for CKD)    CKD (chronic kidney disease) stage 3, GFR 30-59 ml/min (H)  BL seemingly now around 1.5-1.7 (up from past)  Cardio-renal  Last few BMPs:   12/24/19: BUN 36, Creat 1.62, GFR 28  1/18/19: BUN 36, Creat 1.57, GFR 29  3/1/19: BUN 31, Creat 1.56, GFR 29    Patient is on Lasix, lisinopril  Patient is on ASA, no statin d/t advanced age    Chronic obstructive pulmonary disease, unspecified COPD type (H)  On no controller meds  On fluticasone 50 mcg/act q HS both cares for rhinitis  Now on continuous O2 - 2 LPM (since last CHF exacerbation)  Sats 90-92%   Patient denies SOB (may be poor historian d/t cognitive impairment)    Morbid obesity (H)  Body mass index is 34.84 kg/m .  Patient has HTN, CKD, DM2 as well      Atherosclerotic peripheral vascular disease with intermittent claudication (H)  Patient often  c/o pain in her feet which may be PVD vs neuropathy  Patient is on gabapentin 600 mg BID  Patient is on ASA 81 mg daily, no statin d/t advanced age     Chronic atrial fibrillation (H)  On ASA 81 mg daily, no other OA d/t CVA bleed  On carvedilol 25 mg BID for rate control    HR 80-90s per 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)  Hyperlipidemia LDL goal <100  Old myocardial infarction  CAD s/p CABG 2012  Mitral valve insufficiency, unspecified etiology  Aortic stenosis, mild - per Echo  6/17/15 ECHO with EF 60-65% with LVH  On amlodipine 7.5 mg nightly, ASA 81 mg daily, carvedilol 25 mg twice daily, Lasix 20 mg every morning, lisinopril 20 mg daily, nitroglycerin as needed    BPs variable: 110-130s/50-60s  HRs 80-90s  Patient unable to comment effectively on symptoms of CP, SOB, HA, lightheadedness d/t cognitive impairment    Weights fluctuate:  2/5/19 - 200  2/12/19 - 204  2/23/19 - 200.4  2/26/19 - 198.6  2/28/19 - 200.4  3/5/19 - 203    Since last CHF exacerbation; patient unable to get weaned from O2 despite seemingly being euvolemic.     Hx of Nontraumatic hemorrhage of cerebral hemisphere, unspecified laterality (H)  Convulsions, unspecified convulsion type (H)  History of CVA in 2010 after a fall at home with seizures post CVA  On ASA 81 mg daily, levetiracetam 500 mg BID  Patient has word-finding troubles (unkown if this is from progressive dementia or post-CVA).        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   Patient on Effexor  mg nightly, (levetiracetam 500 mg BID for seizure ppx), Seroquel 25 mg twice daily in a.m. and p.m. and 12.5 mg midday  Patient is also on BuSpar but unfortunately this has been on hold due to inability to obtain from pharmacy/.  Patient was started on clonazepam for behaviors when buspar stopped - then was very lethargic so clonazepam was stopped.      Patient not easily redirectable  A&O x 1 (self/family)  Assist of 1 with ADLs, grooming  Patient at high fall risk due to not remembering to use call light and self transfers    PHQ9 - 3/27  BIMS 2/15    Gastroesophageal reflux disease, esophagitis presence not specified  On no medication  Patient unable to effectively comment on symptoms of heartburn or upset stomach    Pain  Patient on Tylenol 650 mg twice daily and 3 times daily as needed  Patient is a poor historian due to cognitive impairment but today reports her pain is \"better\"  Patient expresses pain with palpation of ankles (has history of neuropathy and PVD)    Anemia due to stage 3 chronic kidney disease (H)  Hemoglobin   Date Value Ref Range Status   03/01/2019 9.9 (L) 11.7 - 15.7 g/dL Final   01/08/2019 9.4 (L) 11.7 - 15.7 g/dL Final     9/7/18 - 12.1  No bleeding noted. Nursing denies any black, tarry or red stools  Last Fe studies - 2015  No folate or Vit B12 level on file.   Patient does have CKD3-4    Slow transit constipation  On MiraLAX 17 g daily as needed, senna 2 tabs twice daily  Nursing and E HR noting regular BMs         ALLERGIES: Dye [contrast dye]; Fluoxetine; Iodine-131; Methocarbamol; Paroxetine; and Penicillins  PAST MEDICAL HISTORY:  has a past medical history of Anemia, unspecified (11/04/10), Aphasia (02/12/10), Cataracts, Cerebral embolism with cerebral infarction (H) (02/16/10), Chest pain (11/04/10), Confusion (01/30/10), Congestive heart failure, " unspecified, Coronary atherosclerosis of unspecified type of vessel, native or graft (05/20/08), Diabetes mellitus (H), Diabetic eye exam (H) (3/26/15), Diabetic infection of left foot (2/26/2013), Diastolic dysfunction, left ventricle, grade I by Echo (4/2/2012), GERD (gastroesophageal reflux disease) (11/04/10), GI bleed, History of blood transfusion, History of recurrent UTIs, Hyperlipemia (4.22.11), Hypertension, Hyponatremia, Intermediate coronary syndrome (H), Labral tear of long head of biceps tendon, LVH (left ventricular hypertrophy) due to hypertensive disease - mild-moderate (4/2/2012), Open wound of left foot in 4th interdigital space (2/26/2013), Osteoarthrosis, shoulder region (07/19/09), Rotator cuff tear, Seizure disorder, secondary (H) (02/16/10), Subacromial bursitis, Transient cerebral ischemia (3/30/2012), Unspecified cerebral artery occlusion with cerebral infarction, and Vitamin D deficiency (02/08/10). She also has no past medical history of Asthma, Malignant neoplasm (H), or Thyroid disease.  PAST SURGICAL HISTORY:  has a past surgical history that includes appendectomy; Cholecystectomy; Hysterectomy; surgical history of - ; colonoscopy; cataract iol, rt/lt; surgical history of - ; surgical history of -  (5/2008); surgical history of - ; surgical history of - ; colonoscopy (5/31/11); surgical history of -  (5/31/11); cardiac catherization (05/20/08); Esophagoscopy, gastroscopy, duodenoscopy (EGD), combined (4/5/2012); Bypass graft artery coronary (4/9/2012); and Phacoemulsification with standard intraocular lens implant (Right, 4/16/2015).  IMMUNIZATIONS:  Immunization History   Administered Date(s) Administered     Influenza (High Dose) 3 valent vaccine 10/07/2011, 11/20/2015     Influenza (IIV3) PF 09/25/2012, 10/01/2013, 10/01/2014     Pneumococcal 23 valent 06/24/2015     Tdap (Adacel,Boostrix) 11/20/2015     Above immunizations pulled from [facility]. [facility] and facility records also  reconciled. Outstanding information sent to  to update [facility].  Future immunizations are not needed at this point as all recommended immunizations are up to date.     Current Outpatient Medications   Medication Sig Dispense Refill     Acetaminophen (TYLENOL PO) Take 650 mg by mouth 2 times daily Also TID PRN       AmLODIPine Besylate (NORVASC PO) Take 7.5 mg by mouth At Bedtime       ASPIRIN PO Take 81 mg by mouth daily       Blood Glucose Monitoring Suppl MISC 3 times daily (before meals)       Carvedilol (COREG PO) Take 25 mg by mouth 2 times daily (with meals)       Cranberry-Vitamin C-Inulin (UTI-STAT PO) Take by mouth daily       fluticasone (FLONASE) 50 MCG/ACT nasal spray Spray 1 spray into both nostrils At Bedtime       Furosemide (LASIX PO) Take 20 mg by mouth daily        GABAPENTIN PO Take 600 mg by mouth 2 times daily        Glycerin-Dimeth-Surfactants (ONE-STEP SKIN CARE LOTION) LOTN Externally apply topically daily       hypromellose (ARTIFICIAL TEARS) 0.5 % SOLN 1 drop 3 times daily as needed for dry eyes       insulin aspart (NOVOLOG FLEXPEN) 100 UNIT/ML injection Inject 5 Units Subcutaneous 3 times daily (with meals)       insulin aspart (NOVOLOG FLEXPEN) 100 UNIT/ML injection Inject Subcutaneous 3 times daily (with meals) Sliding Scale;   If Blood Sugar is 200 to 249, give 2 Units.  If Blood Sugar is 250 to 299, give 4 Units.  If Blood Sugar is 300 to 349, give 6 Units.  If Blood Sugar is 350 to 400, give 8 Units.  If Blood Sugar is greater than 400, give 10 Units.  If Blood Sugar is greater than 401, call MD.       Insulin Glargine (BASAGLAR KWIKPEN SC) Inject 8 Units Subcutaneous 2 times daily        LevETIRAcetam (KEPPRA PO) Take 500 mg by mouth 2 times daily       LISINOPRIL PO Take 20 mg by mouth daily        nitroglycerin (NITROSTAT) 0.4 MG SL tablet Place 1 tablet (0.4 mg) under the tongue every 5 minutes as needed 25 tablet      nystatin (MYCOSTATIN) 802618 UNIT/GM  "POWD Apply topically 2 times daily as needed        polyethylene glycol (MIRALAX/GLYCOLAX) powder Take 17 g by mouth daily as needed for constipation        QUEtiapine Fumarate (SEROQUEL PO) Take 25 mg by mouth 2 times daily        QUEtiapine Fumarate (SEROQUEL PO) Take 12.5 mg by mouth daily midday       sennosides (SENOKOT) 8.6 MG tablet Take 2 tablets by mouth 2 times daily        traZODone (DESYREL) 50 MG tablet Take 1 tablet (50 mg) by mouth At Bedtime 31 tablet 12     trolamine salicylate (ASPERCREME) 10 % cream Apply topically 2 times daily as needed for moderate pain (also BID)        Venlafaxine HCl (EFFEXOR XR PO) Take 150 mg by mouth At Bedtime          Case Management:  I have reviewed the facility/SNF care plan/MDS, including the falls risk, nutrition and pain screening. I also reviewed the current immunizations, and preventive care. .Future cancer screening is not clinically indicated secondary to age/goals of care Patient's desire to return to the community is not assessible due to cognitive impairment. Current Level of Care is appropriate.    Advance Directive Discussion:    I reviewed the current advanced directives as reflected in EPIC, the POLST and the facility chart, and verified the congruency of orders.     Team Discussion:  I communicated with the appropriate disciplines involved with the Plan of Care:   Nursing    Patient's goal is pain control and comfort.  Information reviewed:  Medications, vital signs, orders, and nursing notes.    ROS:  Limited secondary to cognitive impairment but today pt reports her pain is \"better,\" denies SOB, CP (may be poor historian)    Vitals:  /56   Pulse 92   Temp 96.6  F (35.9  C)   Resp 14   Ht 1.626 m (5' 4\")   Wt 92.1 kg (203 lb)   SpO2 93%   BMI 34.84 kg/m   Body mass index is 34.84 kg/m .  Exam:  GENERAL APPEARANCE:  Alert, in no distress, confused  ENT:  Mouth and posterior oropharynx at baseline with very poor dentition, moist mucous " membranes, hearing acuity [place]  EYES:  EOM, conjunctivae, lids, pupils and irises normal  NECK:  No adenopathy,masses or thyromegaly  RESP:  respiratory effort and palpation of chest normal, no respiratory distress, Lung sounds clear, on 2 PM O2 via NC  CV:  Palpation and auscultation of heart done , rate and rhythm regular, no  murmur, no rub or gallop, Edema none, PVD changes to LEs  ABDOMEN:  Obese, normal bowel sounds, soft, nontender, MAJOR for hepatosplenomegaly or other masses d/t body habitus and clothed/seated assessment  M/S:   Gait and station with w/c for mobility, Digits and nails with arthritic changes  SKIN:  Inspection/Palpation of skin and subcutaneous tissue pale, intact, dry  NEURO: 2-12 in normal limits and at patient's baseline  PSYCH:  insight and judgement, memory with severe impairment , affect and mood normal     Lab/Diagnostic data:   Recent labs in [facility] reviewed by me today.     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  For this reason we will keep sliding scale insulin.  Improved blood glucose management with twice daily dosing of beta-blocker so will keep twice daily despite small units at each administration.  Can we order A1c for monitoring  We will continue to monitor blood glucose levels and DC sliding.  If appropriate as knowing this would be ideal for patient.  Goal: HgbA1C between 8-9%. Per AGS there is potential harm in lowering the A1C to <6.5% in older adults with diabetes.     CKD (chronic kidney disease) stage 3, GFR 30-59 ml/min (H)  Progressive worsening but stable at this time.    Weights up which may be nutritional vs fluid as fluid status difficult to ascertain by physical exam on this patient.  Possible congestion reason for progressive decline; advancing age/health another reason.     Chronic obstructive pulmonary disease, unspecified COPD type  (H)  Stable without controller medications at this time as patient has clear LS and no cough.  She remains bypoxic without O2, however, which may be related to CHF - see below.   Afebrile  No signs of exacerbation.     Morbid obesity (H)  Weights up which may be nutritional vs fluid/CHF.   Recommend healthy diet with portion control.   Patient is on DM diet.     Atherosclerotic peripheral vascular disease with intermittent claudication (H)  Gabapentin titrated down for renal dosing.   Patient continues with pain; today denies pain in legs but had pain with light palpation of ankles.   continue ASA 81 mg daily. OK to not start statin d/t advanced age, polypharmacy, goals of care.     Chronic atrial fibrillation (H)  Stable on carvedilol for rate control and ASA only for OA as patient is high falls risk and was taken off Coumadin for CVA bleed.    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  Weights increased which may be nutritional vs fluid.  CHF exacerbation a few months ago without ability to wean off O2.  LS clear but patient remains hypoxic without O2.  When extra diuresis was applied, CKD showed stress.  Patient appears euvolemic today however difficult to ascertain completely from exam d/t body habitus.   BP goals are  <140/90 mm Hg.This is higher than ACC and AHA recommendations due to goals of care, risk for hypotension, risk of dizziness and falls, risk of tissue/cerebral hypoperfusion and frailty. Patient is stable with current plan of care and routine assessment.      Hx of Nontraumatic hemorrhage of cerebral hemisphere, unspecified laterality  (H)  Convulsions, unspecified convulsion type (H)  Stable with ASA, levetiracetam despite no further seizures since initial CVA in 2010.   Noted no residual effects except word-finding difficulty which may be more related to dementia progression.    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  Patient failed on clonazepam and was lethargic.    Nursing for supportive cares    Gastroesophageal reflux disease, esophagitis presence not specified  stable without medications  monitor    Pain  Stable on regimen.  If increased pain, may anticipate increase in Tylenol for assistance with pain control.      Anemia due to stage 3 chronic kidney disease (H)  Unknown reason why drop in Hgb.    Can assess labs for monitoring. If folate and vitamin B12 are normal, may anticipate ordering Fe studies.   No noted bleeding or VS instability   As Hgb just below 10, will not start Fe at this time and can monitor for lab results.     Slow transit constipation  Stable on current regimen  Continue     Orders written by provider at facility, transcribed by :  1. 3/11/19: folate level, Vitamin B12 lvel, HgbA1C Dx: anemia, DM2    Electronically signed by:  LORI Ricks CNP

## 2019-03-08 NOTE — LETTER
3/8/2019        RE: Jayne Vasquez  [address] [address] [place] [id number]        [facility]  Chief Complaint   Patient presents with     Annual Comprehensive Nursing Home     [facility] Record Number:  [id number]  Place of Service where encounter took place:  [facility] [[id number]]    HPI:    Jayne Vasquez  is a 88 year old  (9/21/1930), who is being seen today for an annual comprehensive visit. HPI information obtained from: facility chart records, facility staff, patient report and [facility] chart review.  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)  Lab Results   Component Value Date    A1C 7.0 11/02/2018    A1C 5.9 05/25/2018    A1C 7.7 12/27/2017    A1C 7.6 07/31/2017    A1C 7.5 04/26/2017     Metformin DC'd in 2017  Patient on basal glargine 8 units twice daily, NovoLog 5 units 3 times daily before meals and sliding scale insulin    Blood glucose monitoring:  AMs: 138-220 (0-2 units sliding scale insulin)  Lunch: 189-280 (2-4 units sliding scale insulin)  Dinner: 239-326 (2-6 sliding scale insulin)    Patient has history of neuropathy in her feet - on Gabapentin 600 mg BID (weaned down for CKD)    CKD (chronic kidney disease) stage 3, GFR 30-59 ml/min (H)  BL seemingly now around 1.5-1.7 (up from past)  Cardio-renal  Last few BMPs:   12/24/19: BUN 36, Creat 1.62, GFR 28  1/18/19: BUN 36, Creat 1.57, GFR 29  3/1/19: BUN 31, Creat 1.56, GFR 29    Patient is on Lasix, lisinopril  Patient is on ASA, no statin d/t advanced age    Chronic obstructive pulmonary disease, unspecified COPD type (H)  On no controller meds  On fluticasone 50 mcg/act q HS both cares for rhinitis  Now on continuous O2 - 2 LPM (since last CHF exacerbation)  Sats 90-92%   Patient denies SOB (may be poor historian d/t cognitive impairment)    Morbid obesity (H)  Body mass index is 34.84 kg/m .  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)  Hyperlipidemia LDL goal <100  Old myocardial infarction  CAD s/p CABG 2012  Mitral valve insufficiency, unspecified etiology  Aortic stenosis, mild - per Echo  6/17/15 ECHO with EF 60-65% with LVH  On amlodipine 7.5 mg nightly, ASA 81 mg daily, carvedilol 25 mg twice daily, Lasix 20 mg every morning, lisinopril 20 mg daily, nitroglycerin as needed    BPs variable: 110-130s/50-60s  HRs 80-90s  Patient unable to comment effectively on symptoms of CP, SOB, HA, lightheadedness d/t cognitive impairment    Weights fluctuate:  2/5/19 - 200  2/12/19 - 204  2/23/19 - 200.4  2/26/19 - 198.6  2/28/19 - 200.4  3/5/19 - 203    Since last CHF exacerbation; patient unable to get weaned from O2 despite seemingly being euvolemic.     Hx of Nontraumatic hemorrhage of cerebral hemisphere, unspecified laterality (H)  Convulsions, unspecified convulsion type (H)  History of CVA in 2010 after a fall at home with seizures post CVA  On ASA 81 mg daily, levetiracetam 500 mg BID  Patient has word-finding troubles (unkown if this is from progressive dementia or post-CVA).        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   Patient on Effexor  mg nightly, (levetiracetam 500 mg BID for seizure ppx), Seroquel 25 mg twice daily in a.m. and p.m. and 12.5 mg midday  Patient is also on BuSpar but unfortunately this has been on hold due to inability to obtain from pharmacy/.  Patient was started on clonazepam for behaviors when buspar stopped - then was very lethargic so clonazepam was stopped.      Patient not easily redirectable  A&O x 1 (self/family)  Assist of 1 with ADLs, grooming  Patient at high fall risk due to not remembering to use call light and self transfers    PHQ9 - 3/27  BIMS 2/15    Gastroesophageal reflux disease, esophagitis presence not specified  On no medication  Patient unable to effectively comment on symptoms of heartburn or upset stomach    Pain  Patient on Tylenol 650 mg twice daily and 3 times daily as needed  Patient is a poor historian due to cognitive impairment but today reports her pain is \"better\"  Patient expresses pain with palpation of ankles (has history of neuropathy and PVD)    Anemia due to stage 3 chronic kidney disease (H)  Hemoglobin   Date Value Ref Range Status   03/01/2019 9.9 (L) 11.7 - 15.7 g/dL Final   01/08/2019 9.4 (L) 11.7 - 15.7 g/dL Final     9/7/18 - 12.1  No bleeding noted. Nursing denies any black, tarry or red stools  Last Fe studies - 2015  No folate or Vit B12 level on file.   Patient does have CKD3-4    Slow transit constipation  On MiraLAX 17 g daily as needed, senna 2 tabs twice daily  Nursing and E HR noting regular BMs         ALLERGIES: Dye [contrast dye]; Fluoxetine; Iodine-131; Methocarbamol; Paroxetine; and Penicillins  PAST MEDICAL HISTORY:  has a past medical history of Anemia, unspecified (11/04/10), Aphasia (02/12/10), Cataracts, Cerebral embolism with cerebral " infarction (H) (02/16/10), Chest pain (11/04/10), Confusion (01/30/10), Congestive heart failure, unspecified, Coronary atherosclerosis of unspecified type of vessel, native or graft (05/20/08), Diabetes mellitus (H), Diabetic eye exam (H) (3/26/15), Diabetic infection of left foot (2/26/2013), Diastolic dysfunction, left ventricle, grade I by Echo (4/2/2012), GERD (gastroesophageal reflux disease) (11/04/10), GI bleed, History of blood transfusion, History of recurrent UTIs, Hyperlipemia (4.22.11), Hypertension, Hyponatremia, Intermediate coronary syndrome (H), Labral tear of long head of biceps tendon, LVH (left ventricular hypertrophy) due to hypertensive disease - mild-moderate (4/2/2012), Open wound of left foot in 4th interdigital space (2/26/2013), Osteoarthrosis, shoulder region (07/19/09), Rotator cuff tear, Seizure disorder, secondary (H) (02/16/10), Subacromial bursitis, Transient cerebral ischemia (3/30/2012), Unspecified cerebral artery occlusion with cerebral infarction, and Vitamin D deficiency (02/08/10). She also has no past medical history of Asthma, Malignant neoplasm (H), or Thyroid disease.  PAST SURGICAL HISTORY:  has a past surgical history that includes appendectomy; Cholecystectomy; Hysterectomy; surgical history of - ; colonoscopy; cataract iol, rt/lt; surgical history of - ; surgical history of -  (5/2008); surgical history of - ; surgical history of - ; colonoscopy (5/31/11); surgical history of -  (5/31/11); cardiac catherization (05/20/08); Esophagoscopy, gastroscopy, duodenoscopy (EGD), combined (4/5/2012); Bypass graft artery coronary (4/9/2012); and Phacoemulsification with standard intraocular lens implant (Right, 4/16/2015).  IMMUNIZATIONS:  Immunization History   Administered Date(s) Administered     Influenza (High Dose) 3 valent vaccine 10/07/2011, 11/20/2015     Influenza (IIV3) PF 09/25/2012, 10/01/2013, 10/01/2014     Pneumococcal 23 valent 06/24/2015     Tdap  (Adacel,Boostrix) 11/20/2015     Above immunizations pulled from [facility]. [facility] and facility records also reconciled. Outstanding information sent to  to update [facility].  Future immunizations are not needed at this point as all recommended immunizations are up to date.     Current Outpatient Medications   Medication Sig Dispense Refill     Acetaminophen (TYLENOL PO) Take 650 mg by mouth 2 times daily Also TID PRN       AmLODIPine Besylate (NORVASC PO) Take 7.5 mg by mouth At Bedtime       ASPIRIN PO Take 81 mg by mouth daily       Blood Glucose Monitoring Suppl MISC 3 times daily (before meals)       Carvedilol (COREG PO) Take 25 mg by mouth 2 times daily (with meals)       Cranberry-Vitamin C-Inulin (UTI-STAT PO) Take by mouth daily       fluticasone (FLONASE) 50 MCG/ACT nasal spray Spray 1 spray into both nostrils At Bedtime       Furosemide (LASIX PO) Take 20 mg by mouth daily        GABAPENTIN PO Take 600 mg by mouth 2 times daily        Glycerin-Dimeth-Surfactants (ONE-STEP SKIN CARE LOTION) LOTN Externally apply topically daily       hypromellose (ARTIFICIAL TEARS) 0.5 % SOLN 1 drop 3 times daily as needed for dry eyes       insulin aspart (NOVOLOG FLEXPEN) 100 UNIT/ML injection Inject 5 Units Subcutaneous 3 times daily (with meals)       insulin aspart (NOVOLOG FLEXPEN) 100 UNIT/ML injection Inject Subcutaneous 3 times daily (with meals) Sliding Scale;   If Blood Sugar is 200 to 249, give 2 Units.  If Blood Sugar is 250 to 299, give 4 Units.  If Blood Sugar is 300 to 349, give 6 Units.  If Blood Sugar is 350 to 400, give 8 Units.  If Blood Sugar is greater than 400, give 10 Units.  If Blood Sugar is greater than 401, call MD.       Insulin Glargine (BASAGLAR KWIKPEN SC) Inject 8 Units Subcutaneous 2 times daily        LevETIRAcetam (KEPPRA PO) Take 500 mg by mouth 2 times daily       LISINOPRIL PO Take 20 mg by mouth daily        nitroglycerin (NITROSTAT) 0.4 MG SL tablet Place 1  "tablet (0.4 mg) under the tongue every 5 minutes as needed 25 tablet      nystatin (MYCOSTATIN) 040801 UNIT/GM POWD Apply topically 2 times daily as needed        polyethylene glycol (MIRALAX/GLYCOLAX) powder Take 17 g by mouth daily as needed for constipation        QUEtiapine Fumarate (SEROQUEL PO) Take 25 mg by mouth 2 times daily        QUEtiapine Fumarate (SEROQUEL PO) Take 12.5 mg by mouth daily midday       sennosides (SENOKOT) 8.6 MG tablet Take 2 tablets by mouth 2 times daily        traZODone (DESYREL) 50 MG tablet Take 1 tablet (50 mg) by mouth At Bedtime 31 tablet 12     trolamine salicylate (ASPERCREME) 10 % cream Apply topically 2 times daily as needed for moderate pain (also BID)        Venlafaxine HCl (EFFEXOR XR PO) Take 150 mg by mouth At Bedtime          Case Management:  I have reviewed the facility/SNF care plan/MDS, including the falls risk, nutrition and pain screening. I also reviewed the current immunizations, and preventive care. .Future cancer screening is not clinically indicated secondary to age/goals of care Patient's desire to return to the community is not assessible due to cognitive impairment. Current Level of Care is appropriate.    Advance Directive Discussion:    I reviewed the current advanced directives as reflected in EPIC, the POLST and the facility chart, and verified the congruency of orders.     Team Discussion:  I communicated with the appropriate disciplines involved with the Plan of Care:   Nursing    Patient's goal is pain control and comfort.  Information reviewed:  Medications, vital signs, orders, and nursing notes.    ROS:  Limited secondary to cognitive impairment but today pt reports her pain is \"better,\" denies SOB, CP (may be poor historian)    Vitals:  /56   Pulse 92   Temp 96.6  F (35.9  C)   Resp 14   Ht 1.626 m (5' 4\")   Wt 92.1 kg (203 lb)   SpO2 93%   BMI 34.84 kg/m    Body mass index is 34.84 kg/m .  Exam:  GENERAL APPEARANCE:  Alert, in no " distress, confused  ENT:  Mouth and posterior oropharynx at baseline with very poor dentition, moist mucous membranes, hearing acuity [place]  EYES:  EOM, conjunctivae, lids, pupils and irises normal  NECK:  No adenopathy,masses or thyromegaly  RESP:  respiratory effort and palpation of chest normal, no respiratory distress, Lung sounds clear, on 2 PM O2 via NC  CV:  Palpation and auscultation of heart done , rate and rhythm regular, no  murmur, no rub or gallop, Edema none, PVD changes to LEs  ABDOMEN:  Obese, normal bowel sounds, soft, nontender, MAJOR for hepatosplenomegaly or other masses d/t body habitus and clothed/seated assessment  M/S:   Gait and station with w/c for mobility, Digits and nails with arthritic changes  SKIN:  Inspection/Palpation of skin and subcutaneous tissue pale, intact, dry  NEURO: 2-12 in normal limits and at patient's baseline  PSYCH:  insight and judgement, memory with severe impairment , affect and mood normal     Lab/Diagnostic data:   Recent labs in [facility] reviewed by me today.     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  For this reason we will keep sliding scale insulin.  Improved blood glucose management with twice daily dosing of beta-blocker so will keep twice daily despite small units at each administration.  Can we order A1c for monitoring  We will continue to monitor blood glucose levels and DC sliding.  If appropriate as knowing this would be ideal for patient.  Goal: HgbA1C between 8-9%. Per AGS there is potential harm in lowering the A1C to <6.5% in older adults with diabetes.     CKD (chronic kidney disease) stage 3, GFR 30-59 ml/min (H)  Progressive worsening but stable at this time.    Weights up which may be nutritional vs fluid as fluid status difficult to ascertain by physical exam on this patient.  Possible congestion reason for progressive  decline; advancing age/health another reason.     Chronic obstructive pulmonary disease, unspecified COPD type (H)  Stable without controller medications at this time as patient has clear LS and no cough.  She remains bypoxic without O2, however, which may be related to CHF - see below.   Afebrile  No signs of exacerbation.     Morbid obesity (H)  Weights up which may be nutritional vs fluid/CHF.   Recommend healthy diet with portion control.   Patient is on DM diet.     Atherosclerotic peripheral vascular disease with intermittent claudication (H)  Gabapentin titrated down for renal dosing.   Patient continues with pain; today denies pain in legs but had pain with light palpation of ankles.   continue ASA 81 mg daily. OK to not start statin d/t advanced age, polypharmacy, goals of care.     Chronic atrial fibrillation (H)  Stable on carvedilol for rate control and ASA only for OA as patient is high falls risk and was taken off Coumadin for CVA bleed.    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  Weights increased which may be nutritional vs fluid.  CHF exacerbation a few months ago without ability to wean off O2.  LS clear but patient remains hypoxic without O2.  When extra diuresis was applied, CKD showed stress.  Patient appears euvolemic today however difficult to ascertain completely from exam d/t body habitus.   BP goals are  <140/90 mm Hg.This is higher than ACC and AHA recommendations due to goals of care, risk for hypotension, risk of dizziness and falls, risk of tissue/cerebral hypoperfusion and frailty. Patient is stable with current plan of care and  routine assessment.      Hx of Nontraumatic hemorrhage of cerebral hemisphere, unspecified laterality (H)  Convulsions, unspecified convulsion type (H)  Stable with ASA, levetiracetam despite no further seizures since initial CVA in 2010.   Noted no residual effects except word-finding difficulty which may be more related to dementia progression.    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  Patient failed on clonazepam and was lethargic.    Nursing for supportive cares    Gastroesophageal reflux disease, esophagitis presence not specified  stable without medications  monitor    Pain  Stable on regimen.  If increased pain, may anticipate increase in Tylenol for assistance with pain control.      Anemia due to stage 3 chronic kidney disease (H)  Unknown reason why drop in Hgb.    Can assess labs for monitoring. If folate and vitamin B12 are normal, may anticipate ordering Fe studies.   No noted bleeding or VS instability   As Hgb just below 10, will not start Fe at this time and can monitor for lab results.     Slow transit constipation  Stable on current regimen  Continue     Orders written by provider at facility, transcribed by :  1. 3/11/19: folate level, Vitamin B12 lvel, HgbA1C Dx: anemia, DM2    Electronically signed by:  LORI Ricks CNP             Sincerely,        LORI Ricks CNP

## 2019-04-02 PROBLEM — N18.9 ACUTE KIDNEY INJURY SUPERIMPOSED ON CKD (H): Status: ACTIVE | Noted: 2019-01-01

## 2019-04-02 PROBLEM — J96.21 ACUTE ON CHRONIC RESPIRATORY FAILURE WITH HYPOXIA AND HYPERCAPNIA (H): Status: ACTIVE | Noted: 2019-01-01

## 2019-04-02 PROBLEM — J96.22 ACUTE ON CHRONIC RESPIRATORY FAILURE WITH HYPOXIA AND HYPERCAPNIA (H): Status: ACTIVE | Noted: 2019-01-01

## 2019-04-02 PROBLEM — N17.9 ACUTE KIDNEY INJURY SUPERIMPOSED ON CKD (H): Status: ACTIVE | Noted: 2019-01-01

## 2019-04-02 PROBLEM — J18.9 PNEUMONIA: Status: ACTIVE | Noted: 2019-01-01

## 2019-04-02 NOTE — H&P
[facility]    History and Physical  Hospitalist       Date of Admission:  4/2/2019    Assessment & Plan   Principal Problem:    Acute exacerbation of CHF (congestive heart failure) (H)    Assessment: BNP is >17969, crackles on all over the lung. Hx of chf, hx of CAD,     Plan: will put on lasix 40 mg q8h for 4x. Daily weight. 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 HOLD lisinopril due acute on chronic kidney failure      Dementia    Assessment: stable    Plan: monitor only      Pneumonia    Assessment: was recently diagnosed with pneumonia. Currently on ceftriaxone in nursing home    Plan: continue ceftriaxone. Add azithromycin, will check procalcitonin. If procalcitonin normal and blood culture negative, consider stop antibiotic      Acute on chronic respiratory failure with hypoxia and hypercapnia (H)    Assessment: due to combination of possible pneumonia and chf    Plan: management as above, will continue bipap for now       Acute kidney injury superimposed on CKD (H)    Assessment: creatinine is 2.2, baseline is 1.5    Plan: seem to be more due to lack of flow than lack of fluid. Will diurese for now. Monitor       Hyperlipidemia LDL goal <100    Assessment: on no statin    Plan: monitor only      COPD (chronic obstructive pulmonary disease) (H)    Assessment: on no nebs at this time.     Plan: will put on prn nebs, continue bipap as above      CAD s/p CABG 2012; angio 2008 - occluded RCA and RCA stent, distal circ occlusion with open prox-mid circ stent, 30% D2 stenosis    Assessment: on aspirin.  "Coreg,     Plan: continue the same      Elevated troponin level     Assessment: initial troponin is 0.112, denies chest pain, EKG show no ST elevation     Plan: will trend troponin. Possible from cardiac strain and combination of CKD.       Seizure disorder (H)    Assessment: on keppra    Plan: continue keppra          Jayne Vasquez is a 88 year old female who presents with shortness of breath. Recently diagnosed with pneumonia 2 days ago. Was started on ceftriaxone. Apparently caregiver today noted that patient seem to be struggle more with breathing. She listen to her lung and think her lung is sound \"wet\" due to that she called EMS and patient was brought here. It was noted initially her oxygen sat was 70% in room air. Pt is chronically use oxigen. Troponin was noted mildly elevated and also bnp was >50498. Pt has hx of CHF, pt also noted to have acute on chronic CKD. Pt was put on bipap in the ER and breathing better with the bipap      # Pain Assessment:  Current Pain Score 6/17/2015   Pain score (0-10) 0   Pain location -   Pain descriptors -   Jayne sánchez pain level was assessed and she currently denies pain.      DVT Prophylaxis: Heparin SQ  Code Status: DNR / DNI    Disposition: Expected discharge in 3-5 days once breathing improved.    Luis Enrique Morris    Primary Care Physician   Lisa Lopez    Chief Complaint   Shortness of breath.     History is obtained from the patient and patient's son    History of Present Illness   Jayne Vasquez is a 88 year old female who presents with shortness of breath. Recently diagnosed with pneumonia 2 days ago. Was started on ceftriaxone. Apparently caregiver today noted that patient seem to be struggle more with breathing. She listen to her lung and think her lung is sound \"wet\" due to that she called EMS and patient was brought here. It was noted initially her oxygen sat was 70% in room air. Pt is chronically use oxigen. Troponin was noted mildly elevated and also " bnp was >96955. Pt has hx of CHF, pt also noted to have acute on chronic CKD. Pt was put on bipap in the ER and breathing better with the bipap    Past Medical History    I have reviewed this patient's medical history and updated it with pertinent information if needed.   Past Medical History:   Diagnosis Date     Anemia, unspecified 11/04/10     Aphasia 02/12/10     Cataracts      Cerebral embolism with cerebral infarction (H) 02/16/10     Chest pain 11/04/10     Confusion 01/30/10     Congestive heart failure, unspecified      Coronary atherosclerosis of unspecified type of vessel, native or graft 05/20/08    D/C 05/23/08; 02/12/10thru 02/21/10; 11/06/10 thru 11/09/10     Diabetes mellitus (H)     Type 2 for 40 years     Diabetic eye exam (H) 3/26/15     Diabetic infection of left foot 2/26/2013     Diastolic dysfunction, left ventricle, grade I by Echo 4/2/2012     GERD (gastroesophageal reflux disease) 11/04/10     GI bleed      History of blood transfusion      History of recurrent UTIs      Hyperlipemia 4.22.11     Hypertension      Hyponatremia      Intermediate coronary syndrome (H)      Labral tear of long head of biceps tendon      LVH (left ventricular hypertrophy) due to hypertensive disease - mild-moderate 4/2/2012     Open wound of left foot in 4th interdigital space 2/26/2013     Osteoarthrosis, shoulder region 07/19/09     Rotator cuff tear      Seizure disorder, secondary (H) 02/16/10     Subacromial bursitis      Transient cerebral ischemia 3/30/2012     Diagnosis updated by automated process. Provider to review and confirm.     Unspecified cerebral artery occlusion with cerebral infarction      Vitamin D deficiency 02/08/10       Past Surgical History   I have reviewed this patient's surgical history and updated it with pertinent information if needed.  Past Surgical History:   Procedure Laterality Date     APPENDECTOMY       BYPASS GRAFT ARTERY CORONARY  4/9/2012    Procedure:BYPASS GRAFT ARTERY  CORONARY; CORONARY ARTERY BYPASS x3 (LAD, PDA, OM) WITH ENDOSCOPIC VEIN HARVEST (ON PUMP/WARM & BEATING) ; Surgeon:JESUS SEPULVEDA; Location: OR     CARDIAC CATHERIZATION  05/20/08    [facility]     CATARACT IOL, RT/LT      Cataract Removal      CHOLECYSTECTOMY       COLONOSCOPY      approx 2001, normal      COLONOSCOPY  5/31/11    Colonoscopy Diagnostic, benign rectal polyp      ESOPHAGOSCOPY, GASTROSCOPY, DUODENOSCOPY (EGD), COMBINED  4/5/2012    Procedure:COMBINED ESOPHAGOSCOPY, GASTROSCOPY, DUODENOSCOPY (EGD); gastroscopy; Surgeon:RIC ARANDA; Location: GI     HYSTERECTOMY       PHACOEMULSIFICATION WITH STANDARD INTRAOCULAR LENS IMPLANT Right 4/16/2015    Procedure: PHACOEMULSIFICATION WITH STANDARD INTRAOCULAR LENS IMPLANT;  Surgeon: Fer Love MD;  Location:  OR     SURGICAL HISTORY OF -       retinal laser surgery     SURGICAL HISTORY OF -       Orbit surgery, retinal      SURGICAL HISTORY OF -   5/2008    angiogram      SURGICAL HISTORY OF -       Coronary stents- rca, cfx      SURGICAL HISTORY OF -       Foot fx, right      SURGICAL HISTORY OF -   5/31/11    Esophagogastroduodenoscopy, gastric polyps       Prior to Admission Medications   Prior to Admission Medications   Prescriptions Last Dose Informant Patient Reported? Taking?   ASPIRIN PO   Yes No   Sig: Take 81 mg by mouth daily   Acetaminophen (TYLENOL PO)   Yes No   Sig: Take 650 mg by mouth 2 times daily Also TID PRN   AmLODIPine Besylate (NORVASC PO)   Yes No   Sig: Take 7.5 mg by mouth At Bedtime   Blood Glucose Monitoring Suppl MISC   Yes No   Sig: 3 times daily (before meals)   Carvedilol (COREG PO)   Yes No   Sig: Take 25 mg by mouth 2 times daily (with meals)   Cranberry-Vitamin C-Inulin (UTI-STAT PO)   Yes No   Sig: Take by mouth daily   Furosemide (LASIX PO)   Yes No   Sig: Take 20 mg by mouth daily    GABAPENTIN PO   Yes No   Sig: Take 600 mg by mouth 2 times daily    Glycerin-Dimeth-Surfactants (ONE-STEP SKIN CARE  LOTION) LOTN   Yes No   Sig: Externally apply topically daily   Insulin Glargine (BASAGLAR KWIKPEN SC)   Yes No   Sig: Inject 8 Units Subcutaneous 2 times daily    LISINOPRIL PO   Yes No   Sig: Take 20 mg by mouth daily    LevETIRAcetam (KEPPRA PO)   Yes No   Sig: Take 500 mg by mouth 2 times daily   QUEtiapine Fumarate (SEROQUEL PO)   Yes No   Sig: Take 12.5 mg by mouth daily midday   QUEtiapine Fumarate (SEROQUEL PO)   Yes No   Sig: Take 25 mg by mouth 2 times daily    Venlafaxine HCl (EFFEXOR XR PO)   Yes No   Sig: Take 150 mg by mouth At Bedtime    fluticasone (FLONASE) 50 MCG/ACT nasal spray   Yes No   Sig: Spray 1 spray into both nostrils At Bedtime   hypromellose (ARTIFICIAL TEARS) 0.5 % SOLN   Yes No   Si drop 3 times daily as needed for dry eyes   insulin aspart (NOVOLOG FLEXPEN) 100 UNIT/ML injection   Yes No   Sig: Inject Subcutaneous 3 times daily (with meals) Sliding Scale;   If Blood Sugar is 200 to 249, give 2 Units.  If Blood Sugar is 250 to 299, give 4 Units.  If Blood Sugar is 300 to 349, give 6 Units.  If Blood Sugar is 350 to 400, give 8 Units.  If Blood Sugar is greater than 400, give 10 Units.  If Blood Sugar is greater than 401, call MD.   insulin aspart (NOVOLOG FLEXPEN) 100 UNIT/ML injection   Yes No   Sig: Inject 5 Units Subcutaneous 3 times daily (with meals)   nitroglycerin (NITROSTAT) 0.4 MG SL tablet   No No   Sig: Place 1 tablet (0.4 mg) under the tongue every 5 minutes as needed   nystatin (MYCOSTATIN) 805393 UNIT/GM POWD   Yes No   Sig: Apply topically 2 times daily as needed    polyethylene glycol (MIRALAX/GLYCOLAX) powder   Yes No   Sig: Take 17 g by mouth daily as needed for constipation    sennosides (SENOKOT) 8.6 MG tablet   Yes No   Sig: Take 2 tablets by mouth 2 times daily    traZODone (DESYREL) 50 MG tablet   No No   Sig: Take 1 tablet (50 mg) by mouth At Bedtime   trolamine salicylate (ASPERCREME) 10 % cream   Yes No   Sig: Apply topically 2 times daily as needed for  moderate pain (also BID)       Facility-Administered Medications: None     Allergies   Allergies   Allergen Reactions     Dye [Contrast Dye] Difficulty breathing     Note: tolerated contrast for angiogram on 6/28/13 without side effects; was pre-medicated with steroid/Benadryl.      Fluoxetine      nightmares     Iodine-131      Iodine containing      Methocarbamol      Paroxetine      nightmares     Penicillins Itching       Social History   I have reviewed this patient's social history and updated it with pertinent information if needed. Jayne Vasquez  reports that  has never smoked. she has never used smokeless tobacco. She reports that she does not drink alcohol or use drugs.    Family History   I have reviewed this patient's family history and updated it with pertinent information if needed.   Family History   Problem Relation Age of Onset     C.A.D. Mother      C.A.D. Father      Cerebrovascular Disease Sister      Cancer Sister         lung     C.A.D. Son      Neurologic Disorder Son         M.S.      Cancer Mother         unknown type by patient, diagnosed on autopsy.        Review of Systems   The 10 point Review of Systems is negative other than noted in the HPI or here.     Physical Exam       BP: (!) 121/94   Heart Rate: 90 Resp: 18 SpO2: 93 % O2 Device: BiPAP/CPAP    Vital Signs with Ranges  Heart Rate:  [90] 90  Resp:  [18] 18  BP: (121)/(94) 121/94  FiO2 (%):  [100 %] 100 %  SpO2:  [78 %-93 %] 93 %  0 lbs 0 oz    Constitutional: alert, confused seem to be baseline, moderate to severe distress, on bipap  Eyes: PERRLA  HEENT: head normocephalic, ENT normal  Respiratory: crackles noted in all lung area  Cardiovascular: regular rate and rhythm  GI: supple. Non distended  Lymph/Hematologic: no lymphnode enlargement  Genitourinary: not examined  Skin: no rash or bruise  Musculoskeletal: bilateral leg edema noted   Neurologic: awake. Confused but as per son is baseline due to her dementia. Able to follow  simple command  Psychiatric: confused     Data     Data reviewed today:    Recent Results (from the past 168 hour(s))   Influenza A/B antigen    Collection Time: 03/29/19 10:58 AM   Result Value Ref Range    Influenza A/B Agn Specimen Nares     Influenza A Negative NEG^Negative    Influenza B Negative NEG^Negative   CBC with platelets differential    Collection Time: 04/02/19 12:46 AM   Result Value Ref Range    WBC 5.4 4.0 - 11.0 10e9/L    RBC Count 2.80 (L) 3.8 - 5.2 10e12/L    Hemoglobin 8.8 (L) 11.7 - 15.7 g/dL    Hematocrit 28.0 (L) 35.0 - 47.0 %     78 - 100 fl    MCH 31.4 26.5 - 33.0 pg    MCHC 31.4 (L) 31.5 - 36.5 g/dL    RDW 12.4 10.0 - 15.0 %    Platelet Count 103 (L) 150 - 450 10e9/L    Diff Method Automated Method     % Neutrophils 67.3 %    % Lymphocytes 21.4 %    % Monocytes 8.9 %    % Eosinophils 0.4 %    % Basophils 0.2 %    % Immature Granulocytes 1.8 %    Nucleated RBCs 0 0 /100    Absolute Neutrophil 3.7 1.6 - 8.3 10e9/L    Absolute Lymphocytes 1.2 0.8 - 5.3 10e9/L    Absolute Monocytes 0.5 0.0 - 1.3 10e9/L    Absolute Basophils 0.0 0.0 - 0.2 10e9/L    Abs Immature Granulocytes 0.1 0 - 0.4 10e9/L    Absolute Nucleated RBC 0.0    Comprehensive metabolic panel    Collection Time: 04/02/19 12:46 AM   Result Value Ref Range    Sodium 137 133 - 144 mmol/L    Potassium 4.3 3.4 - 5.3 mmol/L    Chloride 101 94 - 109 mmol/L    Carbon Dioxide 26 20 - 32 mmol/L    Anion Gap 10 3 - 14 mmol/L    Glucose 218 (H) 70 - 99 mg/dL    Urea Nitrogen 60 (H) 7 - 30 mg/dL    Creatinine 2.29 (H) 0.52 - 1.04 mg/dL    GFR Estimate 18 (L) >60 mL/min/[1.73_m2]    GFR Estimate If Black 21 (L) >60 mL/min/[1.73_m2]    Calcium 8.4 (L) 8.5 - 10.1 mg/dL    Bilirubin Total 0.4 0.2 - 1.3 mg/dL    Albumin 3.4 3.4 - 5.0 g/dL    Protein Total 8.2 6.8 - 8.8 g/dL    Alkaline Phosphatase 96 40 - 150 U/L    ALT 41 0 - 50 U/L    AST 39 0 - 45 U/L   Troponin I    Collection Time: 04/02/19 12:46 AM   Result Value Ref Range    Troponin I  ES 0.112 (H) 0.000 - 0.045 ug/L   Blood gas venous    Collection Time: 04/02/19 12:46 AM   Result Value Ref Range    Ph Venous 7.32 7.32 - 7.43 pH    PCO2 Venous 58 (H) 40 - 50 mm Hg    PO2 Venous 30 25 - 47 mm Hg    Bicarbonate Venous 30 (H) 21 - 28 mmol/L    Base Excess Venous 2.5 mmol/L    FIO2 75    Nt probnp inpatient (BNP)    Collection Time: 04/02/19 12:46 AM   Result Value Ref Range    N-Terminal Pro BNP Inpatient 15,344 (H) 0 - 1,800 pg/mL   Lactic acid whole blood    Collection Time: 04/02/19 12:46 AM   Result Value Ref Range    Lactic Acid 1.4 0.7 - 2.0 mmol/L      Recent Results (from the past 24 hour(s))   XR Chest Port 1 View    Narrative    XR CHEST PORT 1 VW  4/2/2019 1:27 AM     INDICATION: Shortness of breath.    COMPARISON: 6/21/2015.      Impression    IMPRESSION: Shallow inspiration. Mild perihilar interstitial opacities  could be edema or pneumonia. Sternotomy. Stable heart size near the  upper limits of normal.

## 2019-04-02 NOTE — PROGRESS NOTES
SPIRITUAL HEALTH SERVICES  SPIRITUAL ASSESSMENT Progress Note  [facility]      Initial Visit - (Pt known to  from previous hospitalization.)  Pt was on Bi-Pap and sedated.  Her son, two daughters, and a boyfriend of one daughter were present and providing emotional support to each other.  Son requested prayer, which  provided, along with compassionate presence.   is available for pt/family needs.    Manolo Katz M.Div., [facility]  Staff   Office tel: [phone]

## 2019-04-02 NOTE — ED TRIAGE NOTES
Pt presents by EMS from the [facility] in [place].  Pt was diagnosised with pneumonia on 3/30.  Tonight pt experienced increased shortness of breath.  At 2330 nursing staff checked on pt, state that pt's lungs sounded wet. Nursing staff at [facility] gave pt an Albuterol neb prior to EMS.

## 2019-04-02 NOTE — PROGRESS NOTES
[facility]    Medicine Progress Note - Hospitalist Service       Date of Admission:  4/2/2019  Assessment & Plan     Acute exacerbation of CHF (congestive heart failure), most likely diastolic in nature  -await cardiologist to read echo done today, last echo was on 2015 show EF of 60-65%  -reduce lasix from 40mg iv tid to daily due to rising Cr and lowish BP  -continue bipap   -DNR per son/POA    Demand ischemia due to above, hx of CAD, s/p CABG 2012; angio 2008 - occluded RCA and RCA stent, distal circ occlusion with open prox-mid circ stent, 30% D2 stenosis  -trend troponins  -empiric ASA+lopressor+NTG prn, can't add lipitor due to NPO status(RN noted choking with pills)    Bacterial pneumonia, presumed diagnosis, hx of COPD  Acute on chronic respiratory failure with hypoxia and hypercapnia (H), multifactorial  -low threshold to descalate antibiotics once fever is gone in light of normal WBC/lactate/procalcitonin, currently on rocephin and azithromycin empirically since admission     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)  -hold home lantus due to NPO status until speech can evaluate her for choking  -sliding scale insulin and low rate D5NS while NPO    Acute renal insufficiency /CKD3  -monitor lytes and renal function closely while on lasix and NPO status  -home lisinopril on hold due to ZABRINA and lowish BP     acute metabolic encephalopathy in the background of Dementia after hemorrhagic stroke a few years ago per family  -neuro check  -telemetry  -continuous oximetry  -correction of causes       Seizure disorder (H), stable   -change keppra from po to iv due to NPO status  -ativan prn  -seizure precaution  -consider EEG if altered mental status is prolonged      Diet: NPO for Medical/Clinical Reasons Except for: Meds    DVT Prophylaxis: SCD, cautious about adding heparin in light of ICH hx a few years ago  Prieto Catheter: in place, indication: Strict 1-2 Hour  I&O  Code Status: DNR/DNI      Disposition Plan   Expected discharge: 4 - 7 days, recommended to transitional care unit once antibiotic plan established.  Entered: Kamila Houston MD 04/02/2019, 1:15 PM       The patient's care was discussed with the Patient's Family.    Kamila Houston MD  Hospitalist Service  [facility]    ______________________________________________________________________    Interval History   More confused than her baseline,she had intermittent episodes of word finding difficulty ever since her intracranial bleed per family  BP lowish, put out 300cc urine after 40mg iv lasix last night  Chokes on meds with sips of water    Data reviewed today: I reviewed all medications, new labs and imaging results over the last 24 hours. I personally reviewed the chest x-ray image(s) showing IMPRESSION: Shallow inspiration. Mild perihilar interstitial opacities  could be edema or pneumonia. Sternotomy. Stable heart size near the  upper limits of normal.    Physical Exam   Vital Signs: Temp: 101.9  F (38.8  C) Temp src: Rectal BP: 106/68 Pulse: 92 Heart Rate: 92 Resp: 11 SpO2: 97 % O2 Device: BiPAP/CPAP Oxygen Delivery: 15 LPM  Weight: 210 lbs 1.57 oz  Constitutional: alert, confused seem to be baseline, moderate to severe distress, on bipap  Eyes: PERRLA  HEENT: head normocephalic, ENT normal  Respiratory: crackles noted in all lung area  Cardiovascular: regular rate and rhythm  GI: supple. Non distended  Lymph/Hematologic: no lymphnode enlargement  Genitourinary: not examined  Skin: no rash or bruise  Musculoskeletal: bilateral leg edema noted   Neurologic: awake. Confused but as per son is baseline due to her dementia. Able to follow simple command, nonverbal          Data   Recent Labs   Lab 04/02/19  1157 04/02/19  0530 04/02/19  0046   WBC  --  5.4 5.4   HGB  --  8.5* 8.8*   MCV  --  100 100   PLT  --  96* 103*   NA  --  139 137   POTASSIUM  --  4.7 4.3   CHLORIDE  --  103 101    CO2  --  26 26   BUN  --  59* 60*   CR  --  2.30* 2.29*   ANIONGAP  --  10 10   ELIZABETH  --  8.3* 8.4*   GLC  --  239* 218*   ALBUMIN  --   --  3.4   PROTTOTAL  --   --  8.2   BILITOTAL  --   --  0.4   ALKPHOS  --   --  96   ALT  --   --  41   AST  --   --  39   TROPI 0.130* 0.103* 0.112*

## 2019-04-02 NOTE — ED NOTES
ED Nursing criteria listed below was addressed during verbal handoff:     Abnormal vitals: Yes  Abnormal results: Yes  Med Reconciliation completed: Yes  Meds given in ED: Yes  Any Overdue Meds: Yes  Core Measures: Yes  Isolation: Yes  Special needs: Yes  Skin assessment: Yes    Observation Patient  Education provided: N/A

## 2019-04-02 NOTE — PROGRESS NOTES
"S-(situation): Patient arrives to room 218 via cart from ED    B-(background): Pt to ED with decrease LOC and \"wet sounding Lungs\"    A-(assessment): Pt is oriented to name only, she is picking at equipment, she is not redirectable, but pleasant.  LS are coarse and diminished, pt does not follow request for  Taking deep breaths.  Skin is intact.     R-(recommendations): Orders reviewed with patient as able given condition. . Will monitor patient per MD orders.     Inpatient nursing criteria listed below were met:    Health care directives status obtained and documented: Yes  SCD's Documented: No, Heparin to start.  Skin issues/needs documented:Yes, plaque appearing groin rash  Isolation needs addressed, if appropriate: Yes  Fall Prevention: Care plan updated, Education given and documented Yes, in education plan and care plan but patient is unable to participate in education at this time.   MRSA swab completed for patient 55 years and older (exclude TIANNA and TKA): Yes  Care Plan initiated: Yes  Education Assessment documented:No, pt is unable to participate at this time and family is not present.  Education Documented (Pre-existing chronic infection such as, MRSA/VRE need education on admission): NA  Admission Medication Reconciliation completed: Yes, done in ED with [facility] documentation  New medication patient education completed and documented (Possible Side Effects of Common Medications handout): Unable at this time due to patient condition.  Home medications if not able to send immediately home with family stored here: No  Reminder note placed in discharge instructions: NA  Discharge planning review completed (admission navigator) Yes, return to [facility]        "

## 2019-04-02 NOTE — ED PROVIDER NOTES
History     Chief Complaint   Patient presents with     Shortness of Breath     HPI  Jayne Vasquez is a 88 year old female who presents with shortness of breath from the nursing home.  Patient was recently diagnosed with a pneumonia about 3 or 4 days ago.  Patient has been treated with IV ceftriaxone daily according to the nursing home forms.  Tonight patient started complaining of increasing breathing problems and when nursing staff listen to her, she sounded very wet.  They tried a neb which did not help much so they called EMS and sent the patient here.  Patient was placed on BiPAP and stating that she is feeling a little bit better.  Patient currently denies any chest pain.  History is somewhat limited secondary to her dementia.  A lot a history was obtained from the son.  He states that the patient was then [facility] on Sunday when he last saw her.    Allergies:  Allergies   Allergen Reactions     Dye [Contrast Dye] Difficulty breathing     Note: tolerated contrast for angiogram on 6/28/13 without side effects; was pre-medicated with steroid/Benadryl.      Fluoxetine      nightmares     Iodine-131      Iodine containing      Methocarbamol      Paroxetine      nightmares     Penicillins Itching       Problem List:    Patient Active Problem List    Diagnosis Date Noted     Morbid obesity (H) 05/25/2017     Priority: Medium     Frail elderly 01/09/2017     Priority: Medium     Nontraumatic hemorrhage of cerebral hemisphere (H) 01/30/2016     Priority: Medium     Psychosis (H) 12/14/2015     Priority: Medium     Dementia 11/11/2015     Priority: Medium     Paranoia (H) 10/19/2015     Priority: Medium     Depression 10/19/2015     Priority: Medium     Confusion 09/28/2015     Priority: Medium     Back pain 09/11/2015     Priority: Medium     Anemia 08/10/2015     Priority: Medium     Health Care Home 07/27/2015     Priority: Medium     No longer active with [facility] community case management  effective 6/30/15.         Cerebral infarction (H) 07/02/2015     Priority: Medium     Diagnosis updated by automated process. Provider to review and confirm.       HTN, goal below 140/90 06/26/2015     Priority: Medium     Urinary frequency 06/26/2015     Priority: Medium     Intracerebral hemorrhage (H) 06/17/2015     Priority: Medium     Diagnosis updated by automated process. Provider to review and confirm.       Neck pain on right side 06/02/2015     Priority: Medium     Fracture of phalanx of finger 04/07/2015     Priority: Medium     Fall from bed 04/07/2015     Priority: Medium     Vascular dementia 10/25/2013     Priority: Medium     Bone infection (H) 07/01/2013     Priority: Medium     Nonhealing nonsurgical wound 06/25/2013     Priority: Medium     Orthostatic hypotension 06/24/2013     Priority: Medium     Type 2 diabetes, HbA1C goal < 8% (H) 05/13/2013     Priority: Medium     Advanced directives, counseling/discussion 04/25/2013     Priority: Medium     Advance Care Planning: discussed and given info in clinic.                Leukopenia 02/27/2013     Priority: Medium     Cellulitis 02/26/2013     Priority: Medium     Atherosclerotic peripheral vascular disease with intermittent claudication (H) 02/26/2013     Priority: Medium     Type 2 diabetes mellitus with manifestations - retinopathy, neuropathy, nephropathy 02/26/2013     Priority: Medium     Diabetic foot infection (H) 02/26/2013     Priority: Medium     Cystitis, chronic 12/19/2012     Priority: Medium     Yeast infection of the skin 12/04/2012     Priority: Medium     Moderate major depression (H) 12/04/2012     Priority: Medium     Neuropathy of lower extremity 11/02/2012     Priority: Medium     Overactive bladder 09/10/2012     Priority: Medium     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 Provider to review       LVH (left  ventricular hypertrophy) due to hypertensive disease - mild-moderate 04/02/2012     Priority: Medium     Diastolic dysfunction, left ventricle, grade I by Echo 04/02/2012     Priority: Medium     Headache 03/30/2012     Priority: Medium     Problem list name updated by automated process. Provider to review       Constipation 02/04/2012     Priority: Medium     Other and unspecified angina pectoris due to effects of htn heart disease 11/04/2011     Priority: Medium     ASCVD (arteriosclerotic cardiovascular disease)       Transient ischemic attack (TIA), and cerebral infarction without residual deficits(V12.54) 11/04/2011     Priority: Medium     Cerebral embolism with cerebral infarction       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) 11/04/2011     Priority: Medium     Problem list name updated by automated process. Provider to review       GERD (gastroesophageal reflux disease) 10/07/2011     Priority: Medium     Seizure disorder (H) 02/26/2013     Priority: Low     Postsurgical aortocoronary bypass status 05/10/2012     Priority: Low     Atrial fibrillation (H) 04/24/2012     Priority: Low     MR (mitral regurgitation) - mild on Echo 04/02/2012     Priority: Low     Aortic stenosis, mild - per Echo 04/02/2012     Priority: Low     Visual hallucinations 04/01/2012     Priority: Low     Diarrhea 03/30/2012     Priority: Low     CAD s/p CABG 2012; angio 2008 - occluded RCA and RCA stent, distal circ occlusion with open prox-mid circ stent, 30% D2 stenosis 03/30/2012     Priority: Low     Anemia of other chronic disease due to CKD 01/26/2012     Priority: Low     Thrombocytopenia (H) 12/22/2011     Priority: Low     Anxiety 12/13/2011     Priority: Low     COPD (chronic obstructive pulmonary disease) (H) 11/04/2011     Priority: Low     n addition to COPD also has Sleep apnea history with refusal to wear cpap.       Old myocardial infarction 11/04/2011     Priority: Low     CKD stage 3, due to  effects of DM II and HTN GFR 9/2012 51; 4/2012 59; 12/11 53; 10/2011 59 11/04/2011     Priority: Low     Convulsions (H) 11/04/2011     Priority: Low     Peripheral autonomic neuropathy in disorders classified elsewhere 11/04/2011     Priority: Low     Problem list name updated by automated process. Provider to review       Blindness, legal 11/04/2011     Priority: Low     Pulmonary HTN (H) 10/07/2011     Priority: Low     Hyperlipidemia LDL goal <100 10/07/2011     Priority: Low        Past Medical History:    Past Medical History:   Diagnosis Date     Anemia, unspecified 11/04/10     Aphasia 02/12/10     Cataracts      Cerebral embolism with cerebral infarction (H) 02/16/10     Chest pain 11/04/10     Confusion 01/30/10     Congestive heart failure, unspecified      Coronary atherosclerosis of unspecified type of vessel, native or graft 05/20/08     Diabetes mellitus (H)      Diabetic eye exam (H) 3/26/15     Diabetic infection of left foot 2/26/2013     Diastolic dysfunction, left ventricle, grade I by Echo 4/2/2012     GERD (gastroesophageal reflux disease) 11/04/10     GI bleed      History of blood transfusion      History of recurrent UTIs      Hyperlipemia 4.22.11     Hypertension      Hyponatremia      Intermediate coronary syndrome (H)      Labral tear of long head of biceps tendon      LVH (left ventricular hypertrophy) due to hypertensive disease - mild-moderate 4/2/2012     Open wound of left foot in 4th interdigital space 2/26/2013     Osteoarthrosis, shoulder region 07/19/09     Rotator cuff tear      Seizure disorder, secondary (H) 02/16/10     Subacromial bursitis      Transient cerebral ischemia 3/30/2012     Unspecified cerebral artery occlusion with cerebral infarction      Vitamin D deficiency 02/08/10       Past Surgical History:    Past Surgical History:   Procedure Laterality Date     APPENDECTOMY       BYPASS GRAFT ARTERY CORONARY  4/9/2012    Procedure:BYPASS GRAFT ARTERY CORONARY; CORONARY  ARTERY BYPASS x3 (LAD, PDA, OM) WITH ENDOSCOPIC VEIN HARVEST (ON PUMP/WARM & BEATING) ; Surgeon:JESUS SEPULVEDA; Location: OR     CARDIAC CATHERIZATION  05/20/08    [facility]     CATARACT IOL, RT/LT      Cataract Removal      CHOLECYSTECTOMY       COLONOSCOPY      approx 2001, normal      COLONOSCOPY  5/31/11    Colonoscopy Diagnostic, benign rectal polyp      ESOPHAGOSCOPY, GASTROSCOPY, DUODENOSCOPY (EGD), COMBINED  4/5/2012    Procedure:COMBINED ESOPHAGOSCOPY, GASTROSCOPY, DUODENOSCOPY (EGD); gastroscopy; Surgeon:RIC ARANDA; Location: GI     HYSTERECTOMY       PHACOEMULSIFICATION WITH STANDARD INTRAOCULAR LENS IMPLANT Right 4/16/2015    Procedure: PHACOEMULSIFICATION WITH STANDARD INTRAOCULAR LENS IMPLANT;  Surgeon: Fer Love MD;  Location:  OR     SURGICAL HISTORY OF -       retinal laser surgery     SURGICAL HISTORY OF -       Orbit surgery, retinal      SURGICAL HISTORY OF -   5/2008    angiogram      SURGICAL HISTORY OF -       Coronary stents- rca, cfx      SURGICAL HISTORY OF -       Foot fx, right      SURGICAL HISTORY OF -   5/31/11    Esophagogastroduodenoscopy, gastric polyps       Family History:    Family History   Problem Relation Age of Onset     C.A.D. Mother      C.A.D. Father      Cerebrovascular Disease Sister      Cancer Sister         lung     C.A.D. Son      Neurologic Disorder Son         M.S.      Cancer Mother         unknown type by patient, diagnosed on autopsy.        Social History:  Marital Status:   [5]  Social History     Tobacco Use     Smoking status: Never Smoker     Smokeless tobacco: Never Used   Substance Use Topics     Alcohol use: No     Drug use: No        Medications:      Acetaminophen (TYLENOL PO)   AmLODIPine Besylate (NORVASC PO)   ASPIRIN PO   Blood Glucose Monitoring Suppl MISC   Carvedilol (COREG PO)   Cranberry-Vitamin C-Inulin (UTI-STAT PO)   fluticasone (FLONASE) 50 MCG/ACT nasal spray   Furosemide (LASIX PO)   GABAPENTIN PO    Glycerin-Dimeth-Surfactants (ONE-STEP SKIN CARE LOTION) LOTN   hypromellose (ARTIFICIAL TEARS) 0.5 % SOLN   insulin aspart (NOVOLOG FLEXPEN) 100 UNIT/ML injection   insulin aspart (NOVOLOG FLEXPEN) 100 UNIT/ML injection   Insulin Glargine (BASAGLAR KWIKPEN SC)   LevETIRAcetam (KEPPRA PO)   LISINOPRIL PO   nitroglycerin (NITROSTAT) 0.4 MG SL tablet   nystatin (MYCOSTATIN) 412759 UNIT/GM POWD   polyethylene glycol (MIRALAX/GLYCOLAX) powder   QUEtiapine Fumarate (SEROQUEL PO)   QUEtiapine Fumarate (SEROQUEL PO)   sennosides (SENOKOT) 8.6 MG tablet   traZODone (DESYREL) 50 MG tablet   trolamine salicylate (ASPERCREME) 10 % cream   Venlafaxine HCl (EFFEXOR XR PO)         Review of Systems   All other systems reviewed and are negative.      Physical Exam   BP: (!) 121/94  Heart Rate: 90  Resp: 18  SpO2: (!) 78 %      Physical Exam   Constitutional: She is oriented to person, place, and time. She appears well-developed and well-nourished. No distress.   HENT:   Head: Normocephalic and atraumatic.   Mouth/Throat: No oropharyngeal exudate.   Cardiovascular: Normal rate, regular rhythm and normal heart sounds.   No murmur heard.  Pulmonary/Chest: Effort normal and breath sounds normal. No stridor. Tachypnea noted. No respiratory distress. She has no wheezes.   On BiPAP   Abdominal: Soft. Bowel sounds are normal. She exhibits no distension. There is no tenderness. There is no guarding.   Musculoskeletal: Normal range of motion.   Neurological: She is alert and oriented to person, place, and time.   Skin: She is not diaphoretic.   Nursing note and vitals reviewed.      ED Course        Procedures            Results for orders placed or performed during the hospital encounter of 04/02/19 (from the past 24 hour(s))   CBC with platelets differential   Result Value Ref Range    WBC 5.4 4.0 - 11.0 10e9/L    RBC Count 2.80 (L) 3.8 - 5.2 10e12/L    Hemoglobin 8.8 (L) 11.7 - 15.7 g/dL    Hematocrit 28.0 (L) 35.0 - 47.0 %      78 - 100 fl    MCH 31.4 26.5 - 33.0 pg    MCHC 31.4 (L) 31.5 - 36.5 g/dL    RDW 12.4 10.0 - 15.0 %    Platelet Count 103 (L) 150 - 450 10e9/L    Diff Method Automated Method     % Neutrophils 67.3 %    % Lymphocytes 21.4 %    % Monocytes 8.9 %    % Eosinophils 0.4 %    % Basophils 0.2 %    % Immature Granulocytes 1.8 %    Nucleated RBCs 0 0 /100    Absolute Neutrophil 3.7 1.6 - 8.3 10e9/L    Absolute Lymphocytes 1.2 0.8 - 5.3 10e9/L    Absolute Monocytes 0.5 0.0 - 1.3 10e9/L    Absolute Basophils 0.0 0.0 - 0.2 10e9/L    Abs Immature Granulocytes 0.1 0 - 0.4 10e9/L    Absolute Nucleated RBC 0.0    Comprehensive metabolic panel   Result Value Ref Range    Sodium 137 133 - 144 mmol/L    Potassium 4.3 3.4 - 5.3 mmol/L    Chloride 101 94 - 109 mmol/L    Carbon Dioxide 26 20 - 32 mmol/L    Anion Gap 10 3 - 14 mmol/L    Glucose 218 (H) 70 - 99 mg/dL    Urea Nitrogen 60 (H) 7 - 30 mg/dL    Creatinine 2.29 (H) 0.52 - 1.04 mg/dL    GFR Estimate 18 (L) >60 mL/min/[1.73_m2]    GFR Estimate If Black 21 (L) >60 mL/min/[1.73_m2]    Calcium 8.4 (L) 8.5 - 10.1 mg/dL    Bilirubin Total 0.4 0.2 - 1.3 mg/dL    Albumin 3.4 3.4 - 5.0 g/dL    Protein Total 8.2 6.8 - 8.8 g/dL    Alkaline Phosphatase 96 40 - 150 U/L    ALT 41 0 - 50 U/L    AST 39 0 - 45 U/L   Troponin I   Result Value Ref Range    Troponin I ES 0.112 (H) 0.000 - 0.045 ug/L   Blood gas venous   Result Value Ref Range    Ph Venous 7.32 7.32 - 7.43 pH    PCO2 Venous 58 (H) 40 - 50 mm Hg    PO2 Venous 30 25 - 47 mm Hg    Bicarbonate Venous 30 (H) 21 - 28 mmol/L    Base Excess Venous 2.5 mmol/L    FIO2 75    Nt probnp inpatient (BNP)   Result Value Ref Range    N-Terminal Pro BNP Inpatient 15,344 (H) 0 - 1,800 pg/mL   Lactic acid whole blood   Result Value Ref Range    Lactic Acid 1.4 0.7 - 2.0 mmol/L   XR Chest Port 1 View    Narrative    XR CHEST PORT 1 VW  4/2/2019 1:27 AM     INDICATION: Shortness of breath.    COMPARISON: 6/21/2015.      Impression    IMPRESSION: Shallow  inspiration. Mild perihilar interstitial opacities  could be edema or pneumonia. Sternotomy. Stable heart size near the  upper limits of normal.       Medications   ipratropium - albuterol 0.5 mg/2.5 mg/3 mL (DUONEB) neb solution 3 mL (not administered)   furosemide (LASIX) injection 40 mg (not administered)   azithromycin (ZITHROMAX) 500 mg in sodium chloride 0.9 % 250 mL intermittent infusion (not administered)     Labs are reviewed and the patient's BNP was significantly elevated, patient's renal function is also significantly worsened from the last time the patient was here.  Patient's troponin is slightly elevated but patient has no chest pain and EKG appears to be unchanged from one last month.  I think this is most likely a troponin leak from her heart failure and renal function.  Chest x-ray also shows a pretty significant right-sided consolidation and also signs of failure.   I discussed the case with our hospitalist and he will accept the patient.  Patient is relatively stable on BiPAP so we will plan on transferring to the ICU.  Patient was given 40 of Lasix here in the emergency department and patient had already received ceftriaxone today so I did start IV Zithromax which was not started in the nursing home.  I did update this done and patient is stable to be moved to the ICU.  I also verified with the son that he does not want any aggressive interventions done for his mom.    Assessments & Plan (with Medical Decision Making)  Congestive heart failure, pneumonia     I have reviewed the nursing notes.    I have reviewed the findings, diagnosis, plan and need for follow up with the patient.          Final diagnoses:   Acute on chronic congestive heart failure, unspecified heart failure type (H)   Pneumonia of right lung due to infectious organism, unspecified part of lung       4/2/2019   [facility] EMERGENCY DEPARTMENT     Luis M Gonzáles MD  04/02/19 0155

## 2019-04-02 NOTE — PLAN OF CARE
S-(situation): Occupational Therapy orders for evaluation and treatment.     B-(background): Per PT note: Patient is a 88 year old female, admitted with acute exacerbation of CHF and recent pneumonia diagnosis. Patient has a significant mental history as well as HTN, diabetes mellitus type 2, peripheral neuropathy, CKD stage 3, CADS/P CABG, COPD, and is legally blind. Patient is front a nursing home.    A-(assessment): Per rounding will hold occupational therapy orders as patient is in restraints and on bipap.    R-(recommendations): Will reassess needs tomorrow and advance as appropriate.    LEANNA Crowley/L  [facility]  [phone]

## 2019-04-02 NOTE — PLAN OF CARE
S-(situation): Physical Therapy orders acknowledged    B-(background): Patient is a 88 year old female, admitted with acute exacerbation of CHF and recent pneumonia diagnosis. Patient has a significant mental history as well as HTN, diabetes mellitus type 2, peripheral neuropathy, CKD stage 3, CADS/P CABG, COPD, and is legally blind. Patient is front a nursing home.     A-(assessment): Per rounding will hold physical therapy orders as patient is in restraints and on bipap.    R-(recommendations): Assess and treat as appropriate.    Thank you for your referral.    Nai Vernon, PT, DPT, ATC    [facility]    O: [phone]  E: [email or web address]

## 2019-04-02 NOTE — PROGRESS NOTES
S- (situation): Speech-Language orders received and appreciated.    B- (background): Patient admitted from nursing home on 4/02/19 with shortness of breath. Per chart review, patient was recently diagnosed with a pneumonia about 3 or 4 days ago.     A- (assessment): SLP attempted to see patient. Patient is currently on BiPap and not appropriate to be seen at this time.     R- (recommendations): Recommend NPO until bedside swallow evaluation. SLP will attempt to see patient in AM.    Alexandra Magallon MA, CF-SLP  Speech-Language Pathologist

## 2019-04-02 NOTE — PROVIDER NOTIFICATION
Temp at time of arrival was 103.8 ORAL recheck of RECTAL was 101.7.  MD made aware of both results, no new orders.

## 2019-04-02 NOTE — CONSULTS
CARE TRANSITION SOCIAL WORK INITIAL ASSESSMENT:      Met with: Patient and Family.    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       Resources List: Skilled Nursing Facility      Description of Support System: Involved, Supportive   Who is your support system?: Children   Support Assessment: Adequate family and caregiver support   Insurance Concerns: No Insurance issues identified     This writer met with pt and family introduced self and role. Discussed discharge planning and medicare guidelines in regards to home care and SNF benefits.  Clarified with family that patient is from [facility] (Main Phone: [phone] Admissions Phone: [phone] Fax: [phone]) LTC.  Patient has a MA bed hold.  Plan is for patient to return to [facility] when medically stable.  Will need van transport.  Patient has MA coverage for this.    PLAN    Return to [facility]    CADY Ross  [facility] [phone]/ Luz [phone]

## 2019-04-03 NOTE — DISCHARGE SUMMARY
[facility]    Death Summary - Hospitalist Service     Date of Admission:  2019  Date of Death: 4/3/2019  Discharging Provider: Toni Obrien MD    Hospital Course    Acute on chronic respiratory failure with hypoxia and hypercapnia (H)    Assessment: Ongoing, with patient having stabilization on BiPAP but no significant improvement in the 36 hours since admission.  Initially there was confusion regarding if this was secondary to new onset CHF versus pneumonia.  Echocardiogram has revealed normal ejection fraction and patient has not significantly improved with increased diuretic efforts.  She has developed a fever of 102.3 overnight with increased pro-calcitonin and increased concern for pneumonia given reasons as noted below.    Patient was changed over to Zosyn and Levaquin.  Later in the day the family are discussing among themselves asked to make their mother comfort care status.  Patient was taken off BiPAP and she  at 5:49 PM with her family present and seem to be in no distress.     Active Problems:    Pneumonia    Assessment: Diagnosed prior to admission with patient receiving IM Rocephin and PO azithromycin prior to admission (Rocephin started on 3/30 and azithromycin started on 3/31).  Was continued on these antibiotics at time of admission but at that time the azithromycin administration prior to admission was unknown.  Patient has been having fevers of 102.3, procalcitonin moderate risk range, ongoing Bipap needs and respiratory failure.  WBC is slightly low today.  Influenza testing was negative initially   Patient on the third day, after not improving, was switched to Zosyn and Levaquin.  As the day progressed she continued to require BiPAP support and by evening the family was requesting comfort care status.  She was changed over to comfort care status and  shortly thereafter       Acute exacerbation of CHF (congestive heart failure)      Assessment: suspected initially because of elevated BNP but echo is normal with preserved EF and patient has no other physical signs of overload.  Also had elevated procalcitonin and fever in the past 24 hours making infection much more likely etiology of respiratory failure.    After echocardiogram was performed, IV fluids were restarted and antibiotics were changed.  By evening she was making no improvement and she was changed to comfort care status.       Anemia    Assessment: present at baseline and related to chronic renal disease, however hemoglobin has decreased from baseline 9 range down to 7.5 this morning.  No active bleeding noted  RUBEN globin was monitored with no change         Acute kidney injury superimposed on CKD (H)    Assessment: creatinine elevated in the 2.2 range (baseline is 1.5-1.7) - is stable from time of admission however have been diuresing over the past 24 hours.    Threatening remained stable during her hospitalization.      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)    Assessment: normally on coreg, held secondary to NPO status and IV metoprolol 2.5 mg given instead with blood pressures well controlled         Dementia    Assessment: present at baseline, contributing to agitation and disorientation during this hospitalization          Hyperlipidemia LDL goal <100    Assessment: not on medications         COPD (chronic  obstructive pulmonary disease) (H)    Assessment: present at baseline but no wheezing or signs of exacerbation at this time         CAD s/p CABG 2012; angio 2008 - occluded RCA and RCA stent, distal circ occlusion with open prox-mid circ stent, 30% D2 stenosis    Assessment: previous history, on ASA and Coreg at baseline          Seizure disorder (H)    Assessment: on gabapentin and keppra at baseline.  Gabapentin has been held and keppra has been converted to IV dosing during this hospitalization so far.  No seizure activity has been noted.                Cause of death: Respiratory failure secondary to pneumonia       Toni Obrien MD  [facility]  ______________________________________________________________________      Significant Results and Procedures   Most Recent 3 CBC's:  Recent Labs   Lab Test 04/03/19  1354 04/03/19  0521 04/02/19  0530 04/02/19  0046   WBC  --  3.6* 5.4 5.4   HGB 7.8* 7.5* 8.5* 8.8*   MCV  --  102* 100 100   PLT  --  91* 96* 103*     Most Recent 3 BMP's:  Recent Labs   Lab Test 04/03/19  0521 04/02/19  0530 04/02/19  0046    139 137   POTASSIUM 4.3 4.7 4.3   CHLORIDE 106 103 101   CO2 27 26 26   BUN 60* 59* 60*   CR 2.25* 2.30* 2.29*   ANIONGAP 8 10 10   ELIZABETH 8.4* 8.3* 8.4*   * 239* 218*   ,   Results for orders placed or performed during the hospital encounter of 04/02/19   XR Chest Port 1 View    Narrative    XR CHEST PORT 1 VW  4/2/2019 1:27 AM     INDICATION: Shortness of breath.    COMPARISON: 6/21/2015.      Impression    IMPRESSION: Shallow inspiration. Mild perihilar interstitial opacities  could be edema or pneumonia. Sternotomy. Stable heart size near the  upper limits of normal.    EMMANUELLE RAMIREZ MD   XR Chest Port 1 View    Narrative    CHEST PORTABLE ONE VIEW   4/3/2019 1:38 PM     HISTORY: Pneumonia versus CHF in patient with ongoing BiPAP needs.  Re-evaluate lung appearance.    COMPARISON: 4/2/2019 chest x-ray.       Impression    IMPRESSION: Persistent bilateral infiltrates are mildly more prominent  than on the comparison study. Previous coronary artery bypass  grafting. Heart size is normal.           Consultations This Hospital Stay   PHYSICAL THERAPY ADULT IP CONSULT  OCCUPATIONAL THERAPY ADULT IP CONSULT  SOCIAL WORK IP CONSULT  SPEECH LANGUAGE PATH ADULT IP CONSULT  CARE TRANSITION RN/SW IP CONSULT    Primary Care Physician   Lisa Lopez    Time Spent on this Encounter   I, Toni Obrien, personally saw the patient today and spent less than or equal to 30 minutes discharging this patient.

## 2019-04-03 NOTE — PROGRESS NOTES
Met with the son and daughter and updated them in regards to the patient's status.  They reflected on the fact that their mom has wished for some time to die and that they were considering comfort care status.  Reviewed the patient's current status, reviewed what medications are being used, the fact that she is on BiPAP support and needing Precedex for sedation.  Somewhat would like to speak to another sister and he is leaning toward comfort care status.  Reviewed what that would entail, that we could use morphine for comfort  They will let us know shortly what their plans are\  Electronically signed by IRVING Obrien on April 3, 2019

## 2019-04-03 NOTE — PROGRESS NOTES
S-(situation): Note    B-(background): Pneumonia    A-(assessment): Please review system assessment and VS.  Note that pt has been on Bipap throughout the day.  With frequent oral care.  Pt remains in restraints and on a precedex drip as ordered.  Family here and is supportive.  Urine output has improved throughout the day.  Cares given.  Pt remains NPO.      R-(recommendations): Cont poc as ordered.

## 2019-04-03 NOTE — PROGRESS NOTES
Family requesting [facility] in [place], family currently waiting for other family members. Family Request that  home be called once all family has visited and left.

## 2019-04-03 NOTE — PLAN OF CARE
S-(situation): PT Scheduled    B-(background): Patient is a 88 year old female, admitted with acute exacerbation of CHF and recent pneumonia diagnosis. Patient has a significant mental history as well as HTN, diabetes mellitus type 2, peripheral neuropathy, CKD stage 3, CADS/P CABG, COPD, and is legally blind. Patient is front a nursing home.    A-(assessment): Per Rounding, Patient is not appropriate for physical therapy today.    R-(recommendations): Hold for today. Patient will be reassessed for tomorrow.

## 2019-04-03 NOTE — PROVIDER NOTIFICATION
Glucoses elevated > 150.   Updated MD regarding this and plan is to NOT start Insulin gtt per order.  Continue with sliding scale and Q4 hours checks

## 2019-04-03 NOTE — PROGRESS NOTES
Continues to be agitated, pulling at equipment. Staff concerned about pain, but earlier provider was worried about too much sedation with  meds  Being treated for CAP. Underlying dementia,, is non-verbal  While in ICU will try precedex infusion tonite to lightly sedate, re-evluate in AM  Electronically signed by IRVING Obrien on April 2, 2019

## 2019-04-03 NOTE — PROGRESS NOTES
Family after discussion amongst themselves have elected to move patient to comfort care status  Comfort care order set opened and orders placed.  Morphine/Roxanol ordered for discomfort  Potentially discharge back to nursing home tomorrow for comfort cares if she survives the night.  Electronically signed by IRVING Obrien on April 3, 2019

## 2019-04-03 NOTE — PROGRESS NOTES
Right wrist and Left wrist restraints continued 4/3/2019    Clinical Justification: Pulling lines, pulling tubes, and pulling equipment  Less Restrictive Alternative: Repositioning, Re-evaluate equipment, Alarm, Reorientation  Attending Physician Notified: Yes, Attending Physician's Name:  Dr. Hinton   New orders placed Yes  Length of Order: 1 Day      Sujey Hughes

## 2019-04-03 NOTE — PLAN OF CARE
Febrile today, tylenol given this AM.  See VS F/S. BG-240, 212, 181 today.  Tele- SR w/BBB and 1st degree AVB.  Remains on BIPAP all shift with FIO2- 60%.  LS coarse.  Plts-92, subcutaneous heparin discontinued and started SCD's for VTE.  IVF's infusing.  Prieto with sandee urine output.  Restrains in place, remains confused and pulls at tubes.  Rests well between cares, more restless at HS.  T&R every 2 hours. Lactic-0.9 today.

## 2019-04-03 NOTE — DEATH PRONOUNCEMENT
MD DEATH PRONOUNCEMENT    Called to pronounce Jayne Vasquez dead.    Physical Exam: Unresponsive to noxious stimuli, Spontaneous respirations absent, Breath sounds absent and Heart sounds absent    Patient was pronounced dead at 5:49 PM, April 3, 2019.    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 (examples: HIV, chicken pox, TB, Ebola, CJD) :  NO    Multi-drug resistant organism present? (example: MRSA): NO    Please consider an autopsy if any of the following exist:  NO Unexpected or unexplained death during or following any dental, medical, or surgical diagnostic treatment procedures.   NO Death of mother at or up to seven days after delivery.     NO All  and pediatric deaths.     NO Death where the cause is sufficiently obscure to delay completion of the death certificate.   NO Deaths in which autopsy would confirm a suspected illness/condition that would affect surviving family members or recipients of transplanted organs.     The following deaths must be reported to the 's Office:  NO A death that may be due entirely or in part to any factors other than natural disease (recent surgery, recent trauma, suspected abuse/neglect).   NO A death that may be an accident, suicide, or  homicide.     NO Any sudden, unexpected death in which there is no prior history of significant heart disease or any other condition associated with sudden death.   NO A death under suspicious, unusual, or unexpected circumstances.    NO Any death which is apparently due to natural causes but in which the  does not have a personal physician familiar with the patient s medical history, social, or environmental situation or the circumstances of the terminal event.   NO Any death apparently due to Sudden Infant Death Syndrome.     NO Deaths that occur during, in association with, or as consequences of a diagnostic, therapeutic, or anesthetic procedure.   NO Any death in which a fracture of a major bone has occurred within the past (6) six months.   NO A death of persons note seen by their physician within 120 days of demise.     NO Any death in which the  was an inmate of a public institution or was in the custody of Law Enforcement personnel.   NO  All unexpected deaths of children   NO Solid organ donors   NO Unidentified bodies   NO Deaths of persons whose bodies are to be cremated or otherwise disposed of so that the bodies will later be unavailable for examination;   NO Deaths unattended by a physician outside of a licensed healthcare facility or licensed residential hospice program   NO Deaths occurring within 24 hours of arrival to a health care facility if death is unexpected.    NO Deaths associated with the decedent s employment.   NO Deaths attributed to acts of terrorism.   NO   Any death in which there is uncertainty as to whether it is a medical examiner s care should be discussed with the medical investigator.        Body disposition: Autopsy was discussed with family member:  Family members in person.  Permission for autopsy was declined.  Organ donation was discussed with family member:  Family members.  Permission for organ donation was declined.  Body released to the   home.

## 2019-04-03 NOTE — PROGRESS NOTES
S-(situation): Attempted ST clinical bedside order    B-(background): Order for bedside clinical swallow evaluation received on 04/02/2019 and evaluation not completed due to patient being on BiPap and not appropriate for evaluation.      A-(assessment):SLP attempted to see patient again for evaluation.   Patient is currently on BiPap.  Nursing recommending that evaluation be placed on hold today due to BiPap and medications.  Nursing recommended that SLP attempt to complete evaluation tomorrow, as appropriate.       R-(recommendations): Recommend NPO until bedside clinical evaluation completed.  SLP will attempt to see patient again in AM.    Dunia Tomas MA, CCC/SLP  Speech Language Pathologist

## 2019-04-03 NOTE — PROGRESS NOTES
Initiation of Restraints    S- restraints    B- pneumonia, CHF, BIPAP    A- Pt is pulling at tubes, BIPAP and IV and ovalle.  Attempted to reorient pt and distract pt.  Nursing assistant sat with pt for awhile as well.  Pt cont to pull at BIPAP mask.      R- Dr. Houston notified and charge RN notified.        Type of restraints applied- soft wrist restraints  Where restraints were applied- Bilateral wrists  Provider Notified (name)- Dr. Houston  Order received within 1 hour of initiation- yes  All flowsheet rows filled out including less restrictive alternatives- yes  Care Plan initiated- yes  Education initiated and documented  yes

## 2019-04-03 NOTE — PLAN OF CARE
Patient is oriented to self only.   She moves all extremities equally although not always to command.She has tolerated Precedex gtt over night RASS -1 to -2, titration down started at 0545.   This has greatly helped her tolerate BiPAP and rest between cares.  Small increase in FiO2 over night for low saturations.    LS are diminished and coarse, congested non productive cough.  SR with 1 st degree AVB/BBB on telemetry, no ectopy noted.   Fever is down, Tylenol rectally administered twice, none since 0000.  Influenza swab negative.  Oral care completed per protocol.  She remains retrained for safety, skin is intact. ROM performed per protocol.  UOP trending down over night, MD made aware and orders placed.  Glucoses remain elevated sliding scale insulin used per MAR.  Weight is down 1.3 kg from admission.

## 2019-04-03 NOTE — PROGRESS NOTES
Pt ceased respirations/palpable carotid pulse/response to stimuli at 1749. Micah GOTTLIEB was notified. Family at bedside.

## 2019-04-03 NOTE — PROGRESS NOTES
Pt placed on comfort cares. BiPap dc'd as well as other lines, and restraints dc'd.  family at bedside

## 2019-04-03 NOTE — PROGRESS NOTES
[facility]    Medicine Progress Note - Hospitalist Service       Date of Admission:  4/2/2019  Assessment & Plan   Principal Problem:    Acute on chronic respiratory failure with hypoxia and hypercapnia (H)    Assessment: Ongoing, with patient having stabilization on BiPAP but no significant improvement in the 36 hours since admission.  Initially there was confusion regarding if this was secondary to new onset CHF versus pneumonia.  Echocardiogram has revealed normal ejection fraction and patient has not significantly improved with increased diuretic efforts.  She has developed a fever of 102.3 overnight with increased pro-calcitonin and increased concern for pneumonia given reasons as noted below.      Plan: We will continue with BiPAP for now, stop IV Lasix administration and increase IV fluids to 75 cc an hour as patient is unable to eat or drink given ongoing BiPAP use.  Will transition patient to Zosyn and Levaquin for antibiotic and monitor respiratory status closely.  We will recheck ABG tomorrow morning and consider trial off BiPAP if she appears to be clinically improving.    Active Problems:    Pneumonia    Assessment: Diagnosed prior to admission with patient receiving IM Rocephin and PO azithromycin prior to admission (Rocephin started on 3/30 and azithromycin started on 3/31).  Was continued on these antibiotics at time of admission but at that time the azithromycin administration prior to admission was unknown.  Patient has been having fevers of 102.3, procalcitonin moderate risk range, ongoing Bipap needs and respiratory failure.  WBC is slightly low today.  Influenza testing was negative initially     Plan: Given new information and patient was already on Rocephin and azithromycin for approximately 3 days prior to hospitalization and with patient's ongoing clinical lack of improvement over the past 36 hours since admission will transition patient to Zosyn and Levaquin  for healthcare associated pneumonia risk as patient is a long-term nursing home resident.  We will also send viral respiratory panel to further evaluate if there is an underlying viral etiology as well.  Continue with BiPAP as above and recheck pro-calcitonin later this evening.  Monitor patient closely in the ICU setting.        Acute exacerbation of CHF (congestive heart failure)     Assessment: suspected initially because of elevated BNP but echo is normal with preserved EF and patient has no other physical signs of overload.  Also had elevated procalcitonin and fever in the past 24 hours making infection much more likely etiology of respiratory failure.      Plan: Will discontinue Lasix, continue with IV fluids at 75 ml/hr for now and consider increase in fluids if sepsis develops or patient is not able to wean off Bipap today.  Monitor volume status closely       Anemia    Assessment: present at baseline and related to chronic renal disease, however hemoglobin has decreased from baseline 9 range down to 7.5 this morning.  No active bleeding noted    Plan: Will discontinue Lasix, monitor hemoglobin every 8 hours.        Acute kidney injury superimposed on CKD (H)    Assessment: creatinine elevated in the 2.2 range (baseline is 1.5-1.7) - is stable from time of admission however have been diuresing over the past 24 hours.      Plan: Will discontinue diuresis efforts as above, recheck creatinine this evening and tomorrow morning, monitor urine output closely       Elevated troponin    Assessment: Felt likely secondary to demand ischemia given normal echocardiogram and stability of troponin as patient's respiratory status continues to be stable with ongoing respiratory failure as above    Plan:   We will continue to monitor troponins to ensure improvement as patient's respiratory status starts to improve.      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  Currently is ongoing on sliding scale insulin secondary to ongoing Bipap use and NPO.  Blood sugars have been in the 187-229 range.    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)    Assessment: normally on coreg, held secondary to NPO status and IV metoprolol 2.5 mg given instead with blood pressures well controlled    Plan: continue with metoprolol IV for now and monitor      Dementia    Assessment: present at baseline, contributing to agitation and disorientation during this hospitalization     Plan: continue supportive cares      Hyperlipidemia LDL goal <100    Assessment: not on medications    Plan: no acute intervention needed      COPD (chronic obstructive pulmonary disease) (H)    Assessment: present at baseline but no wheezing or signs of exacerbation at this time    Plan: Will continue with prn nebs, no steroids at this time.       CAD s/p CABG 2012; angio 2008 - occluded RCA and RCA stent, distal circ occlusion with open prox-mid circ stent, 30% D2 stenosis    Assessment: previous history, on ASA and Coreg at baseline     Plan: continue to give ASA rectally and metoprolol IV while patient is NPO       Seizure disorder (H)    Assessment: on gabapentin and keppra at baseline.  Gabapentin has been held and keppra has been converted to IV dosing during this hospitalization so far.  No seizure activity has been noted.      Plan: continue with IV Keppra and monitor closely for any seizure like activity       Diet: NPO for Medical/Clinical Reasons Except for: Meds    DVT Prophylaxis: Pneumatic Compression Devices  Prieto Catheter: in place, indication: Strict 1-2 Hour I&O  Code Status: DNR/DNI      Disposition Plan   Expected discharge: 3-6 days, recommended to prior  living arrangement once adequate pain management/ tolerating PO medications, antibiotic plan established, renal function improved, safe disposition plan/ TCU bed available and respiratory status improves.  Entered: Coleen Hinton MD 04/03/2019, 12:59 PM       The patient's care was discussed with the Bedside Nurse, Patient and Patient's Family.    Coleen Hinton MD  Hospitalist Service  [facility]    ______________________________________________________________________    Interval History   Patient had fever of 102.3 overnight, ongoing need for Bipap support today although blood gas this afternoon is holding stable.  Patient is much more comfortable and less agitated with Precedex drip started overnight.  Had decreased urine output overnight, improved following Lasix administration but now decreasing again this afternoon.  Echo was negative for systolic dysfunction.  No other new concerns at this time.      Data reviewed today: I reviewed all medications, new labs and imaging results over the last 24 hours.    Physical Exam   Vital Signs: Temp: 98.2  F (36.8  C) Temp src: Axillary BP: 119/62 Pulse: 79 Heart Rate: 80 Resp: 20 SpO2: 93 % O2 Device: BiPAP/CPAP Oxygen Delivery: 10 LPM  Weight: 207 lbs 3.72 oz  Constitutional: awake, alert, oriented only to name, no acute distress on Bipap mask  Respiratory: No increased work of breathing, decreased air exchange, mild crackles in bilateral bases, no wheezing  Cardiovascular: Normal apical impulse, regular rate and rhythm  GI: bowel sounds present, abdomen soft and non-tender  Skin: normal skin color, texture, turgor  Musculoskeletal: no lower extremity pitting edema present  Neurologic: awake and alert, oriented only to self, calm and not currently having any distress regarding her situation.  Able to move extremities small distances (as much as arm restraints will allow).      Data   Recent Labs   Lab  04/03/19  0521 04/02/19  1835 04/02/19  1157 04/02/19  0530 04/02/19  0046   WBC 3.6*  --   --  5.4 5.4   HGB 7.5*  --   --  8.5* 8.8*   *  --   --  100 100   PLT 91*  --   --  96* 103*     --   --  139 137   POTASSIUM 4.3  --   --  4.7 4.3   CHLORIDE 106  --   --  103 101   CO2 27  --   --  26 26   BUN 60*  --   --  59* 60*   CR 2.25*  --   --  2.30* 2.29*   ANIONGAP 8  --   --  10 10   ELIZABETH 8.4*  --   --  8.3* 8.4*   *  --   --  239* 218*   ALBUMIN  --   --   --   --  3.4   PROTTOTAL  --   --   --   --  8.2   BILITOTAL  --   --   --   --  0.4   ALKPHOS  --   --   --   --  96   ALT  --   --   --   --  41   AST  --   --   --   --  39   TROPI 0.128* 0.129* 0.130* 0.103* 0.112*

## 2019-04-03 NOTE — PLAN OF CARE
Problem: Patient Care Overview  Goal: Plan of Care/Patient Progress Review  S-(situation): OT evaluation    B-(background): Pt is an 88 year old female admitted from LTC with CHF exacerbation and pneumonia. Pt scheduled for OT evaluation this AM    A-(assessment): Per rounding team, pt is not appropriate for OT evaluation or intervention this date.     R-(recommendations): Defer OT evaluation today and will reassess tomorrow or as appropriate.     Alexandra Hankins, OTR/L  [facility]  [phone]

## 2019-04-03 NOTE — PROGRESS NOTES
Note that pt's son talk to writer and said he need to talk to some about comfort measures.  Pt's son stated that pt is so very unhappy and wants to die.  Note left for house officer.

## 2019-04-03 NOTE — PROVIDER NOTIFICATION
Discussed with MD patient's current orders for Tv while on BiPAP.  He stated minimum was 350 ml based of her ideal weight, not actual.  Order was changed to reflect this.  Tv is greater than 350.

## 2019-04-03 NOTE — PROVIDER NOTIFICATION
DATE: 4/3/2019    TIME OF RECEIPT FROM LAB:  1338  LAB TEST:  TROPONIN  LAB VALUE:  Troponin-0.128  RESULTS GIVEN WITH READ-BACK TO (PROVIDER):  Dr. Hinton, Charge RN updated.  TIME LAB VALUE REPORTED TO PROVIDER:   1340  NEW ORDERS: No

## 2019-04-08 LAB
BACTERIA SPEC CULT: NO GROWTH
BACTERIA SPEC CULT: NO GROWTH
Lab: NORMAL
Lab: NORMAL
SPECIMEN SOURCE: NORMAL
SPECIMEN SOURCE: NORMAL